# Patient Record
Sex: FEMALE | Race: WHITE | NOT HISPANIC OR LATINO | Employment: PART TIME | URBAN - NONMETROPOLITAN AREA
[De-identification: names, ages, dates, MRNs, and addresses within clinical notes are randomized per-mention and may not be internally consistent; named-entity substitution may affect disease eponyms.]

---

## 2017-07-18 ENCOUNTER — COMMUNICATION - GICH (OUTPATIENT)
Dept: FAMILY MEDICINE | Facility: OTHER | Age: 32
End: 2017-07-18

## 2017-12-10 ENCOUNTER — AMBULATORY - GICH (OUTPATIENT)
Dept: FAMILY MEDICINE | Facility: OTHER | Age: 32
End: 2017-12-10

## 2017-12-10 DIAGNOSIS — J02.0 STREPTOCOCCAL PHARYNGITIS: ICD-10-CM

## 2018-02-19 ENCOUNTER — DOCUMENTATION ONLY (OUTPATIENT)
Dept: FAMILY MEDICINE | Facility: OTHER | Age: 33
End: 2018-02-19

## 2018-08-20 ENCOUNTER — NURSE TRIAGE (OUTPATIENT)
Dept: FAMILY MEDICINE | Facility: OTHER | Age: 33
End: 2018-08-20

## 2018-08-20 NOTE — TELEPHONE ENCOUNTER
" Patient calls and states the following:  I am traveling up the [facility] and think I might have a UTI. Reports frequency and burning with urination. Informed patient that PCP is not in clinic and recommended she go to a clinic near where she is traveling for an evaluation of urinary symptoms, \" I will. Thank you so much\". Maday Mackenzie RN on 8/20/2018 at 12:22 PM      Answer Assessment - Initial Assessment Questions  1. SYMPTOM: \"What's the main symptom you're concerned about?\" (e.g., frequency, incontinence)      *No Answer*  2. ONSET: \"When did the  ________  start?\"      *No Answer*  3. PAIN: \"Is there any pain?\" If so, ask: \"How bad is it?\" (Scale: 1-10; mild, moderate, severe)      *No Answer*  4. CAUSE: \"What do you think is causing the symptoms?\"      *No Answer*  5. OTHER SYMPTOMS: \"Do you have any other symptoms?\" (e.g., fever, flank pain, blood in urine, pain with urination)      *No Answer*  6. PREGNANCY: \"Is there any chance you are pregnant?\" \"When was your last menstrual period?\"      *No Answer*    Protocols used: URINARY SYMPTOMS-ADULT-    "

## 2018-12-07 ENCOUNTER — TELEPHONE (OUTPATIENT)
Dept: FAMILY MEDICINE | Facility: OTHER | Age: 33
End: 2018-12-07

## 2018-12-07 ENCOUNTER — HOSPITAL ENCOUNTER (OUTPATIENT)
Dept: GENERAL RADIOLOGY | Facility: OTHER | Age: 33
Discharge: HOME | End: 2018-12-07
Attending: FAMILY MEDICINE | Admitting: FAMILY MEDICINE
Payer: COMMERCIAL

## 2018-12-07 ENCOUNTER — OFFICE VISIT (OUTPATIENT)
Dept: FAMILY MEDICINE | Facility: OTHER | Age: 33
End: 2018-12-07
Attending: FAMILY MEDICINE
Payer: COMMERCIAL

## 2018-12-07 VITALS
TEMPERATURE: 98.1 F | RESPIRATION RATE: 16 BRPM | HEIGHT: 66 IN | BODY MASS INDEX: 25.23 KG/M2 | DIASTOLIC BLOOD PRESSURE: 70 MMHG | SYSTOLIC BLOOD PRESSURE: 100 MMHG | WEIGHT: 157 LBS

## 2018-12-07 DIAGNOSIS — T83.32XA INTRAUTERINE CONTRACEPTIVE DEVICE THREADS LOST, INITIAL ENCOUNTER: ICD-10-CM

## 2018-12-07 DIAGNOSIS — Z00.00 ROUTINE HISTORY AND PHYSICAL EXAMINATION OF ADULT: Primary | ICD-10-CM

## 2018-12-07 PROCEDURE — 99395 PREV VISIT EST AGE 18-39: CPT | Performed by: FAMILY MEDICINE

## 2018-12-07 PROCEDURE — 74019 RADEX ABDOMEN 2 VIEWS: CPT

## 2018-12-07 ASSESSMENT — PAIN SCALES - GENERAL: PAINLEVEL: NO PAIN (0)

## 2018-12-07 NOTE — MR AVS SNAPSHOT
"              After Visit Summary   12/7/2018    Pat Mccabe    MRN: [id number]           Patient Information     Date Of Birth          1985        Visit Information        Provider Department      12/7/2018 10:30 AM Renetta Fletcher DO [facility] [place]        Today's Diagnoses     Routine history and physical examination of adult    -  1    Intrauterine contraceptive device threads lost, initial encounter           Follow-ups after your visit        Future tests that were ordered for you today     Open Future Orders        Priority Expected Expires Ordered    XR Abdomen 2 Views Routine 12/7/2018 12/7/2019 12/7/2018            Who to contact     If you have questions or need follow up information about today's clinic visit or your schedule please contact [facility] directly at [phone].  Normal or non-critical lab and imaging results will be communicated to you by [facility], letter or phone within 4 business days after the clinic has received the results. If you do not hear from us within 7 days, please contact the clinic through [facility] or phone. If you have a critical or abnormal lab result, we will notify you by phone as soon as possible.  Submit refill requests through [facility] or call your pharmacy and they will forward the refill request to us. Please allow 3 business days for your refill to be completed.          Additional Information About Your Visit        [facility] Information     [facility] lets you send messages to your doctor, view your test results, renew your prescriptions, schedule appointments and more. To sign up, go to [email or web address] . Click on \"Log in\" on the left side of the screen, which will take you to the Welcome page. Then click on \"Sign up Now\" on the right side of the page.     You will be asked to enter the access code listed below, as well as some personal information. Please follow the directions to create your username and " "password.     Your access code is: [email or web address]  Expires: 3/7/2019 10:27 AM     Your access code will  in 90 days. If you need help or a new code, please call your [facility] or [phone].        Care EveryWhere ID     This is your Care EveryWhere ID. This could be used by other organizations to access your [facility] medical records  [id number]        Your Vitals Were     Temperature Respirations Height Breastfeeding? BMI (Body Mass Index)       98.1  F (36.7  C) (Tympanic) 16 5' 6.25\" (1.683 m) No 25.15 kg/m2        Blood Pressure from Last 3 Encounters:   18 100/70   16 102/78   12/15/15 108/62    Weight from Last 3 Encounters:   18 157 lb (71.2 kg)   16 169 lb 9.6 oz (76.9 kg)   12/15/15 167 lb 9.6 oz (76 kg)               Primary Care Provider Office Phone # Fax #    [facility] [phone] [address]  [facility] [id number]        Equal Access to Services     ANNMARIE STOREY AH: Hadii nasreen virgeno Soroge, waaxda luqadaha, qaybta kaalmada adeegyada, glen dooley. So [facility] [phone].    ATENCIÓN: Si habla español, tiene a simmons disposición servicios gratuitos de asistencia lingüística. [facility] [phone].    We comply with applicable federal civil rights laws and [place] laws. We do not discriminate on the basis of race, color, national origin, age, disability, sex, sexual orientation, or gender identity.            Thank you!     Thank you for choosing [facility]  for your care. Our goal is always to provide you with excellent care. Hearing back from our patients is one way we can continue to improve our services. Please take a few minutes to complete the written survey that you may receive in the mail after your visit with us. Thank you!             Your Updated Medication List - Protect others around you: Learn how to safely use, store and throw away your medicines at " www.disposemymeds.org.      Notice  As of 12/7/2018 11:59 PM    You have not been prescribed any medications.

## 2018-12-07 NOTE — PROGRESS NOTES
"Nursing Notes:   Patience Medina LPN  12/7/2018 10:46 AM  Signed  Chief Complaint   Patient presents with     Physical     yearly physical, pap, and IUD       Initial /70 (BP Location: Right arm, Patient Position: Sitting, Cuff Size: Adult Regular)  Temp 98.1  F (36.7  C) (Tympanic)  Resp 16  Ht 5' 6.25\" (1.683 m)  Wt 157 lb (71.2 kg)  Breastfeeding? No  BMI 25.15 kg/m2 Estimated body mass index is 25.15 kg/(m^2) as calculated from the following:    Height as of this encounter: 5' 6.25\" (1.683 m).    Weight as of this encounter: 157 lb (71.2 kg).  Medication Reconciliation: complete    Patience Medina LPN     ANNUAL PHYSICAL - FEMALE    HPI: Patient who presents for a yearly exam.  Concerns include:  1. IUD check.  No pain.  No periods.    No LMP recorded. Patient is not currently having periods (Reason: IUD).   Contraception: Mirena IUD  Risk for STI?: No  Last pap: 1/22/2016 - negative with Neg HPV.  Due 1/22/2021  Any hx of abnormal paps:  No  FH ofearly CA?: No  Cholesterol/DM concerns/screening: No  Tobacco?: No  Calcium intake: No  DEXA: NA  Last mammo: NA  Colonoscopy: NA  Immunizations: Tdap 8/5/2015    Patient Active Problem List   Diagnosis     Spontaneous vaginal delivery       Past Medical History:   Diagnosis Date     Syncope and collapse     2011,with abd pain, (-) workup       Past Surgical History:   Procedure Laterality Date     COLONOSCOPY      2011     LAPAROSCOPIC CHOLECYSTECTOMY      1/2/14,Cholecystectomy,Laparoscopic       Social History     Social History     Marital status:      Spouse name: N/A     Number of children: N/A     Years of education: N/A     Social History Main Topics     Smoking status: Former Smoker     Packs/day: 0.20     Types: Cigarettes     Quit date: 11/12/2013     Smokeless tobacco: Never Used     Alcohol use No     Drug use: None      Comment: Drug use: No     Sexual activity: Yes     Partners: Male     Other Topics Concern     None     Social History Narrative " "   Patient lives in [place].     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    Boyfriend, Stephan, works at [employer].    .       History reviewed. No pertinent family history.    No current outpatient prescriptions on file.        REVIEW OF SYSTEMS:  Refer to HPI; all other systems reviewed and negative.    PHYSICAL EXAM:  /70 (BP Location: Right arm, Patient Position: Sitting, Cuff Size: Adult Regular)  Temp 98.1  F (36.7  C) (Tympanic)  Resp 16  Ht 5' 6.25\" (1.683 m)  Wt 157 lb (71.2 kg)  Breastfeeding? No  BMI 25.15 kg/m2  CONSTITUTIONAL:  Alert, cooperative, NAD.  EYES: No scleral icterus.  PERRLA.  Conjunctiva clear.  ENT/MOUTH: External ears and nose normal.  TMs normal.  Moist mucous membranes. Oropharynx clear.    ENDO: No thyromegaly or thyroid nodules.  LYMPH:  No cervical or supraclavicular LA.    BREASTS: No skin abnormalities, no erythema.  No discrete masses.  No nipple discharge, no axillary, supra- or infraclavicular LA.   CARDIOVASCULAR: Regular, S1, S2.  No S3 or S4.  No murmur/gallop/rub.  No peripheral edema.  RESPIRATORY: CTA bilaterally, no wheezes, rhonchi or rales.  GI: Bowel sounds wnl.  Soft, nontender, non-distended.  No masses or HSM.  No rebound or guarding.  : Vulva: normal, no lesions or discharge  Urethral meatus: normal size and location, no lesions or discharge  Urethra: no tenderness or masses  Bladder: no fullness or tenderness  Vagina: normal appearance, no abnormal discharge, no lesions.  No evidence of cystocele or rectocele.  Cervix: normal appearance, no lesions, no abnormal discharge.  No IUD strings visualized.  Uterus: normal size and position, mobile, non-tender  Adnexa: nopalpable masses bilaterally. No cervical motion tenderness.  Pap smear obtained: No  MSKEL: Grossly normal ROM.  No clubbing.  INTEGUMENTARY:Warm, dry.  No rash noted on exposed skin.  NEUROLOGIC: Facies symmetric.  Grossly normal movement and tone.  No " tremor.  PSYCHIATRIC: Affect normal.  Speech fluent.      PHQ-2 Score:   PHQ-2 ( 1999 Pf[employer]) 12/7/2018   Q1: Little interest or pleasure in doing things 0   Q2: Feeling down, depressed or hopeless 0   PHQ-2 Score 0      Abd XR:  IUD in place, just L of center and sligthly tilted.  Consider US if concerns.    ASSESSMENT/PLAN:  1. Routine history and physical examination of adult    2. Intrauterine contraceptive device threads lost, initial encounter  IUD strings lost; but IUD remains in place and asymptomatic.  Leave in place, and only evaluate further at time of removal vs if becoming symptomatic.  - XR Abdomen 2 Views; Future    Follow up yearly; sooner prn.    Relevant cancer screening discussed.    Counseled on healthy diet, Calcium and vitamin D intake, and exercise.    Renetta Fletcher

## 2018-12-07 NOTE — NURSING NOTE
"Chief Complaint   Patient presents with     Physical     yearly physical, pap, and IUD       Initial /70 (BP Location: Right arm, Patient Position: Sitting, Cuff Size: Adult Regular)  Temp 98.1  F (36.7  C) (Tympanic)  Resp 16  Ht 5' 6.25\" (1.683 m)  Wt 157 lb (71.2 kg)  Breastfeeding? No  BMI 25.15 kg/m2 Estimated body mass index is 25.15 kg/(m^2) as calculated from the following:    Height as of this encounter: 5' 6.25\" (1.683 m).    Weight as of this encounter: 157 lb (71.2 kg).  Medication Reconciliation: complete    Patience Medina LPN   "

## 2019-01-09 ENCOUNTER — MYC MEDICAL ADVICE (OUTPATIENT)
Dept: FAMILY MEDICINE | Facility: OTHER | Age: 34
End: 2019-01-09

## 2019-03-26 ENCOUNTER — MYC MEDICAL ADVICE (OUTPATIENT)
Dept: FAMILY MEDICINE | Facility: OTHER | Age: 34
End: 2019-03-26

## 2019-03-26 DIAGNOSIS — J35.8 TONSIL STONE: ICD-10-CM

## 2019-03-26 DIAGNOSIS — D22.9 MULTIPLE ATYPICAL SKIN MOLES: Primary | ICD-10-CM

## 2019-04-19 ENCOUNTER — HEALTH MAINTENANCE LETTER (OUTPATIENT)
Age: 34
End: 2019-04-19

## 2019-07-02 ENCOUNTER — MYC MEDICAL ADVICE (OUTPATIENT)
Dept: FAMILY MEDICINE | Facility: OTHER | Age: 34
End: 2019-07-02

## 2019-09-16 ENCOUNTER — OFFICE VISIT (OUTPATIENT)
Dept: FAMILY MEDICINE | Facility: OTHER | Age: 34
End: 2019-09-16
Attending: PHYSICIAN ASSISTANT
Payer: COMMERCIAL

## 2019-09-16 VITALS
BODY MASS INDEX: 25.55 KG/M2 | HEART RATE: 72 BPM | SYSTOLIC BLOOD PRESSURE: 100 MMHG | WEIGHT: 159 LBS | TEMPERATURE: 97.7 F | HEIGHT: 66 IN | OXYGEN SATURATION: 97 % | DIASTOLIC BLOOD PRESSURE: 78 MMHG | RESPIRATION RATE: 18 BRPM

## 2019-09-16 DIAGNOSIS — J35.8 TONSIL STONE: ICD-10-CM

## 2019-09-16 DIAGNOSIS — Z01.818 PREOP GENERAL PHYSICAL EXAM: Primary | ICD-10-CM

## 2019-09-16 LAB
HCG UR QL: NEGATIVE
HGB BLD-MCNC: 14.5 G/DL (ref 11.7–15.7)

## 2019-09-16 PROCEDURE — 85018 HEMOGLOBIN: CPT | Mod: ZL | Performed by: PHYSICIAN ASSISTANT

## 2019-09-16 PROCEDURE — 81025 URINE PREGNANCY TEST: CPT | Mod: ZL | Performed by: PHYSICIAN ASSISTANT

## 2019-09-16 PROCEDURE — 99214 OFFICE O/P EST MOD 30 MIN: CPT | Performed by: PHYSICIAN ASSISTANT

## 2019-09-16 PROCEDURE — 36415 COLL VENOUS BLD VENIPUNCTURE: CPT | Mod: ZL | Performed by: PHYSICIAN ASSISTANT

## 2019-09-16 ASSESSMENT — MIFFLIN-ST. JEOR: SCORE: 1442.97

## 2019-09-16 NOTE — NURSING NOTE
"Date of Surgery: 9/26/19  Type of Surgery: Tonsillectomy  Surgeon: Dr. Cortez  Hospital:  [facility]  Fax:     Fever/Chills or other infectious symptoms in past month: NO  >10lb weight loss in past two months: No  O2 SAT: 97    Health Care Directive/Code status:  FULL  Hx of blood transfusions:   NO   Td up to date:  2015  History of VRE/MRSA:  NO Date:    Preoperative Evaluation: Obstructive Sleep Apnea screening    S: Snore -  Do you snore loudly? (louder than talking or loud enough to be heard through closed doors)NO  T: Tired - Do you often feel tired, fatigued, or sleepy during the daytime?NO  O: Observed - Has anyone ever observed you stop breathing during your sleep?No  P: Pressure - Do you have or are you being treated for high blood pressure?NO  B: BMI - BMI greater than 35kg/m2?NO  A: Age - Age over 50 years old?NO  N: Neck - Neck circumference greater than 40 cm?NO  G: Gender - Gender: Male?NO    Total number of \"YES\" responses:  0    Scoring: Low risk of DANNY 0-2  At Risk of DANNY: >3 High Risk of DANNY: 5-8    Milady Horta LPN ...... 9/16/2019 10:42 AM    Chief Complaint   Patient presents with     Pre-Op Exam         Medication Reconciliation: complete    Verónica Horta LPN        "

## 2019-09-16 NOTE — PROGRESS NOTES
----------------- PREOPERATIVE EXAM ------------------  9/16/2019    SUBJECTIVE:  Pat Mccabe is a 33 year old female here for preoperative optimization.    I was asked to see Pat Mccabe by Dr. Cortez for a preoperative optimization due to general health and anesthesia risk.     Patient has a history of recurrent tonsil stones. Has had them her whole life. Also notes she gets a bad taste in her mouth and sore throats often. Today she is concerned about using pain medication after surgery. Has been on pain medication three times and got very nauseous each time. One time she started throwing up after she got home. Wants to try and avoid this with her upcoming surgery. No cough/cold symptoms.     Patient has otherwise tolerated surgery fine in the past.  Former smoker but not currently.  No fevers, chills, sore throat, ear pain, chest pain, palpitations, problems breathing, stomachaches, nausea, vomiting, diarrhea, constipation, blood in stool or urine, dysuria, frequency, urgency.  No swelling of her hands or feet.    Nursing Notes:   Verónica Horta LPN  9/16/2019 10:52 AM  Signed  Date of Surgery: 9/26/19  Type of Surgery: Tonsillectomy  Surgeon: Dr. Cortez  Hospital:  [facility]  Fax:     Fever/Chills or other infectious symptoms in past month: NO  >10lb weight loss in past two months: No  O2 SAT: 97    Health Care Directive/Code status:  FULL  Hx of blood transfusions:   NO   Td up to date:  2015  History of VRE/MRSA:  NO Date:    Preoperative Evaluation: Obstructive Sleep Apnea screening    S: Snore -  Do you snore loudly? (louder than talking or loud enough to be heard through closed doors)NO  T: Tired - Do you often feel tired, fatigued, or sleepy during the daytime?NO  O: Observed - Has anyone ever observed you stop breathing during your sleep?No  P: Pressure - Do you have or are you being treated for high blood pressure?NO  B: BMI - BMI greater than 35kg/m2?NO  A: Age - Age  "over 50 years old?NO  N: Neck - Neck circumference greater than 40 cm?NO  G: Gender - Gender: Male?NO    Total number of \"YES\" responses:  0    Scoring: Low risk of DANNY 0-2  At Risk of DANNY: >3 High Risk of DANNY: 5-8    Milady Horta LPN ...... 2019 10:42 AM    Chief Complaint   Patient presents with     Pre-Op Exam         Medication Reconciliation: complete    Verónica Horta LPN          There are no active problems to display for this patient.      Past Medical History:   Diagnosis Date     Syncope and collapse     ,with abd pain, (-) workup       Past Surgical History:   Procedure Laterality Date     COLONOSCOPY           LAPAROSCOPIC CHOLECYSTECTOMY      14,Cholecystectomy,Laparoscopic       No current outpatient medications on file.       Recent use of ibuprofen, aspirin or aleve: No     Allergies:  Allergies   Allergen Reactions     Amoxicillin-Pot Clavulanate GI Disturbance     Stomach cramps and diarrhea       Latex allergy  No    Family History   Problem Relation Age of Onset     No Known Problems Mother      No Known Problems Father      No Known Problems Sister      No Known Problems Brother        Denies family hx of bleeding tendencies, anesthesia complications, or other problems with surgery.  None    Personal history of nausea with pain medications in the past.     Social History     Tobacco Use     Smoking status: Former Smoker     Packs/day: 0.20     Types: Cigarettes     Last attempt to quit: 2013     Years since quittin.8     Smokeless tobacco: Never Used     Tobacco comment: Quit 3 years    Substance Use Topics     Alcohol use: Yes     Alcohol/week: 0.0 oz     Comment: occasional, 3 per week          ROS:    surgical:  patient denies previous complications from prior surgeries including but not limited to prolonged bleeding, anesthesia complications, dysrhythmias, surgical wound infections, or prolonged hospital stay.      REVIEW OF SYSTEMS:  A comprehensive " "review of systems was negative except for items noted in HPI/Subjective.    Constitutional: Negative for fever, chills and fatigue .   Eyes: Negative for irritation  and redness .  Ears, nose, mouth, throat, and face: Negative for ear drainage, nasal congestion, sore throat and hoarseness .  See HPI.  Respiratory: Negative for cough, sputum production , hemoptysis, dyspnea  and wheezing .  Cardiovascular: Negative for chest discomfort, palpitations and lightheadedness .  Gastrointestinal: Negative for dysphagia, nausea, vomiting, melena, diarrhea, constipation and abdominal pain.  Genitourinary: Negative for frequency, dysuria, urinary incontinence, hematuria.  Integument/breast: Negative for rash.   Hematologic/lymphatic: Negative for easy bruising, bleeding, lymphadenopathy and petechiae.   Musculoskeletal: Negative for myalgias and arthralgias .  Neurological: Negative for headaches, dizziness, vertigo, seizures and weakness.   Psychiatric: Negative for anxiety, depression, panic attacks and suicidal ideations.       -------------------------------------------------------------    PHYSICAL EXAM:  /78 (BP Location: Right arm, Patient Position: Sitting, Cuff Size: Adult Regular)   Pulse 72   Temp 97.7  F (36.5  C)   Resp 18   Ht 1.676 m (5' 6\")   Wt 72.1 kg (159 lb)   SpO2 97%   BMI 25.66 kg/m      EXAM:  General Appearance: Pleasant, alert, appropriate appearance for age. No acute distress  Head Exam: Normal. Normocephalic, atraumatic.  Eye Exam: Normal external eye, conjunctiva, lids, cornea. JUANY.  Ear Exam: Normal TM's bilaterally. Normal auditory canals and external ears. Non-tender.  Nose Exam: Normal external nose.  OroPharynx Exam: Dental hygiene adequate. Normal buccal mucosa.  Minimally erythematous posterior pharynx.  Neck Exam: Supple, no masses or nodes., no lymphadenopathy  Chest/Respiratory Exam: Normal chest wall and respirations. Clear to auscultation.  Cardiovascular Exam: Regular " rate and rhythm. S1, S2, no murmur, click, gallop, or rubs.  Gastrointestinal Exam: Soft, nontender, no abnormal masses or organomegaly. BS x 4.  Lymphatic Exam: Normal.  Musculoskeletal Exam: Back is straight, full ROM of upper and lower extremities.  Skin: no rash or abnormalities  Neurologic Exam: normal gross motor movement, tone, and coordination. No tremor.  Psychiatric Exam: Alert and oriented, appropriate affect.    PHQ Depression Screen  No flowsheet data found.    Patient can walk up a flight of stairs without becoming short of breath or having chest pain: YES   Patient is able to tolerate greater than 4 METs of activity without any cardiopulmonary symptoms.    LABS:    Results for orders placed or performed in visit on 09/16/19   Pregnancy, Urine (HCG)   Result Value Ref Range    HCG Qual Urine Negative NEG^Negative   Hemoglobin   Result Value Ref Range    Hemoglobin 14.5 11.7 - 15.7 g/dL          CXR:  Not needed     EKG: Not needed     ---------------------------------------------------------------    No family history of problems with bleeding or anesthetia. Patient's perioperative risk is minimized and no further cardiopulmonary workup is neccesary.  Please contact the office with any questions or concerns.      ICD-10-CM    1. Preop general physical exam Z01.818 Hemoglobin     Pregnancy, Urine (HCG)     Hemoglobin   2. Tonsil stone J35.8 Hemoglobin     Pregnancy, Urine (HCG)     Hemoglobin         ASSESSEMNT AND PLAN:  1.  Preoperative history and physical   consults:  None  Patient is approved for the surgery.  No concerns.     For above listed surgery and anesthesia:    - Patient is Low risk for perioperative complications.    Patient was administered the following vaccines today: None.  Completed labs today: hemoglobin and UPT, no concerns.    PRE OP RECOMMENDATIONS:  Patient is on chronic pain medications no   Patient is on antiplatlet/anticoagulation no   Other medications that need adjustment  perioperatively no      Patient Instructions   Patient should take the following medications the morning of surgery with a small sip of water: hold all meds.  Patient was instructed to hold the following medications the morning of surgery: hold all meds.     Patient was advised to call our office and the surgical services with any change in condition or new symptoms if they were to develop between today and their surgical date.  Especially any cardiopulmonary symptoms or symptoms concerning for an infection.     Discontinue aspirin, aleve, naproxen and ibuprofen 7 days prior to surgery to reduce bleeding risk.  It is fine to take tylenol the week before surgery.  Hold vitamins and herbal remedies for 7 days before surgery.       Other:  Patient was advised to call our office and the surgical services with any change in condition or new symptoms if they were to develop between today and their surgical date.  Especially any cardiopulmonary symptoms or symptoms concerning for an infection.     PRE OP RECOMMENDATIONS:  Discontinue ASA 7 days prior to reduce bleeding risk and Discontinue NSAIDS 7 days prior to procedure to reduce bleeding risk    Greater than 25 minutes were spent in counseling and coordination of care.    Angeles Morales PA-C PA-C..................9/16/2019 8:17 AM

## 2019-09-28 ENCOUNTER — HEALTH MAINTENANCE LETTER (OUTPATIENT)
Age: 34
End: 2019-09-28

## 2020-01-14 ENCOUNTER — MYC MEDICAL ADVICE (OUTPATIENT)
Dept: FAMILY MEDICINE | Facility: OTHER | Age: 35
End: 2020-01-14

## 2020-01-14 DIAGNOSIS — Z30.432 ENCOUNTER FOR IUD REMOVAL: Primary | ICD-10-CM

## 2020-01-20 ENCOUNTER — MYC MEDICAL ADVICE (OUTPATIENT)
Dept: FAMILY MEDICINE | Facility: OTHER | Age: 35
End: 2020-01-20

## 2020-01-20 ENCOUNTER — MYC MEDICAL ADVICE (OUTPATIENT)
Dept: OBGYN | Facility: OTHER | Age: 35
End: 2020-01-20

## 2020-02-05 ENCOUNTER — MYC MEDICAL ADVICE (OUTPATIENT)
Dept: FAMILY MEDICINE | Facility: OTHER | Age: 35
End: 2020-02-05

## 2020-03-15 ENCOUNTER — HEALTH MAINTENANCE LETTER (OUTPATIENT)
Age: 35
End: 2020-03-15

## 2020-08-20 ENCOUNTER — MYC MEDICAL ADVICE (OUTPATIENT)
Dept: FAMILY MEDICINE | Facility: OTHER | Age: 35
End: 2020-08-20

## 2020-08-20 DIAGNOSIS — T83.32XS INTRAUTERINE CONTRACEPTIVE DEVICE THREADS LOST, SEQUELA: Primary | ICD-10-CM

## 2020-08-20 NOTE — TELEPHONE ENCOUNTER
Referral placed to OB/GYN for IUD removal due to lost IUD strings.  Renetta Fletcher,  on 8/20/2020 at 4:42 PM

## 2020-09-25 ENCOUNTER — OFFICE VISIT (OUTPATIENT)
Dept: OBGYN | Facility: OTHER | Age: 35
End: 2020-09-25
Attending: FAMILY MEDICINE
Payer: COMMERCIAL

## 2020-09-25 VITALS — DIASTOLIC BLOOD PRESSURE: 86 MMHG | SYSTOLIC BLOOD PRESSURE: 116 MMHG | HEART RATE: 20 BPM

## 2020-09-25 DIAGNOSIS — T83.32XS INTRAUTERINE CONTRACEPTIVE DEVICE THREADS LOST, SEQUELA: Primary | ICD-10-CM

## 2020-09-25 DIAGNOSIS — Z30.432 ENCOUNTER FOR IUD REMOVAL: ICD-10-CM

## 2020-09-25 DIAGNOSIS — Z30.011 ENCOUNTER FOR INITIAL PRESCRIPTION OF CONTRACEPTIVE PILLS: ICD-10-CM

## 2020-09-25 PROCEDURE — 58301 REMOVE INTRAUTERINE DEVICE: CPT | Performed by: OBSTETRICS & GYNECOLOGY

## 2020-09-25 RX ORDER — DROSPIRENONE AND ETHINYL ESTRADIOL 0.03MG-3MG
1 KIT ORAL DAILY
Qty: 84 TABLET | Refills: 4 | Status: SHIPPED | OUTPATIENT
Start: 2020-09-25 | End: 2021-02-24

## 2020-09-25 ASSESSMENT — PAIN SCALES - GENERAL: PAINLEVEL: NO PAIN (0)

## 2020-09-25 NOTE — PROGRESS NOTES
IUD Removal Procedure Visit    SUBJECTIVE: Pat Mccabe is a 34 year old female, requests IUD removal. Will be 5 years in January. Knows her IUD strings have not been visualized for many years, previously had an xray that demonstrated an IUD in the pelvis. Still hasn't decided if she is done childbearing, wants to do OCPs for now    Verification of Procedure:  Just before the procedure begans through verbal and active participation of team members, I verified:     Initials   Patient Name Pat Mccabe    Patient  1985    Procedure to be performed IUD removal     Consent:  Risks, benefits of treatment, and alternative options for contraception were discussed.  Patient's questions were elicited and answered.  Written consent was obtained and scanned into medical record.       OBJECTIVE: /86 (BP Location: Right arm, Patient Position: Sitting, Cuff Size: Adult Regular)   Pulse (!) 20   Breastfeeding No       PROCEDURE NOTE  --  Mirena Removal     A pre-procedure bedside ultrasound was performed, which demonstrated an intrauterine location for the IUD  Under sterile technique, cervix was visualized with a medium Graves speculum and prepped with Betadine solution. Attempts to sweep the strings with a cytobrush were unsuccessful. A packing forceps was then used and the strings were grasped in the upper endocervical canal. The IUD was then easily removed with gentle traction.      EBL:  Minimal     Complications:  None      Pat Mccabe tolerated procedure well.    PLAN:      OCPs sent. Encouraged her to start prenatal vitamins if/when she decides to try for pregnancy. Briefly reviewed risks of an AMA pregnancy.   RTC prjaspreet Shen MD  OB/GYN  2020 9:54 AM

## 2020-09-25 NOTE — NURSING NOTE
"Chief Complaint   Patient presents with     IUD     removal       Initial /86 (BP Location: Right arm, Patient Position: Sitting, Cuff Size: Adult Regular)   Pulse (!) 20   Breastfeeding No  Estimated body mass index is 25.66 kg/m  as calculated from the following:    Height as of 9/16/19: 1.676 m (5' 6\").    Weight as of 9/16/19: 72.1 kg (159 lb).  Medication Reconciliation: Completed     Boom Cruz LPN  "

## 2020-10-26 ENCOUNTER — VIRTUAL VISIT (OUTPATIENT)
Dept: FAMILY MEDICINE | Facility: OTHER | Age: 35
End: 2020-10-26
Attending: FAMILY MEDICINE
Payer: COMMERCIAL

## 2020-10-26 VITALS — HEIGHT: 66 IN | TEMPERATURE: 97.5 F | WEIGHT: 180 LBS | BODY MASS INDEX: 28.93 KG/M2

## 2020-10-26 DIAGNOSIS — Z13.9 SCREENING FOR CONDITION: Primary | ICD-10-CM

## 2020-10-26 PROCEDURE — 99207 PR NO CHARGE LOS: CPT | Mod: 95 | Performed by: FAMILY MEDICINE

## 2020-10-26 ASSESSMENT — MIFFLIN-ST. JEOR: SCORE: 1533.22

## 2020-10-26 ASSESSMENT — PAIN SCALES - GENERAL: PAINLEVEL: NO PAIN (0)

## 2020-10-26 NOTE — NURSING NOTE
"Chief Complaint   Patient presents with     COVID exposure     Patients  did a rideshare with someone who tested positive for COVID last Wednesday.    Initial Temp 97.5  F (36.4  C) (Temporal)   Ht 1.676 m (5' 6\")   Wt 81.6 kg (180 lb)   BMI 29.05 kg/m   Estimated body mass index is 29.05 kg/m  as calculated from the following:    Height as of this encounter: 1.676 m (5' 6\").    Weight as of this encounter: 81.6 kg (180 lb).  Medication Reconciliation: complete    Jossie York LPN  "

## 2020-10-26 NOTE — PROGRESS NOTES
"Pat Mccabe is a 34 year old female who is being evaluated via a billable telephone visit.      The patient has been notified of following:     \"This telephone visit will be conducted via a call between you and your physician/provider. We have found that certain health care needs can be provided without the need for a physical exam.  This service lets us provide the care you need with a short phone conversation.  If a prescription is necessary we can send it directly to your pharmacy.  If lab work is needed we can place an order for that and you can then stop by our lab to have the test done at a later time.    Telephone visits are billed at different rates depending on your insurance coverage. During this emergency period, for some insurers they may be billed the same as an in-person visit.  Please reach out to your insurance provider with any questions.    If during the course of the call the physician/provider feels a telephone visit is not appropriate, you will not be charged for this service.\"    Patient has given verbal consent for Telephone visit?  Yes    What phone number would you like to be contacted at? [phone]    How would you like to obtain your AVS? [facility]  Nursing Notes:   Jossie York LPN  10/26/2020  5:38 PM  Signed  Chief Complaint   Patient presents with     COVID exposure     Patients  did a rideshare with someone who tested positive for COVID last Wednesday.    Initial Temp 97.5  F (36.4  C) (Temporal)   Ht 1.676 m (5' 6\")   Wt 81.6 kg (180 lb)   BMI 29.05 kg/m   Estimated body mass index is 29.05 kg/m  as calculated from the following:    Height as of this encounter: 1.676 m (5' 6\").    Weight as of this encounter: 81.6 kg (180 lb).  Medication Reconciliation: complete    Jossie York LPN    Subjective     Pat Mccabe is a 34 year old female who presents via phone visit today for the following health issues:      Had no direct exposure to a COVID patient. " Her spouse rides with someone to work and that person tested +. Her spouse elects not to be tested. She has no symptoms and they are trying to be careful in the home. Has 2 children. She has continued to work.          HPI         Review of Systems   Constitutional, HEENT, cardiovascular, pulmonary, gi and gu systems are negative, except as otherwise noted.       Objective   Vitals - Patient Reported  Pain Score: No Pain (0)      Vitals:  No vitals were obtained today due to virtual visit.    No test indicated        Assessment/Plan:  1. Screening for condition        Patient does not meet criteria for testing.  Monitor for symptoms.   Quarantine in home as much as possible with family.    Phone call duration:  4 minutes    Francheska Naik MD  5:49 PM 10/26/2020

## 2021-01-10 ENCOUNTER — HEALTH MAINTENANCE LETTER (OUTPATIENT)
Age: 36
End: 2021-01-10

## 2021-02-19 ENCOUNTER — MYC MEDICAL ADVICE (OUTPATIENT)
Dept: FAMILY MEDICINE | Facility: OTHER | Age: 36
End: 2021-02-19

## 2021-02-19 ENCOUNTER — MYC MEDICAL ADVICE (OUTPATIENT)
Dept: OBGYN | Facility: OTHER | Age: 36
End: 2021-02-19

## 2021-02-24 ENCOUNTER — VIRTUAL VISIT (OUTPATIENT)
Dept: OBGYN | Facility: OTHER | Age: 36
End: 2021-02-24
Attending: FAMILY MEDICINE
Payer: COMMERCIAL

## 2021-02-24 VITALS — WEIGHT: 178 LBS | HEIGHT: 66 IN | BODY MASS INDEX: 28.61 KG/M2

## 2021-02-24 DIAGNOSIS — Z32.01 PREGNANCY EXAMINATION OR TEST, POSITIVE RESULT: Primary | ICD-10-CM

## 2021-02-24 PROCEDURE — 99207 PR OB VISIT-NO CHARGE - GICH ONLY: CPT | Mod: TEL

## 2021-02-24 ASSESSMENT — PATIENT HEALTH QUESTIONNAIRE - PHQ9: SUM OF ALL RESPONSES TO PHQ QUESTIONS 1-9: 1

## 2021-02-24 ASSESSMENT — MIFFLIN-ST. JEOR: SCORE: 1519.15

## 2021-02-24 NOTE — PROGRESS NOTES
Patient gave verbal consent for nurse only telephone visit which lasted 20 minutes.  Radha Shen RN on 2021 at 9:06 AM      HPI:    This is a 35 year old female patient,  who was called today for OB Intake visit. Patient reports positive pregnancy test at home.     Obstetrical history and OB Demographics updated to the best of this nurse's ability based on patient report. PHQ-9 depression screening and routine Domestic Abuse screening completed. All immediate questions and concerns answered.    Last menstrual period is reported as Patient's last menstrual period was 2021 (exact date). KRISTINA based on LMP is 10/27/2021.  Her cycles are regular.  Her last menstrual period was normal.   Since her LMP, she has experienced  Feeling good.       Personal OB history includes: natural births 2, G  4 and P  2  Previous OB Provider: Juan  Previous Delivering Clinic: [facility]  Release of Records: NA    Current delivery plan: [facility]  Preferred OB Provider: Jesi or Chance  Current Primary Care Provider: Chance  Pediatrician: Chance    Additional History: none    Have you travelled during the pregnancy?No  Have your sexual partner(s) travelled during the pregnancy?No      HISTORY:   Planned Pregnancy: No  Marital Status:   Occupation: Dental Hygentist  Living in Household: Spouse and Children    Father of the baby is involved.   Family and father of baby are supportive of current pregnancy.  Past Medical History of Father of Baby:No significant medical history and Seizures and has been medication for this since his 20's.    Past History:  Her past medical history is non-contributory.      She has a history of  pre-term delivery at 36 weeks    Since her last LMP she denies use of alcohol, tobacco and street drugs.    Pap smear history: YES - updated in Problem List and Health Maintenance accordingly    STD/STI history: No STD history    STD/STI symptoms: no noticeable symptoms     Past medical,  surgical, social and family history were reviewed and updated in EPIC.    Medications reviewed by this nurse. Current medication list:  Current Outpatient Medications   Medication Sig Dispense Refill     Prenatal Vit-Fe Fumarate-FA (PRENATAL MULTIVITAMIN  PLUS IRON) 27-1 MG TABS Take 1 tablet by mouth daily       The following medications were recommended to be discontinued due to Pregnancy Category D status: NA  Patient informed to contact her primary care provider as soon as possible to discuss a safer alternative.    Risk factors:  Moderate and moderately severe risks (consult with OB/Gyn)  Previous fetal or  demise: Yes  History of  delivery: Yes  History of heart disease Class I: No  Severe anemia, unresponsive to iron therapy: No  Pelvic mass or neoplasm: No  Previous : No  Hyper/hypothyroidism: No  History of postpartum hemorrhage requiring transfusion:No  History of Placenta Accreta: No    High Risk (Pregnancy managed by OB/Gyn)  Multiple pregnancy: No  Pre-gestational diabetes: No  Chronic Hypertension: No  Renal Failure: No  Heart disease, class II or greater: No  Rh Isoimmunization: No  Chronic active hepatitis: No  Convulsive disorder, poorly controlled: No  Isoimmune thrombocytopenia: No  Pre-term premature rupture of membranes: No  Lupus or other autoimmune disorder: No  Human Immunodeficiency Virus: No      ASSESSMENT/PLAN:     No diagnosis found.    35 year old , 5w0d of pregnancy with KRISTINA of 10/27/2021, Alternate KRISTINA Entry    Per standing orders and scope of practice of this nurse, patient will have the following orders placed and completed prior to initial OB visit with the appropriate provider:    --early ultrasound for dating and viability ordered for 6+ weeks gestation based on LMP    --Quantitative Beta HCG and progesterone monitoring if indicated    Counseling given:     - Recommended weight gain for pregnancy: 15-25 lbs.   BMI < 18.5  28-40 lbs   18.5 -  24.9 25-35   25 - 29.9 15-25   > 30  < 15       PLAN/PATIENT INSTRUCTIONS:    Normal exercise.  Normal sexual activity.  Prenatal vitamins.  Anticipated weight gain.    follow-up appointment with Dr. Dennison for pre-manjeet care and take multivitamin or pre- vitamins    Radha Shen RN.................................................. 2021 8:42 AM

## 2021-03-12 ENCOUNTER — HOSPITAL ENCOUNTER (OUTPATIENT)
Dept: ULTRASOUND IMAGING | Facility: OTHER | Age: 36
End: 2021-03-12
Attending: STUDENT IN AN ORGANIZED HEALTH CARE EDUCATION/TRAINING PROGRAM
Payer: COMMERCIAL

## 2021-03-12 ENCOUNTER — PRENATAL OFFICE VISIT (OUTPATIENT)
Dept: OBGYN | Facility: OTHER | Age: 36
End: 2021-03-12
Attending: STUDENT IN AN ORGANIZED HEALTH CARE EDUCATION/TRAINING PROGRAM
Payer: COMMERCIAL

## 2021-03-12 VITALS
SYSTOLIC BLOOD PRESSURE: 106 MMHG | HEART RATE: 56 BPM | WEIGHT: 179 LBS | HEIGHT: 66 IN | DIASTOLIC BLOOD PRESSURE: 60 MMHG | BODY MASS INDEX: 28.77 KG/M2

## 2021-03-12 DIAGNOSIS — Z32.01 PREGNANCY EXAMINATION OR TEST, POSITIVE RESULT: ICD-10-CM

## 2021-03-12 DIAGNOSIS — O09.521 ENCOUNTER FOR SUPERVISION OF MULTIGRAVIDA OF ADVANCED MATERNAL AGE IN FIRST TRIMESTER: Primary | ICD-10-CM

## 2021-03-12 LAB
ABO + RH BLD: NORMAL
ABO + RH BLD: NORMAL
BLD GP AB SCN SERPL QL: NORMAL
BLOOD BANK CMNT PATIENT-IMP: NORMAL
C TRACH DNA SPEC QL NAA+PROBE: NORMAL
ERYTHROCYTE [DISTWIDTH] IN BLOOD BY AUTOMATED COUNT: 12.2 % (ref 10–15)
HCT VFR BLD AUTO: 38.9 % (ref 35–47)
HGB BLD-MCNC: 13.3 G/DL (ref 11.7–15.7)
MCH RBC QN AUTO: 29.5 PG (ref 26.5–33)
MCHC RBC AUTO-ENTMCNC: 34.2 G/DL (ref 31.5–36.5)
MCV RBC AUTO: 86 FL (ref 78–100)
N GONORRHOEA DNA SPEC QL NAA+PROBE: NORMAL
PLATELET # BLD AUTO: 218 10E9/L (ref 150–450)
RBC # BLD AUTO: 4.51 10E12/L (ref 3.8–5.2)
SPECIMEN EXP DATE BLD: NORMAL
SPECIMEN SOURCE: NORMAL
WBC # BLD AUTO: 9.3 10E9/L (ref 4–11)

## 2021-03-12 PROCEDURE — 36415 COLL VENOUS BLD VENIPUNCTURE: CPT | Mod: ZL | Performed by: OBSTETRICS & GYNECOLOGY

## 2021-03-12 PROCEDURE — G0123 SCREEN CERV/VAG THIN LAYER: HCPCS | Performed by: OBSTETRICS & GYNECOLOGY

## 2021-03-12 PROCEDURE — 87624 HPV HI-RISK TYP POOLED RSLT: CPT | Mod: ZL | Performed by: OBSTETRICS & GYNECOLOGY

## 2021-03-12 PROCEDURE — 85027 COMPLETE CBC AUTOMATED: CPT | Mod: ZL | Performed by: OBSTETRICS & GYNECOLOGY

## 2021-03-12 PROCEDURE — 87491 CHLMYD TRACH DNA AMP PROBE: CPT | Mod: ZL | Performed by: OBSTETRICS & GYNECOLOGY

## 2021-03-12 PROCEDURE — 87340 HEPATITIS B SURFACE AG IA: CPT | Mod: ZL | Performed by: OBSTETRICS & GYNECOLOGY

## 2021-03-12 PROCEDURE — 88142 CYTOPATH C/V THIN LAYER: CPT | Performed by: OBSTETRICS & GYNECOLOGY

## 2021-03-12 PROCEDURE — 99207 PR OB VISIT-NO CHARGE - GICH ONLY: CPT | Performed by: OBSTETRICS & GYNECOLOGY

## 2021-03-12 PROCEDURE — 76801 OB US < 14 WKS SINGLE FETUS: CPT

## 2021-03-12 PROCEDURE — 87086 URINE CULTURE/COLONY COUNT: CPT | Mod: ZL | Performed by: OBSTETRICS & GYNECOLOGY

## 2021-03-12 PROCEDURE — 86762 RUBELLA ANTIBODY: CPT | Mod: ZL | Performed by: OBSTETRICS & GYNECOLOGY

## 2021-03-12 PROCEDURE — 86780 TREPONEMA PALLIDUM: CPT | Mod: ZL | Performed by: OBSTETRICS & GYNECOLOGY

## 2021-03-12 PROCEDURE — 87389 HIV-1 AG W/HIV-1&-2 AB AG IA: CPT | Mod: ZL | Performed by: OBSTETRICS & GYNECOLOGY

## 2021-03-12 PROCEDURE — 86900 BLOOD TYPING SEROLOGIC ABO: CPT | Mod: ZL | Performed by: OBSTETRICS & GYNECOLOGY

## 2021-03-12 PROCEDURE — 86850 RBC ANTIBODY SCREEN: CPT | Mod: ZL | Performed by: OBSTETRICS & GYNECOLOGY

## 2021-03-12 PROCEDURE — 86901 BLOOD TYPING SEROLOGIC RH(D): CPT | Mod: ZL | Performed by: OBSTETRICS & GYNECOLOGY

## 2021-03-12 ASSESSMENT — PAIN SCALES - GENERAL: PAINLEVEL: NO PAIN (0)

## 2021-03-12 ASSESSMENT — MIFFLIN-ST. JEOR: SCORE: 1523.69

## 2021-03-12 NOTE — NURSING NOTE
Chief Complaint   Patient presents with     Prenatal Care     OBPX LMP: 1/20/21     Offers no complaints Does have some N/V   Peri Santacruz LPN........................3/12/2021  9:05 AM   Medication Reconciliation: completed   Peri Santacruz LPN  3/12/2021 9:05 AM

## 2021-03-12 NOTE — PROGRESS NOTES
New Obstetrics Visit    HPI: 35 year old  at 7w2d by LMP c/w 7w2d US here today for initial OB visit. Her LMP was 21, was using a menstrual tracking lorena. This was a somewhat planned pregnancy, wasn't actively trying but also not preventing. Some cramping, no VB. Has been nauseated, no vomiting. Manageable with eating frequently.     OBHx  OB History    Para Term  AB Living   5 2 0 2 2 2   SAB TAB Ectopic Multiple Live Births   2 0 0 0 2      # Outcome Date GA Lbr Tushar/2nd Weight Sex Delivery Anes PTL Lv   5 Current            4  11/02/15 36w5d  3.595 kg (7 lb 14.8 oz) M   N RODRIGO      Apgar1: 9  Apgar5: 10   3 SAB 02/01/15        FD   2 SAB 09/15/14        FD   1  13 35w0d 10:00 / 00:03 3.374 kg (7 lb 7 oz) F    RODRIGO      Birth Comments: System Generated. Please review and update pregnancy details.      Name: Jacquie Mccabe       PMHx:   Past Medical History:   Diagnosis Date     Syncope and collapse     ,with abd pain, (-) workup      PSHx:   Past Surgical History:   Procedure Laterality Date     COLONOSCOPY           LAPAROSCOPIC CHOLECYSTECTOMY      14,Cholecystectomy,Laparoscopic      Meds:   Current Outpatient Medications   Medication     Prenatal Vit-Fe Fumarate-FA (PRENATAL MULTIVITAMIN  PLUS IRON) 27-1 MG TABS     No current facility-administered medications for this visit.      Allergies:     Allergies   Allergen Reactions     Amoxicillin-Pot Clavulanate GI Disturbance     Stomach cramps and diarrhea       SocHx:   Social History     Tobacco Use     Smoking status: Former Smoker     Packs/day: 0.20     Types: Cigarettes     Quit date: 2013     Years since quittin.3     Smokeless tobacco: Never Used     Tobacco comment: Quit 3 years    Substance Use Topics     Alcohol use: Not Currently     Alcohol/week: 0.0 standard drinks     Comment: occasional, 3 per week      Drug use: Never     Lives with her , Stephan, and her children. Works as a  "dental hygienist     FamHx:   Family History   Problem Relation Age of Onset     No Known Problems Mother      No Known Problems Father      No Known Problems Sister      No Known Problems Brother         ROS: 10-Point ROS negative except as noted in HPI      Physical Exam  /60 (BP Location: Right arm, Patient Position: Sitting, Cuff Size: Adult Large)   Pulse 56   Ht 1.676 m (5' 6\")   Wt 81.2 kg (179 lb)   LMP 2021 (Exact Date)   Breastfeeding No   BMI 28.89 kg/m    Body mass index is 28.89 kg/m .  Gen: Well-appearing, NAD  HEENT: Normocephalic, atraumatic  Neck: Thyroid is not enlarged, no appreciable masses palpated. Non-tender  CV:  RRR, no m/r/g auscultated  Pulm: CTAB, no w/r/r auscultated  Abd: Soft, non-tender, non-distended  Ext: No LE edema, extremities warm and well perfused    Breast: Symmetric, no nipple discharge or retraction, no axillary lymphadenopathy, no palpable nodules or masses bilaterally. Dense breast tissue bilaterally    Pelvic:  Normal appearing external female genitalia. No vaginal lesions. Moderate white discharge. Cervix normal, no lesions. Uterus is small, mobile, non-tender, anteverted. No adnexal tenderness or masses    Assessment/Plan:  Ms. Pat Mccabe is a 35 year old  at 7w2d by LMP c/w 7w2d US, here for new OB visit. Pregnancy is complicated by prior  birth x2, advanced maternal age.  1. Prior  birth x2: both spontaneous. Discussed increased risk of recurrent  birth, [place] to decrease this risk. She will consider and decide at next visit. Recommend patient be seen by OB/Gyn for this pregnancy due to this risk.  2. Advanced maternal age: discussed increased risks of aneuploidy, hypertension, gestational diabetes. Discussed recommendation to start ASA 81mg at 12 weeks. She will consider and decide at next visit.  3. Nausea: discussed OTC remedies, can call if it gets worse and needs an Rx  4. New OB labs ord'd  5. Genetics: " undecided, reviewed options. Will decide at next visit  6. Imaging: dating US at 7w2d  7. Immunizations: received first COVID vaccine but did not receive second (had positive UPT the day she was due to receive the vaccine). Discussed this in detail regarding second dose, she will decide if she wants to receive this or not.     Follow up in 4 weeks.    Mikayla Shen MD  OB/GYN  3/12/2021 9:45 AM

## 2021-03-12 NOTE — LETTER
March 19, 2021      Pat Mccabe   [address] [place] [id number]        Dear Pat,     I am happy to inform you that your recent cervical cancer screening test ({Pap Exam Choices:656734}) was normal.      Preventative screenings such as this help to ensure your health for years to come. You should repeat a pap smear in {YEAR:841134}, unless otherwise directed.      You will still need to return to the clinic every year for your annual exam and other preventive tests.     If you have additional questions regarding this result, please call our registered nurse at [phone].      Sincerely,        Mikayla Shen MD

## 2021-03-13 LAB
BACTERIA SPEC CULT: NORMAL
SPECIMEN SOURCE: NORMAL
T PALLIDUM AB SER QL: NONREACTIVE

## 2021-03-14 LAB
HBV SURFACE AG SERPL QL IA: NONREACTIVE
HIV 1+2 AB+HIV1 P24 AG SERPL QL IA: NONREACTIVE

## 2021-03-15 LAB — RUBV IGG SERPL IA-ACNC: 33 IU/ML

## 2021-03-16 LAB
COPATH REPORT: NORMAL
PAP: NORMAL

## 2021-03-19 ENCOUNTER — TELEPHONE (OUTPATIENT)
Dept: OBGYN | Facility: OTHER | Age: 36
End: 2021-03-19

## 2021-03-19 LAB
FINAL DIAGNOSIS: NORMAL
HPV HR 12 DNA CVX QL NAA+PROBE: NEGATIVE
HPV16 DNA SPEC QL NAA+PROBE: NEGATIVE
HPV18 DNA SPEC QL NAA+PROBE: NEGATIVE
SPECIMEN DESCRIPTION: NORMAL
SPECIMEN SOURCE CVX/VAG CYTO: NORMAL

## 2021-03-19 NOTE — TELEPHONE ENCOUNTER
----- Message from Mikayla Shen MD sent at 3/19/2021  8:56 AM CDT -----  Please let patient know her pap and HPV were normal. Next due in 5 years  Mikayla Shen MD  OB/GYN  3/19/2021 8:56 AM    Left message for patient to return call. Brooke Michelle RN ....................  3/19/2021   9:07 AM

## 2021-03-19 NOTE — TELEPHONE ENCOUNTER
Call returned to patient. After verification of name and date of birth, patient notified of results per provider. Patient verbalizes understanding and has no further questions or concerns. Brooke Michelle RN ....................  3/19/2021   1:05 PM

## 2021-03-31 ENCOUNTER — PRENATAL OFFICE VISIT (OUTPATIENT)
Dept: OBGYN | Facility: OTHER | Age: 36
End: 2021-03-31
Attending: OBSTETRICS & GYNECOLOGY
Payer: COMMERCIAL

## 2021-03-31 VITALS
BODY MASS INDEX: 28.73 KG/M2 | SYSTOLIC BLOOD PRESSURE: 110 MMHG | WEIGHT: 178 LBS | DIASTOLIC BLOOD PRESSURE: 70 MMHG | HEART RATE: 60 BPM

## 2021-03-31 DIAGNOSIS — O09.521 ENCOUNTER FOR SUPERVISION OF MULTIGRAVIDA OF ADVANCED MATERNAL AGE IN FIRST TRIMESTER: Primary | ICD-10-CM

## 2021-03-31 LAB
C TRACH DNA SPEC QL NAA+PROBE: NOT DETECTED
N GONORRHOEA DNA SPEC QL NAA+PROBE: NOT DETECTED
SPECIMEN SOURCE: NORMAL

## 2021-03-31 PROCEDURE — 87491 CHLMYD TRACH DNA AMP PROBE: CPT | Mod: ZL | Performed by: OBSTETRICS & GYNECOLOGY

## 2021-03-31 PROCEDURE — 87591 N.GONORRHOEAE DNA AMP PROB: CPT | Mod: ZL | Performed by: OBSTETRICS & GYNECOLOGY

## 2021-03-31 PROCEDURE — 99207 PR OB VISIT-NO CHARGE - GICH ONLY: CPT | Performed by: OBSTETRICS & GYNECOLOGY

## 2021-03-31 ASSESSMENT — PAIN SCALES - GENERAL: PAINLEVEL: NO PAIN (0)

## 2021-03-31 NOTE — NURSING NOTE
Chief Complaint   Patient presents with     Prenatal Care     10w0d     Just feeling very nervous that something is wrong.   Peri Santacruz LPN........................3/31/2021  2:00 PM     Medication Reconciliation: completed   Peri Santacruz LPN  3/31/2021 1:59 PM

## 2021-03-31 NOTE — PROGRESS NOTES
Return OB Visit    S: Patient is in today with concerns about not feeling pregnant. Her nausea and breast tenderness have resolved, feeling less bloated.     O: /70 (BP Location: Right arm, Patient Position: Sitting, Cuff Size: Adult Large)   Pulse 60   Wt 80.7 kg (178 lb)   LMP 2021 (Exact Date)   Breastfeeding No   BMI 28.73 kg/m    Gen: Well-appearing, NAD  See OB Flowsheet    A/P:  Pat Mccabe is a 35 year old  at 10w0d by LMP c/w 7w2d US, here for OB follow up.    Prior  birth x2: both spontaneous. Reviewed [place] again, she is still considering, will decide at next visit    Advanced maternal age: again reviewed risks. Still considering ASA 81mg. Is leaning against genetic screening but will discuss with her  again.    PNC:  Rh positive, Rubella immune  Genetics: undecided but leaning against for now  Imaging: dating US at 7w2d  Immunizations: received first COVID vaccine but did not receive second (had positive UPT the day she was due to receive the vaccine).  RTC 4 weeks        Mikayla Shen MD  OB/GYN  3/31/2021 2:13 PM

## 2021-04-30 ENCOUNTER — PRENATAL OFFICE VISIT (OUTPATIENT)
Dept: OBGYN | Facility: OTHER | Age: 36
End: 2021-04-30
Attending: OBSTETRICS & GYNECOLOGY
Payer: COMMERCIAL

## 2021-04-30 VITALS
BODY MASS INDEX: 29.38 KG/M2 | HEART RATE: 64 BPM | WEIGHT: 182 LBS | DIASTOLIC BLOOD PRESSURE: 60 MMHG | SYSTOLIC BLOOD PRESSURE: 104 MMHG

## 2021-04-30 DIAGNOSIS — O22.00 VARICOSE VEINS DURING PREGNANCY, ANTEPARTUM: Primary | ICD-10-CM

## 2021-04-30 PROCEDURE — 99207 PR OB VISIT-NO CHARGE - GICH ONLY: CPT | Performed by: OBSTETRICS & GYNECOLOGY

## 2021-04-30 ASSESSMENT — PAIN SCALES - GENERAL: PAINLEVEL: MILD PAIN (3)

## 2021-04-30 NOTE — NURSING NOTE
Chief Complaint   Patient presents with     Prenatal Care     14w2d     Vein in Right leg  Causing pain  Peri Santacruz LPN........................4/30/2021  9:01 AM     Medication Reconciliation: completed   Peri Santacruz LPN  4/30/2021 8:50 AM

## 2021-04-30 NOTE — PROGRESS NOTES
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  Declines Sonia  4 week f/u

## 2021-05-08 ENCOUNTER — HEALTH MAINTENANCE LETTER (OUTPATIENT)
Age: 36
End: 2021-05-08

## 2021-06-04 ENCOUNTER — PRENATAL OFFICE VISIT (OUTPATIENT)
Dept: OBGYN | Facility: OTHER | Age: 36
End: 2021-06-04
Attending: OBSTETRICS & GYNECOLOGY
Payer: COMMERCIAL

## 2021-06-04 VITALS
SYSTOLIC BLOOD PRESSURE: 120 MMHG | HEART RATE: 80 BPM | DIASTOLIC BLOOD PRESSURE: 74 MMHG | BODY MASS INDEX: 29.18 KG/M2 | RESPIRATION RATE: 16 BRPM | WEIGHT: 180.8 LBS

## 2021-06-04 DIAGNOSIS — Z3A.20 20 WEEKS GESTATION OF PREGNANCY: Primary | ICD-10-CM

## 2021-06-04 PROCEDURE — 99207 PR OB VISIT-NO CHARGE - GICH ONLY: CPT | Performed by: OBSTETRICS & GYNECOLOGY

## 2021-06-04 ASSESSMENT — PAIN SCALES - GENERAL: PAINLEVEL: NO PAIN (0)

## 2021-06-04 NOTE — NURSING NOTE
"Chief Complaint   Patient presents with     Prenatal Care     19w 2d     Patient stated she has less pain in her right leg. No concerns today.    Initial /74 (BP Location: Right arm, Patient Position: Sitting, Cuff Size: Adult Regular)   Pulse 80   Resp 16   Wt 82 kg (180 lb 12.8 oz)   LMP 01/20/2021 (Exact Date)   Breastfeeding No   BMI 29.18 kg/m   Estimated body mass index is 29.18 kg/m  as calculated from the following:    Height as of 3/12/21: 1.676 m (5' 6\").    Weight as of this encounter: 82 kg (180 lb 12.8 oz).  Medication Reconciliation: Completed     Boom Cruz LPN  "

## 2021-06-11 ENCOUNTER — HOSPITAL ENCOUNTER (OUTPATIENT)
Dept: ULTRASOUND IMAGING | Facility: OTHER | Age: 36
Discharge: HOME | End: 2021-06-11
Attending: OBSTETRICS & GYNECOLOGY | Admitting: OBSTETRICS & GYNECOLOGY
Payer: COMMERCIAL

## 2021-06-11 DIAGNOSIS — Z3A.20 20 WEEKS GESTATION OF PREGNANCY: ICD-10-CM

## 2021-06-11 PROCEDURE — 76805 OB US >/= 14 WKS SNGL FETUS: CPT

## 2021-06-11 NOTE — PROGRESS NOTES
6/11  US shows growth about 80%  No abn seen though head difficult to visualize due to position  No previa

## 2021-07-06 ENCOUNTER — MYC MEDICAL ADVICE (OUTPATIENT)
Dept: FAMILY MEDICINE | Facility: OTHER | Age: 36
End: 2021-07-06

## 2021-07-09 ENCOUNTER — PRENATAL OFFICE VISIT (OUTPATIENT)
Dept: OBGYN | Facility: OTHER | Age: 36
End: 2021-07-09
Attending: OBSTETRICS & GYNECOLOGY
Payer: COMMERCIAL

## 2021-07-09 VITALS
RESPIRATION RATE: 16 BRPM | SYSTOLIC BLOOD PRESSURE: 116 MMHG | WEIGHT: 183.5 LBS | BODY MASS INDEX: 29.62 KG/M2 | HEART RATE: 80 BPM | DIASTOLIC BLOOD PRESSURE: 72 MMHG

## 2021-07-09 DIAGNOSIS — Z3A.24 24 WEEKS GESTATION OF PREGNANCY: Primary | ICD-10-CM

## 2021-07-09 LAB
ERYTHROCYTE [DISTWIDTH] IN BLOOD BY AUTOMATED COUNT: 13.3 % (ref 10–15)
GLUCOSE 1H P 50 G GLC PO SERPL-MCNC: 127 MG/DL (ref 60–129)
HCT VFR BLD AUTO: 36.5 % (ref 35–47)
HGB BLD-MCNC: 12.1 G/DL (ref 11.7–15.7)
MCH RBC QN AUTO: 29.9 PG (ref 26.5–33)
MCHC RBC AUTO-ENTMCNC: 33.2 G/DL (ref 31.5–36.5)
MCV RBC AUTO: 90 FL (ref 78–100)
PLATELET # BLD AUTO: 186 10E9/L (ref 150–450)
RBC # BLD AUTO: 4.05 10E12/L (ref 3.8–5.2)
WBC # BLD AUTO: 8.9 10E9/L (ref 4–11)

## 2021-07-09 PROCEDURE — 85027 COMPLETE CBC AUTOMATED: CPT | Mod: ZL | Performed by: OBSTETRICS & GYNECOLOGY

## 2021-07-09 PROCEDURE — 86780 TREPONEMA PALLIDUM: CPT | Mod: ZL | Performed by: OBSTETRICS & GYNECOLOGY

## 2021-07-09 PROCEDURE — 36415 COLL VENOUS BLD VENIPUNCTURE: CPT | Mod: ZL | Performed by: OBSTETRICS & GYNECOLOGY

## 2021-07-09 PROCEDURE — 82950 GLUCOSE TEST: CPT | Mod: ZL | Performed by: OBSTETRICS & GYNECOLOGY

## 2021-07-09 PROCEDURE — 99207 PR OB VISIT-NO CHARGE - GICH ONLY: CPT | Performed by: OBSTETRICS & GYNECOLOGY

## 2021-07-09 ASSESSMENT — PAIN SCALES - GENERAL: PAINLEVEL: NO PAIN (0)

## 2021-07-09 NOTE — NURSING NOTE
"Chief Complaint   Patient presents with     Prenatal Care     24w 2d     Patient stated no concerns today. Baby is very active. Lower back hurts most days, but feeling pretty well today.    Initial /72 (BP Location: Right arm, Patient Position: Sitting, Cuff Size: Adult Regular)   Pulse 80   Resp 16   Wt 83.2 kg (183 lb 8 oz)   LMP 01/20/2021 (Exact Date)   Breastfeeding No   BMI 29.62 kg/m   Estimated body mass index is 29.62 kg/m  as calculated from the following:    Height as of 3/12/21: 1.676 m (5' 6\").    Weight as of this encounter: 83.2 kg (183 lb 8 oz).  Medication Reconciliation: Completed     Boom Cruz LPN  "

## 2021-07-09 NOTE — PROGRESS NOTES
Return OB Visit    S: Patient is physically doing okay. About once a week cramping, no VB or LOF. +FM. Still dealing with the emotions related to her sister's loss of her baby at 31 weeks.     O: /72 (BP Location: Right arm, Patient Position: Sitting, Cuff Size: Adult Regular)   Pulse 80   Resp 16   Wt 83.2 kg (183 lb 8 oz)   LMP 2021 (Exact Date)   Breastfeeding No   BMI 29.62 kg/m    Gen: Well-appearing, NAD  See OB Flowsheet    A/P:  Pat Mccabe is a 35 year old  at 24w2d by LMP c/w 7w2d US, here for OB follow up.  Prior  birth x2: both spontaneous. Declined [place]  Advanced maternal age: on ASA 81mg, declined aneuploidy screening. Needs NSTs starting at 36 weeks     PNC:  Rh positive, Rubella immune  Genetics: declined  Imaging: dating US at 7w2d, normal anatomy except limited head views.  Immunizations: None  RTC 4 weeks- Tdap next visit, repeat US ord'd    Mikayla Shen MD  OB/GYN  2021 9:14 AM

## 2021-07-10 LAB — T PALLIDUM AB SER QL: NONREACTIVE

## 2021-07-14 DIAGNOSIS — Z20.7 SCABIES EXPOSURE: Primary | ICD-10-CM

## 2021-07-14 RX ORDER — PERMETHRIN 50 MG/G
CREAM TOPICAL
Qty: 60 G | Refills: 1 | Status: SHIPPED | OUTPATIENT
Start: 2021-07-14 | End: 2021-10-14

## 2021-07-14 NOTE — PROGRESS NOTES
Permethrin 5% send in for scabies in household. Treat again in 1 week if live mites appear. Discussed in office. Verified is safe for pregnant and lactating women. Sarahi Mcnamara NP on 7/14/2021 at 7:00 PM

## 2021-08-13 ENCOUNTER — HOSPITAL ENCOUNTER (OUTPATIENT)
Dept: ULTRASOUND IMAGING | Facility: OTHER | Age: 36
End: 2021-08-13
Attending: OBSTETRICS & GYNECOLOGY
Payer: COMMERCIAL

## 2021-08-13 ENCOUNTER — PRENATAL OFFICE VISIT (OUTPATIENT)
Dept: OBGYN | Facility: OTHER | Age: 36
End: 2021-08-13
Attending: OBSTETRICS & GYNECOLOGY
Payer: COMMERCIAL

## 2021-08-13 VITALS
HEART RATE: 76 BPM | SYSTOLIC BLOOD PRESSURE: 112 MMHG | BODY MASS INDEX: 30.34 KG/M2 | DIASTOLIC BLOOD PRESSURE: 60 MMHG | WEIGHT: 188 LBS

## 2021-08-13 DIAGNOSIS — Z3A.24 24 WEEKS GESTATION OF PREGNANCY: ICD-10-CM

## 2021-08-13 DIAGNOSIS — O09.529 SUPERVISION OF HIGH-RISK PREGNANCY OF ELDERLY MULTIGRAVIDA: Primary | ICD-10-CM

## 2021-08-13 PROCEDURE — 76816 OB US FOLLOW-UP PER FETUS: CPT

## 2021-08-13 PROCEDURE — 99207 PR OB VISIT-NO CHARGE - GICH ONLY: CPT | Performed by: OBSTETRICS & GYNECOLOGY

## 2021-08-13 ASSESSMENT — PAIN SCALES - GENERAL: PAINLEVEL: NO PAIN (0)

## 2021-08-13 NOTE — PROGRESS NOTES
Return OB Visit    S: Patient is feeling well overall. Having low back pain. Tried a maternity belt for work but doesn't help much. No ctx, VB or LOF. +FM    O: /60 (BP Location: Right arm, Patient Position: Sitting, Cuff Size: Adult Large)   Pulse 76   Wt 85.3 kg (188 lb)   LMP 2021 (Exact Date)   Breastfeeding No   BMI 30.34 kg/m    Gen: Well-appearing, NAD  See OB Flowsheet    A/P:  Pat Mccabe is a 35 year old  at 29w2d by LMP c/w 7w2d US, here for OB follow up.  Prior  birth x2: both spontaneous. Declined Sonia  Advanced maternal age: on ASA 81mg, declined aneuploidy screening. Needs NSTs starting at 36 weeks  Back pain: discussed other options, will call if she wants referral to try PT     PNC:  Rh positive, Rubella immune,   Genetics: declined  Imaging: dating US at 7w2d, normal anatomy after follow up  Immunizations: none  RTC 2 weeks- Tdap next visit per patient request    Mikayla Shen MD  OB/GYN  2021 9:12 AM

## 2021-08-13 NOTE — NURSING NOTE
FOOD SECURITY SCREENING QUESTIONS  Hunger Vital Signs:  Within the past 12 months we worried whether our food would run out before we got money to buy more. Never  Within the past 12 months the food we bought just didn't last and we didn't have money to get more. Never  Peri Santacruz LPN 8/13/2021 9:03 AM    Chief Complaint   Patient presents with     Prenatal Care     29W2D     Offers no complaints  Peri Santacruz LPN........................8/13/2021  9:04 AM     Medication Reconciliation: completed   Peri Santacruz LPN  8/13/2021 9:03 AM

## 2021-09-03 ENCOUNTER — PRENATAL OFFICE VISIT (OUTPATIENT)
Dept: OBGYN | Facility: OTHER | Age: 36
End: 2021-09-03
Attending: OBSTETRICS & GYNECOLOGY

## 2021-09-03 VITALS
WEIGHT: 191 LBS | HEART RATE: 64 BPM | DIASTOLIC BLOOD PRESSURE: 62 MMHG | BODY MASS INDEX: 30.83 KG/M2 | SYSTOLIC BLOOD PRESSURE: 114 MMHG

## 2021-09-03 DIAGNOSIS — O09.529 SUPERVISION OF HIGH-RISK PREGNANCY OF ELDERLY MULTIGRAVIDA: Primary | ICD-10-CM

## 2021-09-03 PROCEDURE — 90715 TDAP VACCINE 7 YRS/> IM: CPT | Performed by: OBSTETRICS & GYNECOLOGY

## 2021-09-03 PROCEDURE — 90471 IMMUNIZATION ADMIN: CPT | Performed by: OBSTETRICS & GYNECOLOGY

## 2021-09-03 PROCEDURE — 99207 PR OB VISIT-NO CHARGE - GICH ONLY: CPT | Performed by: OBSTETRICS & GYNECOLOGY

## 2021-09-03 ASSESSMENT — PAIN SCALES - GENERAL: PAINLEVEL: SEVERE PAIN (6)

## 2021-09-03 NOTE — NURSING NOTE
Chief Complaint   Patient presents with     Prenatal Care     32w2d     States she is haivng right side rib pain     Medication Reconciliation: completed   Peri Santacruz LPN  9/3/2021 10:32 AM

## 2021-09-03 NOTE — PROGRESS NOTES
Return OB Visit    S: Patient is uncomfortable but doing well. Having pelvic pain discomfort but no ctx. No VB or LOF. +FM    O: /62 (BP Location: Right arm, Patient Position: Sitting, Cuff Size: Adult Large)   Pulse 64   Wt 86.6 kg (191 lb)   LMP 2021 (Exact Date)   Breastfeeding No   BMI 30.83 kg/m    Gen: Well-appearing, NAD  See OB Flowsheet    A/P:  Pat Mccabe is a 35 year old  at 32w2d by LMP c/w 7w2d US, here for OB follow up.  Prior  birth x2: both spontaneous. Declined [place]  Advanced maternal age: on ASA 81mg, declined aneuploidy screening. Needs NSTs starting at 36 weeks  Back pain: discussed other options, will call if she wants referral to try PT     PNC:  Rh positive, Rubella immune,   Genetics: declined  Imaging: dating US at 7w2d, normal anatomy after follow up  Immunizations: s/p Tdap  RTC 2 weeks- GBS next visit    Mikayla Shen MD  OB/GYN  9/3/2021 10:31 AM

## 2021-09-09 ENCOUNTER — THERAPY VISIT (OUTPATIENT)
Dept: CHIROPRACTIC MEDICINE | Facility: OTHER | Age: 36
End: 2021-09-09
Attending: CHIROPRACTOR
Payer: COMMERCIAL

## 2021-09-09 DIAGNOSIS — M99.02 SEGMENTAL AND SOMATIC DYSFUNCTION OF THORACIC REGION: Primary | ICD-10-CM

## 2021-09-09 DIAGNOSIS — M54.6 RIGHT-SIDED THORACIC BACK PAIN, UNSPECIFIED CHRONICITY: ICD-10-CM

## 2021-09-09 PROCEDURE — 99213 OFFICE O/P EST LOW 20 MIN: CPT | Mod: 25 | Performed by: CHIROPRACTOR

## 2021-09-09 PROCEDURE — 98940 CHIROPRACT MANJ 1-2 REGIONS: CPT | Mod: AT | Performed by: CHIROPRACTOR

## 2021-09-09 NOTE — PROGRESS NOTES
PATIENT:  Pat Mccabe is a 35 year old female presenting for right-sided back pain    PROBLEM:   Date of Initial Visit for this Episode:  9/9/2021    Visit #1    SUBJECTIVE / HPI: Patient referred to our office by OB/GYN Dr. Ac ROTH for evaluation and treatment of right-sided back pain.  Patient has been dealing with symptoms for approximately 3 weeks.  Because unknown.  No overuse type injuries, no traumatic events associated with onset of symptoms.  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  Associate symptoms with current pregnancy.  Has been dealing with back pain during course of pregnancy.  Working with Dr. Leyda POWELL with [facility]. Unable to obtain appointment with current chiropractor which is why the patient is presenting to our office at this time. No reported upper or lower extremity radicular complaints, loss of bowel or bladder, saddle paresthesia or dropfoot symptoms.  Description and onset:  Duration and Frequency of Pain: 3 weeks and frequent nagging sensations  Radiation of pain: No  Pain rated at it's worst: 9/10  Pain rated currently:  8/10  Pain course: Not improving  Worse with:  sitting  Improved by: Sitting in a reclined chair with legs elevated, applying direct pressure over the area of pain  Additional Features: Currently 33 wk, 1 day pregnant at time of visit  Other Health Care Providers seen for this: Dr. Leyda MURILLO, Dr. Ac ROTH  Previous treatment: Chiropractic  Previous injury:Unremarkable      See flowsheets in chart for details.  9/9/2021   Oswestry (ANEESH) Questionnaire    OSWESTRY DISABILITY INDEX 9/9/2021   Count 9   Sum 10   Oswestry Score (%) 22.22   Some recent data might be hidden        Functional limitations:  Sleeping >6 hrs, Sitting increases symptoms, traveling>1hr (patient is traveling to [place] tomorrow). Symptoms non-responsive to home care exercises/stretching or tylenol    Past D.C. Care: yes, helpful    PAST  MEDICAL HISTORY:  Past Medical History:   Diagnosis Date     Syncope and collapse     2011,with abd pain, (-) workup       PAST SURGICAL HISTORY:  Past Surgical History:   Procedure Laterality Date     COLONOSCOPY      2011     LAPAROSCOPIC CHOLECYSTECTOMY      1/2/14,Cholecystectomy,Laparoscopic       ALLERGIES:  Allergies   Allergen Reactions     Amoxicillin-Pot Clavulanate GI Disturbance     Stomach cramps and diarrhea       CURRENT MEDICATIONS:  Current Outpatient Medications   Medication Sig Dispense Refill     ASPIRIN 81 PO Take 81 mg by mouth daily       COMPRESSION STOCKINGS 1 each       permethrin (ELIMITE) 5 % external cream Apply cream from head to toe (except the face); leave on for 8-14 hours then wash off with water; reapply in 1 week if live mites appear. 60 g 1     Prenatal Vit-Fe Fumarate-FA (PRENATAL MULTIVITAMIN  PLUS IRON) 27-1 MG TABS Take 1 tablet by mouth daily         SOCIAL HISTORY:  Marital Status: .  Occupation: Dental Hygienist with [employer] .  Alcohol use:not currently.  Tobacco use: Smoker: former.      FAMILY HISTORY:  Family History   Problem Relation Age of Onset     No Known Problems Mother      No Known Problems Father      No Known Problems Sister      No Known Problems Brother        Patient Active Problem List   Diagnosis   (none) - all problems resolved or deleted         ROS:  The patient denies any fevers, chills, nausea, vomiting, diarrhea, constipation,dysuria, hematuria, or urinary hesitancy or incontinence.  No shortness of breath, chest pain, or rashes.    OBJECTIVE:    DIAGNOSTICS:  No current spinal imaging taken.     PHYSICAL EXAM:     GENERAL APPEARANCE: healthy, alert, mild distress and cooperative   GAIT: NORMAL    MUSCULOSKELETAL:   Posture: Anterior head carriage, rounded shoulders.  Low right iliac crest, Low left shoulder.    Gait:  unremarkable.     Thoracic and Lumbar performed actively, measured approximately  ROM:  50/60 flexion 60/60  extension decreased symptoms   40/45 RLF    45/45 LLF   30/45 RR with pain      45/45 LR    +Kemps: Right TL junction  Other:  Ely's: -right, - left    +Tenderness: Right TL junction primarily T10/T12  +Muscle spasm: Right quadratus lumborum, paraspinals TL junction right side  +Joint asymmetry and restriction: T10 extension right rotation, T12 extension right rotation    ASSESSMENT: Pat Mccabe is a 35 year old female presenting primary complaints of right-sided back pain.  Evidence present of segmental/somatic dysfunction lower thoracic spine consistent with patient's symptoms.  Patient appears to be an excellent candidate for chiropractic care and I do anticipate a favorable response with course of treatment.  As patient is traveling this weekend I did encourage her to contact our office for follow-up treatment if symptoms require additional treatment before she travels as I believe upcoming car trip will likely aggravate symptoms.  Patient's work as a dental hygienist also likely to contribute to the symptoms as well.  Encourage patient to return to care with her primary chiropractor Dr. Leyda POWELL once she is available for patient care again.     1. Segmental and somatic dysfunction of thoracic region    2. Right-sided thoracic back pain, unspecified chronicity        PLAN    Evaluation and Management:  05101 Moderate exam established patient 20 min    Procedures:  Modalities:  None performed this visit    CMT:  63150 Chiropractic manipulative treatment 1-2 regions performed   Thoracic: Diversified, T10, T12, Prone    Therapeutic procedures:  None    Response to Treatment  Reduction in symptoms as reported by patient    Prognosis: Good    9/9/2021 Plan of Care:  6-8 visits of Chiropractic Care including Spinal Adjustments and/or physiotherapy and active rehabilitation, to include exercises in the office and/or at home to meet care plan goals.     Frequency: As needed for up to 4 weeks. A reevaluation  would be clinically appropriate in 6-8 visits, to determine progress and further course of care.    POC discussed and patient agreeable to plan of care.      9/9/2021 Goals:      Patient will report improved pain.   Patient will report able to travel to the [place].   Patient will report able to sleep without painful limits.   Patient will demonstrate an improved ability to complete Activities of Daily Living  as shown by a reported 10-30% reduced score on back index.    Patient will demonstrate improved ROM.        INSTRUCTIONS   ice 20 minutes every other hour as needed    Follow-up:  Return to care if symptoms persist.

## 2021-09-15 ENCOUNTER — THERAPY VISIT (OUTPATIENT)
Dept: CHIROPRACTIC MEDICINE | Facility: OTHER | Age: 36
End: 2021-09-15
Attending: CHIROPRACTOR
Payer: COMMERCIAL

## 2021-09-15 DIAGNOSIS — M54.6 RIGHT-SIDED THORACIC BACK PAIN, UNSPECIFIED CHRONICITY: ICD-10-CM

## 2021-09-15 DIAGNOSIS — M99.02 SEGMENTAL AND SOMATIC DYSFUNCTION OF THORACIC REGION: Primary | ICD-10-CM

## 2021-09-15 DIAGNOSIS — M99.04 SEGMENTAL AND SOMATIC DYSFUNCTION OF SACRAL REGION: ICD-10-CM

## 2021-09-15 PROCEDURE — 98940 CHIROPRACT MANJ 1-2 REGIONS: CPT | Mod: AT | Performed by: CHIROPRACTOR

## 2021-09-15 NOTE — PROGRESS NOTES
Visit #:  2/6-8    Subjective:  Pat Mccabe is a 35 year old female who is seen in f/u up for:        Segmental and somatic dysfunction of thoracic region  Right-sided thoracic back pain, unspecified chronicity  Segmental and somatic dysfunction of sacral region.     Since last visit on 9/9/2021,  Pat Mccabe reports: Following last treatment symptoms did quite well.  Patient was able to travel as originally planned without limitations due to her pain.  States that on Sunday during the return trip home pain levels began to return.  Yesterday patient was at work when pain increased to current levels.  Attempted to contact [facility] for follow-up care but was unable to obtain an appointment.  Patient then contacted our office but was unable to obtain an appointment yesterday as well but was able to schedule appointment for this morning.  Pain at the end of yesterday ranked 9/10. Has been utilizing ice which seems to be providing help.  Driving continues to be aggravating for symptoms stating that this morning when she awoke pain was ranked 2-3/10, currently pain rate 6/10 after driving to our office.  Patient currently 34 weeks pregnant    Area of chief complaint:  Thoracic :  Symptoms are graded at 6-9/10. The quality is described as like a nagging toothache.       Objective:  The following was observed:    P: palpatory tendernessRight PSIS, right side T10, right side T4:    A: static palpation demonstrates intersegmental asymmetry , thoracic, pelvis  R: motion palpation notes restricted motion, T4 , T10 and Sacrum   T: muscle spasm at level(s): Right latissimus dorsi, right quadratus lumborum, taut tender fibers noted right rhomboids and paraspinal musculature TL junction:      Segmental spinal dysfunction/restrictions found at:  :  T4 Right lateral flexion restricted and Extension restriction  T10 Right rotation restricted and Extension restriction  Sacrum Right lateral flexion  restricted and Extension restriction.      Assessment: Patient showing signs of progress.  Continue to follow-up with patient as needed as patient has primary care chiropractor at [facility] chiropract.  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    Diagnoses:      1. Segmental and somatic dysfunction of thoracic region    2. Right-sided thoracic back pain, unspecified chronicity    3. Segmental and somatic dysfunction of sacral region        Patient's condition:  Patient had restrictions pre-manipulation, Patient symptoms are gradually improving and Symptoms come and go    Treatment effectiveness:  Post manipulation there is better intersegmental movement, Patient claims to feel looser post manipulation, Patients symptoms are getting better, Tenderness is decreasing and Muscle spasm is reducing      Procedures:  CMT:  14649 Chiropractic manipulative treatment 1-2 regions performed   Thoracic: Diversified, T4, T10, Prone  Pelvis: Drop Table, Sacrum , Prone    Modalities:  None performed this visit    Therapeutic procedures:  None    Response to Treatment  Reduction in symptoms as reported by patient    Prognosis: Excellent    Progress towards Goals: Patient is making progress towards the goal.     Patient will report improved pain.              Patient will report able to travel to the [place].              Patient will report able to sleep without painful limits.              Patient will demonstrate an improved ability to complete Activities of Daily Living   as shown by a reported 10-30% reduced score on back index.               Patient will demonstrate improved ROM    Recommendations:    Instructions:ice 20 minutes every other hour as needed    Follow-up:  Return to care if symptoms persist.

## 2021-09-24 ENCOUNTER — PRENATAL OFFICE VISIT (OUTPATIENT)
Dept: OBGYN | Facility: OTHER | Age: 36
End: 2021-09-24
Attending: OBSTETRICS & GYNECOLOGY
Payer: COMMERCIAL

## 2021-09-24 VITALS
WEIGHT: 197 LBS | HEART RATE: 72 BPM | DIASTOLIC BLOOD PRESSURE: 72 MMHG | BODY MASS INDEX: 31.8 KG/M2 | SYSTOLIC BLOOD PRESSURE: 118 MMHG

## 2021-09-24 DIAGNOSIS — O09.529 SUPERVISION OF HIGH-RISK PREGNANCY OF ELDERLY MULTIGRAVIDA: Primary | ICD-10-CM

## 2021-09-24 PROCEDURE — 87081 CULTURE SCREEN ONLY: CPT | Mod: ZL | Performed by: OBSTETRICS & GYNECOLOGY

## 2021-09-24 PROCEDURE — 99207 PR OB VISIT-NO CHARGE - GICH ONLY: CPT | Performed by: OBSTETRICS & GYNECOLOGY

## 2021-09-24 ASSESSMENT — PAIN SCALES - GENERAL: PAINLEVEL: NO PAIN (0)

## 2021-09-24 NOTE — PROGRESS NOTES
Return OB Visit    S: Patient is feeling well. Not too uncomfortable yet. No ctx, VB or LOF. +FM    O: /72 (BP Location: Right arm, Patient Position: Sitting, Cuff Size: Adult Regular)   Pulse 72   Wt 89.4 kg (197 lb)   LMP 2021 (Exact Date)   Breastfeeding No   BMI 31.80 kg/m    Gen: Well-appearing, NAD  See OB Flowsheet    A/P:  Pat Mccabe is a 35 year old  at 35w2d by LMP c/w 7w2d US, here for OB follow up.  Prior  birth x2: both spontaneous. Declined [place]  Advanced maternal age: on ASA 81mg, declined aneuploidy screening. Needs NSTs starting at 36 weeks  Back pain: discussed other options, will call if she wants referral to try PT  Considering vasectomy, discussed post-procedure contraception      PNC:  Rh positive, Rubella immune,   Genetics: declined  Imaging: dating US at 7w2d, normal anatomy after follow up  Immunizations: s/p Tdap  RTC weekly with NSTs    Mikayla Shen MD  OB/GYN  2021 8:49 AM

## 2021-09-24 NOTE — NURSING NOTE
Chief Complaint   Patient presents with     Prenatal Care     35w2d     Offers no complaints, but would like you to peak at Right ear.   Peri Santacruz LPN........................9/24/2021  8:45 AM     Medication Reconciliation: completed   Peri Santacruz LPN  9/24/2021 8:44 AM

## 2021-09-26 LAB — BACTERIA SPEC CULT: NORMAL

## 2021-09-30 ENCOUNTER — PRENATAL OFFICE VISIT (OUTPATIENT)
Dept: OBGYN | Facility: OTHER | Age: 36
End: 2021-09-30
Attending: STUDENT IN AN ORGANIZED HEALTH CARE EDUCATION/TRAINING PROGRAM
Payer: COMMERCIAL

## 2021-09-30 VITALS
DIASTOLIC BLOOD PRESSURE: 72 MMHG | BODY MASS INDEX: 31.92 KG/M2 | OXYGEN SATURATION: 99 % | RESPIRATION RATE: 16 BRPM | HEIGHT: 66 IN | WEIGHT: 198.6 LBS | SYSTOLIC BLOOD PRESSURE: 118 MMHG | HEART RATE: 78 BPM

## 2021-09-30 DIAGNOSIS — O09.529 SUPERVISION OF HIGH-RISK PREGNANCY OF ELDERLY MULTIGRAVIDA: Primary | ICD-10-CM

## 2021-09-30 DIAGNOSIS — Z87.51 HISTORY OF PRETERM DELIVERY: ICD-10-CM

## 2021-09-30 PROCEDURE — 99207 PR OB VISIT-NO CHARGE - GICH ONLY: CPT | Performed by: STUDENT IN AN ORGANIZED HEALTH CARE EDUCATION/TRAINING PROGRAM

## 2021-09-30 RX ORDER — BREAST PUMP
EACH MISCELLANEOUS
Qty: 1 EACH | Refills: 0 | Status: SHIPPED | OUTPATIENT
Start: 2021-09-30 | End: 2022-06-06

## 2021-09-30 ASSESSMENT — MIFFLIN-ST. JEOR: SCORE: 1612.59

## 2021-09-30 NOTE — PROGRESS NOTES
"Return OB Visit    S: Ms. Mccabe is feeling well today. She has no acute concerns. Denies leaking of fluid, vaginal bleeding, painful contractions. Notes fetal movements.    O:   /72 (BP Location: Right arm, Patient Position: Semi-Rosado's, Cuff Size: Adult Regular)   Pulse 78   Resp 16   Ht 1.676 m (5' 6\")   Wt 90.1 kg (198 lb 9.6 oz)   LMP 2021 (Exact Date)   SpO2 99%   Breastfeeding No   BMI 32.05 kg/m      Gen: Well-appearing, NAD    NST: 120 bpm baseline, REACTIVE  FH: 37 cm    Assessment:  Ms. Pat Mccabe is a 35 year old yo  here for an OB follow up visit. She is currently 36w1d. This pregnancy is complicated by AMA and history of prior  deliveries.    Plan:  # Routine Prenatal Care  -- Dating: LMP=7 week US KRISTINA: 10/27/2021  -- PNLs:    O+, Ab screen neg   RPR nr   Hep B S Ag neg   Rubella immune   HIV neg   Pap: NIL, HPV negative   GC/Chlam neg  -- Genetic Screening: Declined  -- Immunizations: s/p Tdap  -- 1 hr GTT: 127  -- 3rd TM labs including CBC, RPR: Hgb 12.1, RPR nr  -- GBS: neg  -- Postpartum Planning: Vasectomy, breastfeeding  -- Delivery Planning: Plan for delivery around due date for AMA  -- Return to clinic in 1 week for OB follow up visit and NST    # Advanced maternal age  -- ASA 81 mg  -- NST starting at 36 weeks   Today REACTIVE: baseline 120 bpm    # History of  deliveries  -- 36 week   -- Declined lauren Rushing MD  OB/GYN  2021 2:58 PM        "

## 2021-09-30 NOTE — PROGRESS NOTES
"Pt. Here for prenatal check 36w 1day. No contractions or cramping, no leaking of fluid, did have some blood when wiping after urination early in the week x1, baby has been active.     FOOD SECURITY SCREENING QUESTIONS  Hunger Vital Signs:  Within the past 12 months we worried whether our food would run out before we got money to buy more. Never  Within the past 12 months the food we bought just didn't last and we didn't have money to get more. Never    No chief complaint on file.      Initial /72 (BP Location: Right arm, Patient Position: Semi-Rosado's, Cuff Size: Adult Regular)   Pulse 78   Resp 16   Ht 1.676 m (5' 6\")   Wt 90.1 kg (198 lb 9.6 oz)   LMP 01/20/2021 (Exact Date)   SpO2 99%   Breastfeeding No   BMI 32.05 kg/m   Estimated body mass index is 32.05 kg/m  as calculated from the following:    Height as of this encounter: 1.676 m (5' 6\").    Weight as of this encounter: 90.1 kg (198 lb 9.6 oz).  Medication Reconciliation: complete    Radha Shen RN      "

## 2021-10-08 ENCOUNTER — PRENATAL OFFICE VISIT (OUTPATIENT)
Dept: OBGYN | Facility: OTHER | Age: 36
End: 2021-10-08
Attending: OBSTETRICS & GYNECOLOGY
Payer: COMMERCIAL

## 2021-10-08 VITALS
BODY MASS INDEX: 31.96 KG/M2 | HEART RATE: 80 BPM | SYSTOLIC BLOOD PRESSURE: 122 MMHG | WEIGHT: 198 LBS | DIASTOLIC BLOOD PRESSURE: 80 MMHG

## 2021-10-08 DIAGNOSIS — O09.529 SUPERVISION OF HIGH-RISK PREGNANCY OF ELDERLY MULTIGRAVIDA: Primary | ICD-10-CM

## 2021-10-08 PROCEDURE — 59025 FETAL NON-STRESS TEST: CPT | Performed by: OBSTETRICS & GYNECOLOGY

## 2021-10-08 PROCEDURE — 99207 PR OB VISIT-NO CHARGE - GICH ONLY: CPT | Performed by: OBSTETRICS & GYNECOLOGY

## 2021-10-08 ASSESSMENT — PAIN SCALES - GENERAL: PAINLEVEL: NO PAIN (0)

## 2021-10-08 NOTE — NURSING NOTE
Chief Complaint   Patient presents with     Prenatal Care     37w2d       Medication Reconciliation: completed   Peri Santacruz LPN  10/8/2021 9:44 AM

## 2021-10-08 NOTE — PROGRESS NOTES
Return OB Visit    S: Patient is feeling well. No ctx, VB or LOF. +FM. Ready to have baby.    O: /80 (BP Location: Right arm, Patient Position: Sitting, Cuff Size: Adult Large)   Pulse 80   Wt 89.8 kg (198 lb)   LMP 2021 (Exact Date)   Breastfeeding No   BMI 31.96 kg/m    Gen: Well-appearing, NAD  See OB Flowsheet    NST: 140, mod variability, + accels, no decels  [place]: irritability    A/P:  Pat Mccabe is a 35 year old  at 37w2d by LMP c/w 7w2d US, here for OB follow up.  Prior  birth x2: both spontaneous. Declined [place]  Advanced maternal age: on ASA 81mg, declined aneuploidy screening. Needs NSTs starting at 36 weeks  Back pain: discussed other options, will call if she wants referral to try PT  Considering vasectomy, discussed post-procedure contraception   Plans breastfeeding. Hoping to avoid epidural/ITN     PNC:  Rh positive, Rubella immune, , GBS negative  Genetics: declined  Imaging: dating US at 7w2d, normal anatomy after follow up  Immunizations: s/p Tdap  RTC weekly with NSTs    Mikayla Shen MD  OB/GYN  10/8/2021 9:42 AM      [facility]  [facility]  [address]  Dept: [phone]  Dept Fax: [phone]  Loc: [phone] [facility] MD [place]        10/19/2017      Patient:  Aubrey Narvaez  MRN:  [id number]  YOB: 1981  Date of Visit:  10/19/2017    Dear Dr. Hilary Su,    Thank you for referring Kate Gil to me for consultation. Please see attached visit summary with my findings. If you have any questions, please do not hesitate to call me.       Sincerely,         Zoltan Mcdonald MD

## 2021-10-11 ENCOUNTER — HOSPITAL ENCOUNTER (INPATIENT)
Facility: OTHER | Age: 36
LOS: 1 days | Discharge: HOME | End: 2021-10-12
Attending: OBSTETRICS & GYNECOLOGY | Admitting: OBSTETRICS & GYNECOLOGY
Payer: COMMERCIAL

## 2021-10-11 PROBLEM — Z36.89 ENCOUNTER FOR TRIAGE IN PREGNANT PATIENT: Status: ACTIVE | Noted: 2021-10-11

## 2021-10-11 LAB
ABO/RH(D): NORMAL
ANTIBODY SCREEN: NEGATIVE
BASOPHILS # BLD AUTO: 0 10E3/UL (ref 0–0.2)
BASOPHILS NFR BLD AUTO: 0 %
EOSINOPHIL # BLD AUTO: 0.1 10E3/UL (ref 0–0.7)
EOSINOPHIL NFR BLD AUTO: 1 %
ERYTHROCYTE [DISTWIDTH] IN BLOOD BY AUTOMATED COUNT: 12.9 % (ref 10–15)
HCT VFR BLD AUTO: 42.7 % (ref 35–47)
HGB BLD-MCNC: 14.3 G/DL (ref 11.7–15.7)
IMM GRANULOCYTES # BLD: 0 10E3/UL
IMM GRANULOCYTES NFR BLD: 0 %
LYMPHOCYTES # BLD AUTO: 2.8 10E3/UL (ref 0.8–5.3)
LYMPHOCYTES NFR BLD AUTO: 24 %
MCH RBC QN AUTO: 30.1 PG (ref 26.5–33)
MCHC RBC AUTO-ENTMCNC: 33.5 G/DL (ref 31.5–36.5)
MCV RBC AUTO: 90 FL (ref 78–100)
MONOCYTES # BLD AUTO: 0.6 10E3/UL (ref 0–1.3)
MONOCYTES NFR BLD AUTO: 5 %
NEUTROPHILS # BLD AUTO: 8.2 10E3/UL (ref 1.6–8.3)
NEUTROPHILS NFR BLD AUTO: 70 %
NRBC # BLD AUTO: 0 10E3/UL
NRBC BLD AUTO-RTO: 0 /100
PLATELET # BLD AUTO: 163 10E3/UL (ref 150–450)
RBC # BLD AUTO: 4.75 10E6/UL (ref 3.8–5.2)
SARS-COV-2 RNA RESP QL NAA+PROBE: NEGATIVE
SPECIMEN EXPIRATION DATE: NORMAL
WBC # BLD AUTO: 11.7 10E3/UL (ref 4–11)

## 2021-10-11 PROCEDURE — 59400 OBSTETRICAL CARE: CPT | Performed by: OBSTETRICS & GYNECOLOGY

## 2021-10-11 PROCEDURE — 120N000001 HC R&B MED SURG/OB

## 2021-10-11 PROCEDURE — 88307 TISSUE EXAM BY PATHOLOGIST: CPT

## 2021-10-11 PROCEDURE — 86780 TREPONEMA PALLIDUM: CPT | Performed by: OBSTETRICS & GYNECOLOGY

## 2021-10-11 PROCEDURE — 86900 BLOOD TYPING SEROLOGIC ABO: CPT | Performed by: OBSTETRICS & GYNECOLOGY

## 2021-10-11 PROCEDURE — 36415 COLL VENOUS BLD VENIPUNCTURE: CPT | Performed by: OBSTETRICS & GYNECOLOGY

## 2021-10-11 PROCEDURE — 85025 COMPLETE CBC W/AUTO DIFF WBC: CPT | Performed by: OBSTETRICS & GYNECOLOGY

## 2021-10-11 PROCEDURE — G0463 HOSPITAL OUTPT CLINIC VISIT: HCPCS | Mod: 25

## 2021-10-11 PROCEDURE — 0KQM0ZZ REPAIR PERINEUM MUSCLE, OPEN APPROACH: ICD-10-PCS | Performed by: OBSTETRICS & GYNECOLOGY

## 2021-10-11 PROCEDURE — 722N000001 HC LABOR CARE VAGINAL DELIVERY SINGLE

## 2021-10-11 PROCEDURE — 250N000009 HC RX 250: Performed by: OBSTETRICS & GYNECOLOGY

## 2021-10-11 PROCEDURE — 10907ZC DRAINAGE OF AMNIOTIC FLUID, THERAPEUTIC FROM PRODUCTS OF CONCEPTION, VIA NATURAL OR ARTIFICIAL OPENING: ICD-10-PCS | Performed by: OBSTETRICS & GYNECOLOGY

## 2021-10-11 PROCEDURE — U0005 INFEC AGEN DETEC AMPLI PROBE: HCPCS | Performed by: OBSTETRICS & GYNECOLOGY

## 2021-10-11 PROCEDURE — 250N000013 HC RX MED GY IP 250 OP 250 PS 637: Performed by: OBSTETRICS & GYNECOLOGY

## 2021-10-11 RX ORDER — ONDANSETRON 4 MG/1
4 TABLET, ORALLY DISINTEGRATING ORAL EVERY 6 HOURS PRN
Status: DISCONTINUED | OUTPATIENT
Start: 2021-10-11 | End: 2021-10-11 | Stop reason: HOSPADM

## 2021-10-11 RX ORDER — OXYTOCIN/0.9 % SODIUM CHLORIDE 30/500 ML
340 PLASTIC BAG, INJECTION (ML) INTRAVENOUS CONTINUOUS PRN
Status: DISCONTINUED | OUTPATIENT
Start: 2021-10-11 | End: 2021-10-12 | Stop reason: HOSPADM

## 2021-10-11 RX ORDER — NALOXONE HYDROCHLORIDE 0.4 MG/ML
0.2 INJECTION, SOLUTION INTRAMUSCULAR; INTRAVENOUS; SUBCUTANEOUS
Status: DISCONTINUED | OUTPATIENT
Start: 2021-10-11 | End: 2021-10-11 | Stop reason: HOSPADM

## 2021-10-11 RX ORDER — OXYTOCIN 10 [USP'U]/ML
10 INJECTION, SOLUTION INTRAMUSCULAR; INTRAVENOUS
Status: DISCONTINUED | OUTPATIENT
Start: 2021-10-11 | End: 2021-10-12 | Stop reason: HOSPADM

## 2021-10-11 RX ORDER — IBUPROFEN 400 MG/1
800 TABLET, FILM COATED ORAL EVERY 6 HOURS PRN
Status: DISCONTINUED | OUTPATIENT
Start: 2021-10-11 | End: 2021-10-12 | Stop reason: HOSPADM

## 2021-10-11 RX ORDER — CARBOPROST TROMETHAMINE 250 UG/ML
250 INJECTION, SOLUTION INTRAMUSCULAR
Status: DISCONTINUED | OUTPATIENT
Start: 2021-10-11 | End: 2021-10-12 | Stop reason: HOSPADM

## 2021-10-11 RX ORDER — ONDANSETRON 2 MG/ML
4 INJECTION INTRAMUSCULAR; INTRAVENOUS EVERY 6 HOURS PRN
Status: DISCONTINUED | OUTPATIENT
Start: 2021-10-11 | End: 2021-10-11 | Stop reason: HOSPADM

## 2021-10-11 RX ORDER — METOCLOPRAMIDE HYDROCHLORIDE 5 MG/ML
10 INJECTION INTRAMUSCULAR; INTRAVENOUS EVERY 6 HOURS PRN
Status: DISCONTINUED | OUTPATIENT
Start: 2021-10-11 | End: 2021-10-11 | Stop reason: HOSPADM

## 2021-10-11 RX ORDER — PROCHLORPERAZINE 25 MG
25 SUPPOSITORY, RECTAL RECTAL EVERY 12 HOURS PRN
Status: DISCONTINUED | OUTPATIENT
Start: 2021-10-11 | End: 2021-10-11 | Stop reason: HOSPADM

## 2021-10-11 RX ORDER — DOCUSATE SODIUM 100 MG/1
100 CAPSULE, LIQUID FILLED ORAL DAILY
Status: DISCONTINUED | OUTPATIENT
Start: 2021-10-11 | End: 2021-10-12 | Stop reason: HOSPADM

## 2021-10-11 RX ORDER — OXYTOCIN 10 [USP'U]/ML
10 INJECTION, SOLUTION INTRAMUSCULAR; INTRAVENOUS
Status: DISCONTINUED | OUTPATIENT
Start: 2021-10-11 | End: 2021-10-11 | Stop reason: HOSPADM

## 2021-10-11 RX ORDER — NALOXONE HYDROCHLORIDE 0.4 MG/ML
0.4 INJECTION, SOLUTION INTRAMUSCULAR; INTRAVENOUS; SUBCUTANEOUS
Status: DISCONTINUED | OUTPATIENT
Start: 2021-10-11 | End: 2021-10-11 | Stop reason: HOSPADM

## 2021-10-11 RX ORDER — MISOPROSTOL 100 UG/1
400 TABLET ORAL
Status: DISCONTINUED | OUTPATIENT
Start: 2021-10-11 | End: 2021-10-11 | Stop reason: HOSPADM

## 2021-10-11 RX ORDER — METHYLERGONOVINE MALEATE 0.2 MG/ML
200 INJECTION INTRAVENOUS
Status: DISCONTINUED | OUTPATIENT
Start: 2021-10-11 | End: 2021-10-11 | Stop reason: HOSPADM

## 2021-10-11 RX ORDER — PRENATAL VIT/IRON FUM/FOLIC AC 27MG-0.8MG
1 TABLET ORAL DAILY
Status: DISCONTINUED | OUTPATIENT
Start: 2021-10-11 | End: 2021-10-12 | Stop reason: HOSPADM

## 2021-10-11 RX ORDER — PROCHLORPERAZINE MALEATE 10 MG
10 TABLET ORAL EVERY 6 HOURS PRN
Status: DISCONTINUED | OUTPATIENT
Start: 2021-10-11 | End: 2021-10-11 | Stop reason: HOSPADM

## 2021-10-11 RX ORDER — OXYTOCIN/0.9 % SODIUM CHLORIDE 30/500 ML
340 PLASTIC BAG, INJECTION (ML) INTRAVENOUS CONTINUOUS PRN
Status: DISCONTINUED | OUTPATIENT
Start: 2021-10-11 | End: 2021-10-11 | Stop reason: HOSPADM

## 2021-10-11 RX ORDER — HYDROCORTISONE 2.5 %
CREAM (GRAM) TOPICAL 3 TIMES DAILY PRN
Status: DISCONTINUED | OUTPATIENT
Start: 2021-10-11 | End: 2021-10-12 | Stop reason: HOSPADM

## 2021-10-11 RX ORDER — TRANEXAMIC ACID 10 MG/ML
1 INJECTION, SOLUTION INTRAVENOUS EVERY 30 MIN PRN
Status: DISCONTINUED | OUTPATIENT
Start: 2021-10-11 | End: 2021-10-11 | Stop reason: HOSPADM

## 2021-10-11 RX ORDER — IBUPROFEN 600 MG/1
600 TABLET, FILM COATED ORAL
Status: DISCONTINUED | OUTPATIENT
Start: 2021-10-11 | End: 2021-10-11

## 2021-10-11 RX ORDER — OXYTOCIN 10 [USP'U]/ML
10 INJECTION, SOLUTION INTRAMUSCULAR; INTRAVENOUS
Status: DISCONTINUED | OUTPATIENT
Start: 2021-10-11 | End: 2021-10-11

## 2021-10-11 RX ORDER — KETOROLAC TROMETHAMINE 30 MG/ML
30 INJECTION, SOLUTION INTRAMUSCULAR; INTRAVENOUS
Status: DISCONTINUED | OUTPATIENT
Start: 2021-10-11 | End: 2021-10-11

## 2021-10-11 RX ORDER — METOCLOPRAMIDE 10 MG/1
10 TABLET ORAL EVERY 6 HOURS PRN
Status: DISCONTINUED | OUTPATIENT
Start: 2021-10-11 | End: 2021-10-11 | Stop reason: HOSPADM

## 2021-10-11 RX ORDER — BISACODYL 10 MG
10 SUPPOSITORY, RECTAL RECTAL DAILY PRN
Status: DISCONTINUED | OUTPATIENT
Start: 2021-10-11 | End: 2021-10-12 | Stop reason: HOSPADM

## 2021-10-11 RX ORDER — MISOPROSTOL 100 UG/1
400 TABLET ORAL
Status: DISCONTINUED | OUTPATIENT
Start: 2021-10-11 | End: 2021-10-12 | Stop reason: HOSPADM

## 2021-10-11 RX ORDER — LIDOCAINE 40 MG/G
CREAM TOPICAL
Status: DISCONTINUED | OUTPATIENT
Start: 2021-10-11 | End: 2021-10-11

## 2021-10-11 RX ORDER — TRANEXAMIC ACID 10 MG/ML
1 INJECTION, SOLUTION INTRAVENOUS EVERY 30 MIN PRN
Status: DISCONTINUED | OUTPATIENT
Start: 2021-10-11 | End: 2021-10-12 | Stop reason: HOSPADM

## 2021-10-11 RX ORDER — CARBOPROST TROMETHAMINE 250 UG/ML
250 INJECTION, SOLUTION INTRAMUSCULAR
Status: DISCONTINUED | OUTPATIENT
Start: 2021-10-11 | End: 2021-10-11 | Stop reason: HOSPADM

## 2021-10-11 RX ORDER — MODIFIED LANOLIN
OINTMENT (GRAM) TOPICAL
Status: DISCONTINUED | OUTPATIENT
Start: 2021-10-11 | End: 2021-10-12 | Stop reason: HOSPADM

## 2021-10-11 RX ORDER — METHYLERGONOVINE MALEATE 0.2 MG/ML
200 INJECTION INTRAVENOUS
Status: DISCONTINUED | OUTPATIENT
Start: 2021-10-11 | End: 2021-10-12 | Stop reason: HOSPADM

## 2021-10-11 RX ORDER — OXYTOCIN/0.9 % SODIUM CHLORIDE 30/500 ML
100-340 PLASTIC BAG, INJECTION (ML) INTRAVENOUS CONTINUOUS PRN
Status: DISCONTINUED | OUTPATIENT
Start: 2021-10-11 | End: 2021-10-11

## 2021-10-11 RX ORDER — ACETAMINOPHEN 325 MG/1
650 TABLET ORAL EVERY 4 HOURS PRN
Status: DISCONTINUED | OUTPATIENT
Start: 2021-10-11 | End: 2021-10-12 | Stop reason: HOSPADM

## 2021-10-11 RX ADMIN — Medication 340 ML/HR: at 09:09

## 2021-10-11 RX ADMIN — IBUPROFEN 800 MG: 400 TABLET, FILM COATED ORAL at 12:49

## 2021-10-11 RX ADMIN — PRENATAL VIT W/ FE FUMARATE-FA TAB 27-0.8 MG 1 TABLET: 27-0.8 TAB at 12:45

## 2021-10-11 RX ADMIN — DOCUSATE SODIUM 100 MG: 100 CAPSULE, LIQUID FILLED ORAL at 12:45

## 2021-10-11 RX ADMIN — LIDOCAINE HYDROCHLORIDE 5 ML: 10 INJECTION, SOLUTION EPIDURAL; INFILTRATION; INTRACAUDAL; PERINEURAL at 09:11

## 2021-10-11 NOTE — L&D DELIVERY NOTE
OB Vaginal Delivery Note    Pat Mccabe MRN# [id number]   Age: 35 year old YOB: 1985       GA: 37w5d  GP:   Labor Complications: None   EBL:   mL  Delivery QBL:    Delivery Type: Vaginal, Spontaneous   ROM to Delivery Time: rupture date or rupture time have not been documented  [place] Weight:     1 Minute 5 Minute 10 Minute   Apgar Totals:                 Delivery Details:  Description of Delivery:    Patient is a 35 year old yo G5 now P3 presenting with active labor at 37w5d.  She progressed in labor with AROM at 10 cm.  She ultimately progressed to complete and pushed atotal of 5 min delivering a liveborn male. Nuccal cord x  was noted and reduced with a true knot.  Anterior shoulder delivered easily with no maneuvers followed by the remainder of the baby and it was placed on the maternal abdomen. Placenta was delivered 5 minutes later intact with a 3 vessel cord and notable for a true know and umbilical varix.  There was a brief episode of postpartum uterine atony managed with IV pitocin and massage.  A 2nddegree midline perineal laceration was noted.  She was further anesthetized with 1% lidocaine and the lac repaired with 3-0 Vicryl.  The remainder of the birth canal was intact and rectal was negative with normal sphincter tone following the repair. All counts were correct following the repair. No complications occurred. Mom and baby are currently stable. EBL was less than 500 ml.         Alec Mccabe-Pat [[id number]]    Delivery/Placenta Date and Time    Delivery Date: 10/11/21 Delivery Time:  9:06 AM           Cord    Vessels: 3 Vessels    Cord Complications: Nuchal, Knot   Nuchal Intervention: reduced         Nuchal cord description: loose nuchal cord         Cord Blood Disposition: Lab    Gases Sent?: No    Delayed cord clamping?: Yes    Cord Clamping Delay (seconds): 31-60 seconds       Labor Events and Shoulder Dystocia    Fetal Tracing Prior to Delivery: Category  1  Shoulder dystocia present?: Neg     Delivery (Maternal) (Provider to Complete) ([id number])    Episiotomy: None  Perineal lacerations: 2nd Repaired?: Yes   Repair suture: 3-0 Vicryl  Genital tract inspection done: Pos     Blood Loss  Mother: Pat Mccabe Adwoa #[id number]   Start of Mother's Information    Delivery Blood Loss  10/10/21 2106 - 10/11/21 0923    None           End of Mother's Information  Mother: Pat Mccabe Adwoa #[id number]          Delivery - Provider to Complete ([id number])    Attempted Delivery Types (Choose all that apply): Spontaneous Vaginal Delivery  Delivery Type (Choose the 1 that will go to the Birth History): Vaginal, Spontaneous                                 Placenta    Removal: Spontaneous  Comments: cord varix near the origin at the placenta  Disposition: Pathology           Presentation and Position    Presentation: Vertex    Position: Left Occiput Anterior                 Florencio Santamaria MD

## 2021-10-11 NOTE — PLAN OF CARE
Patient bonding well with baby.  Denied pain.  Has been up independently in room.  Nursing with good latch/suck/swallow with skin to skin contact.  Breasts and nipples nontender.  Fundus firm at umbilicus, light bloody with some small clots.  Has 2nd degree tear, jose alfredo area swelling- ice for comfort.  Lungs clear, heart regular, bowel sounds active, no edema, CMS/peripheral pulses intact.  Appetite good, voiding with no difficulty.  Tennille Aviles RN on 10/11/2021 at 6:27 PM

## 2021-10-11 NOTE — PROGRESS NOTES
AROM per DB MD at 0855 clear fluid, patient went to complete at 0900, pushing at 0900, Delivery at 0906 per DB MD.

## 2021-10-11 NOTE — PROGRESS NOTES
35 year old  admitted to room 403 in active labor, onset labor at 0600. Membranes intact. Patient reports having had healthy pregnancy and planning for vaginal delivery. Hina ROTH OC and will attend [facility] and patient informed. XI Curtis notified and here as patient wants IT for labor pain. Rates labor pain 8/10. IV started and infusion started.

## 2021-10-11 NOTE — PROGRESS NOTES
Live male delivered spontaneously per DB MD. Baby to mothers arms. Mother and father very happy with baby boy.

## 2021-10-12 VITALS
RESPIRATION RATE: 16 BRPM | OXYGEN SATURATION: 97 % | DIASTOLIC BLOOD PRESSURE: 59 MMHG | HEART RATE: 79 BPM | TEMPERATURE: 97.3 F | SYSTOLIC BLOOD PRESSURE: 105 MMHG

## 2021-10-12 LAB
HGB BLD-MCNC: 12.8 G/DL (ref 11.7–15.7)
T PALLIDUM AB SER QL: NONREACTIVE

## 2021-10-12 PROCEDURE — 36415 COLL VENOUS BLD VENIPUNCTURE: CPT | Performed by: OBSTETRICS & GYNECOLOGY

## 2021-10-12 PROCEDURE — 85018 HEMOGLOBIN: CPT | Performed by: OBSTETRICS & GYNECOLOGY

## 2021-10-12 PROCEDURE — 99207 PR NO CHARGE LOS: CPT | Performed by: OBSTETRICS & GYNECOLOGY

## 2021-10-12 PROCEDURE — 250N000013 HC RX MED GY IP 250 OP 250 PS 637: Performed by: OBSTETRICS & GYNECOLOGY

## 2021-10-12 RX ORDER — IBUPROFEN 600 MG/1
600 TABLET, FILM COATED ORAL EVERY 6 HOURS PRN
Qty: 30 TABLET | Refills: 0 | Status: SHIPPED | OUTPATIENT
Start: 2021-10-12 | End: 2021-11-24

## 2021-10-12 RX ORDER — DOCUSATE SODIUM 100 MG/1
100 CAPSULE, LIQUID FILLED ORAL 2 TIMES DAILY PRN
Qty: 20 CAPSULE | Refills: 0 | Status: SHIPPED | OUTPATIENT
Start: 2021-10-12 | End: 2021-11-24

## 2021-10-12 RX ADMIN — PRENATAL VIT W/ FE FUMARATE-FA TAB 27-0.8 MG 1 TABLET: 27-0.8 TAB at 09:20

## 2021-10-12 RX ADMIN — DOCUSATE SODIUM 100 MG: 100 CAPSULE, LIQUID FILLED ORAL at 09:20

## 2021-10-12 NOTE — PLAN OF CARE
/58   Pulse 68   Temp 97.2  F (36.2  C)   Resp 16   LMP 01/20/2021 (Exact Date)   SpO2 97%   Breastfeeding Unknown     Patient up independently. Attentive to infant needs. Breast feeding infant independently. Fundus firm, bleeding light with no clots. Denies pain. Patient denies further needs at this time.

## 2021-10-12 NOTE — PROGRESS NOTES
Discharge Note      Data:  Pat Mccabe discharged to home at 1120 via ambulation. Accompanied by staff.    Action:  Written discharge/follow-up instructions were provided to patient. Prescriptions sent to patients preferred pharmacy. All belongings sent with patient.    Response:  Patient verbalized understanding of discharge instructions, reason for discharge, and necessary follow-up appointments.

## 2021-10-12 NOTE — DISCHARGE SUMMARY
[facility]    Discharge Summary  Obstetrics    Date of Admission:  10/11/2021  Date of Discharge:  10/12/2021  Discharging Provider: Florencio Santamaria    Discharge Diagnoses   Normal vaginal delivery    History of Present Illness   Pat Mccabe is a 35 year old female who presented with active labor and precipitous vaginal delivery.    Hospital Course   The patient's hospital course was unremarkable.  She recovered as anticipated and experienced no post-delivery complications.  On discharge, her pain was well controlled. Vaginal bleeding is similar to peak menstrual flow.  Voiding without difficulty.  Ambulating well and tolerating a normal diet.  No fevers.  Breastfeeding well.  Infant is stable.  She was discharged on post-partum day #2.    Post-partum hemoglobin:   Hemoglobin   Date Value Ref Range Status   10/12/2021 12.8 11.7 - 15.7 g/dL Final   2021 12.1 11.7 - 15.7 g/dL Final       Florencio Santamaria MD    Discharge Disposition   Discharged to home   Condition at discharge: Stable    Primary Care Physician   Renetta Fletcher    Consultations This Hospital Stay   HOME CARE POST PARTUM/ IP CONSULT  LACTATION IP CONSULT    Discharge Orders      Activity    Activity as tolerated     Reason for your hospital stay    Maternity care     Follow Up    Follow up with provider in 2 weeks and 6 weeks for post-delivery checks     Breast pump - Manual/Electric    Breast Pump Documentation:  Manual/Electric Pump: To support adequate breast milk production and nutrition for infant.     I, the undersigned, certify that the above prescribed supplies are medically necessary for this patient and is both reasonable and necessary in reference to accepted standards of medical and necessary in reference to accepted standards of medical practice in the treatment of this patient's condition and is not prescribed as a convenience.     Diet    Resume previous diet     Discharge Medications   Current  Discharge Medication List      START taking these medications    Details   docusate sodium (COLACE) 100 MG capsule Take 1 capsule (100 mg) by mouth 2 times daily as needed for constipation  Qty: 20 capsule, Refills: 0    Associated Diagnoses: Normal labor and delivery      ibuprofen (ADVIL/MOTRIN) 600 MG tablet Take 1 tablet (600 mg) by mouth every 6 hours as needed for other (cramping)  Qty: 30 tablet, Refills: 0    Associated Diagnoses: Normal labor and delivery         CONTINUE these medications which have NOT CHANGED    Details   COMPRESSION STOCKINGS 1 each      Misc. Devices (BREAST PUMP) [facility] Dispense 1 double electric breast pump per patient preference and insurance coverage. Length of need: 1 year  Qty: 1 each, Refills: 0    Associated Diagnoses: Supervision of high-risk pregnancy of elderly multigravida      permethrin (ELIMITE) 5 % external cream Apply cream from head to toe (except the face); leave on for 8-14 hours then wash off with water; reapply in 1 week if live mites appear.  Qty: 60 g, Refills: 1    Associated Diagnoses: Scabies exposure      Prenatal Vit-Fe Fumarate-FA (PRENATAL MULTIVITAMIN  PLUS IRON) 27-1 MG TABS Take 1 tablet by mouth daily         STOP taking these medications       ASPIRIN 81 PO Comments:   Reason for Stopping:             Allergies   Allergies   Allergen Reactions     Amoxicillin-Pot Clavulanate GI Disturbance     Stomach cramps and diarrhea

## 2021-10-12 NOTE — PROGRESS NOTES
[facility]    Post-Partum Progress Note    Assessment & Plan   Assessment:  Post-partum day #1  Normal spontaneous vaginal delivery    Doing well.    Plan:  Anticipate discharge tomorrow    Florencio Santamaria     Interval History   Doing well.  Pain is well-controlled.  No fevers.  No history of foul-smelling vaginal discharge.  Good appetite.  Denies chest pain, shortness of breath, nausea or vomiting.  Vaginal bleeding is similar to a heavy menstrual flow.  Ambulatory.  Breastfeeding well.    Medications     oxytocin in 0.9% NaCl Stopped (10/11/21 1300)       docusate sodium  100 mg Oral Daily     Measles, Mumps & Rubella Vac  0.5 mL Subcutaneous Once     prenatal multivitamin w/iron  1 tablet Oral Daily     Tdap (tetanus-diptheria-acell pertussis)  0.5 mL Intramuscular Once       Physical Exam   Temp: 97.2  F (36.2  C)   BP: 111/58 Pulse: 68   Resp: 16 SpO2: 97 % O2 Device: None (Room air)    There were no vitals filed for this visit.  Vital Signs with Ranges  Temp:  [97.2  F (36.2  C)] 97.2  F (36.2  C)  Pulse:  [60-75] 68  Resp:  [16-18] 16  BP: (111-127)/(58-97) 111/58  SpO2:  [97 %] 97 %  I/O last 3 completed shifts:  In: -   Out: 410 [Blood:410]    Uterine fundus is firm, non-tender and at the level of the umbilicus  Extremities Non-tender    Data   Recent Labs   Lab Test 10/11/21  0855 03/12/21  1003   ABO  --  O   RH  --  Pos   AS Negative Neg     Recent Labs   Lab Test 10/12/21  0704 10/11/21  0855   HGB 12.8 14.3     Recent Labs   Lab Test 03/12/21  1002   RUQIGG 33

## 2021-10-12 NOTE — PLAN OF CARE
Pt is post vaginal delivery day 1. Breastfeeding baby, bonding well. No pain. Fundus firm and U/1. Small rubra lochia. Verbalizes readiness to discharge home today.

## 2021-10-13 ENCOUNTER — MYC MEDICAL ADVICE (OUTPATIENT)
Dept: FAMILY MEDICINE | Facility: OTHER | Age: 36
End: 2021-10-13

## 2021-10-14 LAB
PATH REPORT.COMMENTS IMP SPEC: NORMAL
PATH REPORT.FINAL DX SPEC: NORMAL
PHOTO IMAGE: NORMAL

## 2021-10-15 ENCOUNTER — TELEPHONE (OUTPATIENT)
Dept: FAMILY MEDICINE | Facility: OTHER | Age: 36
End: 2021-10-15

## 2021-10-15 NOTE — TELEPHONE ENCOUNTER
Patient was informed of her 's metabolic test results    Sarahi Kemp CMA on 10/15/2021 at 2:13 PM

## 2021-10-23 ENCOUNTER — HEALTH MAINTENANCE LETTER (OUTPATIENT)
Age: 36
End: 2021-10-23

## 2021-10-26 ENCOUNTER — MEDICAL CORRESPONDENCE (OUTPATIENT)
Dept: HEALTH INFORMATION MANAGEMENT | Facility: OTHER | Age: 36
End: 2021-10-26

## 2021-10-26 DIAGNOSIS — H60.332 ACUTE SWIMMER'S EAR OF LEFT SIDE: Primary | ICD-10-CM

## 2021-10-26 RX ORDER — NEOMYCIN SULFATE, POLYMYXIN B SULFATE, HYDROCORTISONE 3.5; 10000; 1 MG/ML; [USP'U]/ML; MG/ML
3 SOLUTION/ DROPS AURICULAR (OTIC) 4 TIMES DAILY
Qty: 10 ML | Refills: 1 | Status: SHIPPED | OUTPATIENT
Start: 2021-10-26 | End: 2022-02-17

## 2021-11-01 ENCOUNTER — MYC MEDICAL ADVICE (OUTPATIENT)
Dept: FAMILY MEDICINE | Facility: OTHER | Age: 36
End: 2021-11-01

## 2021-11-04 ENCOUNTER — MYC MEDICAL ADVICE (OUTPATIENT)
Dept: FAMILY MEDICINE | Facility: OTHER | Age: 36
End: 2021-11-04

## 2021-11-04 NOTE — TELEPHONE ENCOUNTER
Message sent in parent chart instead of child.  Pinky Manzo LPN .............11/4/2021     9:14 AM

## 2021-11-23 ASSESSMENT — EDINBURGH POSTNATAL DEPRESSION SCALE (EPDS)
I HAVE FELT SAD OR MISERABLE: NOT VERY OFTEN
I HAVE BLAMED MYSELF UNNECESSARILY WHEN THINGS WENT WRONG: NO, NEVER
THE THOUGHT OF HARMING MYSELF HAS OCCURRED TO ME: NEVER
I HAVE FELT SCARED OR PANICKY FOR NO GOOD REASON: NO, NOT AT ALL
I HAVE BEEN ANXIOUS OR WORRIED FOR NO GOOD REASON: NO, NOT AT ALL
I HAVE BEEN ABLE TO LAUGH AND SEE THE FUNNY SIDE OF THINGS: AS MUCH AS I ALWAYS COULD
I HAVE BEEN SO UNHAPPY THAT I HAVE BEEN CRYING: NO, NEVER
THE THOUGHT OF HARMING MYSELF HAS OCCURRED TO ME: NEVER
I HAVE BEEN ANXIOUS OR WORRIED FOR NO GOOD REASON: NO, NOT AT ALL
I HAVE LOOKED FORWARD WITH ENJOYMENT TO THINGS: AS MUCH AS I EVER DID
I HAVE BLAMED MYSELF UNNECESSARILY WHEN THINGS WENT WRONG: NO, NEVER
I HAVE FELT SAD OR MISERABLE: NOT VERY OFTEN
I HAVE BEEN SO UNHAPPY THAT I HAVE BEEN CRYING: NO, NEVER
I HAVE FELT SCARED OR PANICKY FOR NO GOOD REASON: NO, NOT AT ALL
TOTAL SCORE: 1
THINGS HAVE BEEN GETTING ON TOP OF ME: NO, I HAVE BEEN COPING AS WELL AS EVER
I HAVE LOOKED FORWARD WITH ENJOYMENT TO THINGS: AS MUCH AS I EVER DID
I HAVE BEEN SO UNHAPPY THAT I HAVE HAD DIFFICULTY SLEEPING: NOT AT ALL
THINGS HAVE BEEN GETTING ON TOP OF ME: NO, I HAVE BEEN COPING AS WELL AS EVER
I HAVE BEEN ABLE TO LAUGH AND SEE THE FUNNY SIDE OF THINGS: AS MUCH AS I ALWAYS COULD
I HAVE BEEN SO UNHAPPY THAT I HAVE HAD DIFFICULTY SLEEPING: NOT AT ALL

## 2021-11-23 ASSESSMENT — PATIENT HEALTH QUESTIONNAIRE - PHQ9
10. IF YOU CHECKED OFF ANY PROBLEMS, HOW DIFFICULT HAVE THESE PROBLEMS MADE IT FOR YOU TO DO YOUR WORK, TAKE CARE OF THINGS AT HOME, OR GET ALONG WITH OTHER PEOPLE: NOT DIFFICULT AT ALL
SUM OF ALL RESPONSES TO PHQ QUESTIONS 1-9: 0
SUM OF ALL RESPONSES TO PHQ QUESTIONS 1-9: 0

## 2021-11-23 ASSESSMENT — ANXIETY QUESTIONNAIRES
GAD7 TOTAL SCORE: 3
3. WORRYING TOO MUCH ABOUT DIFFERENT THINGS: SEVERAL DAYS
7. FEELING AFRAID AS IF SOMETHING AWFUL MIGHT HAPPEN: NOT AT ALL
1. FEELING NERVOUS, ANXIOUS, OR ON EDGE: NOT AT ALL
8. IF YOU CHECKED OFF ANY PROBLEMS, HOW DIFFICULT HAVE THESE MADE IT FOR YOU TO DO YOUR WORK, TAKE CARE OF THINGS AT HOME, OR GET ALONG WITH OTHER PEOPLE?: NOT DIFFICULT AT ALL
GAD7 TOTAL SCORE: 3
7. FEELING AFRAID AS IF SOMETHING AWFUL MIGHT HAPPEN: NOT AT ALL
6. BECOMING EASILY ANNOYED OR IRRITABLE: SEVERAL DAYS
GAD7 TOTAL SCORE: 3
4. TROUBLE RELAXING: SEVERAL DAYS
2. NOT BEING ABLE TO STOP OR CONTROL WORRYING: NOT AT ALL
5. BEING SO RESTLESS THAT IT IS HARD TO SIT STILL: NOT AT ALL

## 2021-11-24 ENCOUNTER — PRENATAL OFFICE VISIT (OUTPATIENT)
Dept: FAMILY MEDICINE | Facility: OTHER | Age: 36
End: 2021-11-24
Attending: FAMILY MEDICINE
Payer: COMMERCIAL

## 2021-11-24 VITALS
TEMPERATURE: 96.4 F | BODY MASS INDEX: 29.73 KG/M2 | HEART RATE: 62 BPM | SYSTOLIC BLOOD PRESSURE: 110 MMHG | HEIGHT: 66 IN | RESPIRATION RATE: 20 BRPM | OXYGEN SATURATION: 98 % | DIASTOLIC BLOOD PRESSURE: 78 MMHG | WEIGHT: 185 LBS

## 2021-11-24 DIAGNOSIS — Z30.013 ENCOUNTER FOR INITIAL PRESCRIPTION OF INJECTABLE CONTRACEPTIVE: Primary | ICD-10-CM

## 2021-11-24 DIAGNOSIS — H60.393 INFECTIVE OTITIS EXTERNA, BILATERAL: ICD-10-CM

## 2021-11-24 PROCEDURE — 99207 PR POST-PARTUM 6 WK VISIT - GICH ONLY: CPT | Performed by: FAMILY MEDICINE

## 2021-11-24 PROCEDURE — 96372 THER/PROPH/DIAG INJ SC/IM: CPT | Performed by: FAMILY MEDICINE

## 2021-11-24 PROCEDURE — 250N000011 HC RX IP 250 OP 636: Performed by: FAMILY MEDICINE

## 2021-11-24 RX ORDER — CLINDAMYCIN HCL 300 MG
300 CAPSULE ORAL 4 TIMES DAILY
Qty: 40 CAPSULE | Refills: 0 | Status: SHIPPED | OUTPATIENT
Start: 2021-11-24 | End: 2021-12-04

## 2021-11-24 RX ORDER — MEDROXYPROGESTERONE ACETATE 150 MG/ML
150 INJECTION, SUSPENSION INTRAMUSCULAR
Status: DISCONTINUED | OUTPATIENT
Start: 2021-11-24 | End: 2022-05-16

## 2021-11-24 RX ORDER — FLUCONAZOLE 150 MG/1
150 TABLET ORAL DAILY
Qty: 3 TABLET | Refills: 0 | Status: SHIPPED | OUTPATIENT
Start: 2021-11-24 | End: 2021-11-27

## 2021-11-24 RX ADMIN — MEDROXYPROGESTERONE ACETATE 150 MG: 150 INJECTION, SUSPENSION, EXTENDED RELEASE INTRAMUSCULAR at 16:18

## 2021-11-24 ASSESSMENT — PATIENT HEALTH QUESTIONNAIRE - PHQ9: SUM OF ALL RESPONSES TO PHQ QUESTIONS 1-9: 0

## 2021-11-24 ASSESSMENT — MIFFLIN-ST. JEOR: SCORE: 1545.9

## 2021-11-24 ASSESSMENT — ANXIETY QUESTIONNAIRES: GAD7 TOTAL SCORE: 3

## 2021-11-24 ASSESSMENT — PAIN SCALES - GENERAL: PAINLEVEL: NO PAIN (0)

## 2021-11-24 NOTE — PROGRESS NOTES
"6 week Postpartum Visit Note    S:  Ms. Pat Mccabe is a 36 year old  here for her 6-week postpartum checkup.   - Had a  on 10/11/2021.  - Infant gender:  boy, weight 8 pounds 3.6 oz.  - Feeding Method:  .  Complications reported with feeding:  none, infant thriving.    - Bleeding:  None.  Duration:  4 weeks; spotting at end.  Menses resumed:  No.  No pelvic pains, discharge.  - Bowel/Urinary problems:  No    PHQ 2021   PHQ-9 Total Score 1 0   Q9: Thoughts of better off dead/self-harm past 2 weeks Not at all Not at all     - Contraception Planned:  Depo-Provera, ultimately vasectomy.  - She  has not had intercourse since delivery.  - Current tobacco use:  No  - Hx of Abuse:  No  ================================================================  ROS: 10 point ROS neg other than the symptoms noted above in the HPI.     O:  /78   Pulse 62   Temp (!) 96.4  F (35.8  C) (Tympanic)   Resp 20   Ht 1.676 m (5' 6\")   Wt 83.9 kg (185 lb)   LMP 2021 (Exact Date)   SpO2 98%   Breastfeeding Yes   BMI 29.86 kg/m    Gen: Well-appearing, NAD  Psych:  NEGATIVE  Breast: No concerns.  Is lactating, Nipples intact with no lesions, Non-tender and No S/S of yeast or mastitis  Abd:  Benign, Soft, flat, non-tender, No masses, organomegaly and Diastasis less than 1-2 FB      A/P:  Ms. Pat Mccabe is a 36 year old  here for 6 week postpartum visit after . Doing well.  - Contraception: DepoProvera  - Feeding: breast.  - Follow-up: yearly; sooner prn.    Renetta Fletcher, DO   Family Practice    Answers for HPI/ROS submitted by the patient on 2021  If you checked off any problems, how difficult have these problems made it for you to do your work, take care of things at home, or get along with other people?: Not difficult at all  PHQ9 TOTAL SCORE: 0  ISSA 7 TOTAL SCORE: 3      "

## 2021-11-24 NOTE — NURSING NOTE
"Chief Complaint   Patient presents with     Postpartum Care       Initial /78   Pulse 62   Temp (!) 96.4  F (35.8  C) (Tympanic)   Resp 20   Ht 1.676 m (5' 6\")   Wt 83.9 kg (185 lb)   LMP 01/20/2021 (Exact Date)   SpO2 98%   Breastfeeding Yes   BMI 29.86 kg/m   Estimated body mass index is 29.86 kg/m  as calculated from the following:    Height as of this encounter: 1.676 m (5' 6\").    Weight as of this encounter: 83.9 kg (185 lb).  Medication Reconciliation: complete    Maryann Malik LPN     FOOD SECURITY SCREENING QUESTIONS  Hunger Vital Signs:  Within the past 12 months we worried whether our food would run out before we got money to buy more. Never  Within the past 12 months the food we bought just didn't last and we didn't have money to get more. Never  Maryann Malik LPN 11/24/2021 3:27 PM    "

## 2021-12-02 ENCOUNTER — MYC MEDICAL ADVICE (OUTPATIENT)
Dept: FAMILY MEDICINE | Facility: OTHER | Age: 36
End: 2021-12-02
Payer: COMMERCIAL

## 2021-12-08 ENCOUNTER — MYC MEDICAL ADVICE (OUTPATIENT)
Dept: FAMILY MEDICINE | Facility: OTHER | Age: 36
End: 2021-12-08
Payer: COMMERCIAL

## 2021-12-10 ENCOUNTER — MYC MEDICAL ADVICE (OUTPATIENT)
Dept: FAMILY MEDICINE | Facility: OTHER | Age: 36
End: 2021-12-10
Payer: COMMERCIAL

## 2021-12-10 DIAGNOSIS — H60.60 CHRONIC OTITIS EXTERNA, UNSPECIFIED LATERALITY, UNSPECIFIED TYPE: Primary | ICD-10-CM

## 2021-12-10 RX ORDER — CEFDINIR 300 MG/1
300 CAPSULE ORAL 2 TIMES DAILY
Qty: 20 CAPSULE | Refills: 0 | Status: SHIPPED | OUTPATIENT
Start: 2021-12-10 | End: 2021-12-20

## 2021-12-10 RX ORDER — FLUCONAZOLE 150 MG/1
150 TABLET ORAL
Qty: 3 TABLET | Refills: 0 | Status: SHIPPED | OUTPATIENT
Start: 2021-12-10 | End: 2021-12-17

## 2021-12-13 DIAGNOSIS — H91.93 DECREASED HEARING OF BOTH EARS: Primary | ICD-10-CM

## 2022-01-14 ENCOUNTER — MEDICAL CORRESPONDENCE (OUTPATIENT)
Dept: HEALTH INFORMATION MANAGEMENT | Facility: OTHER | Age: 37
End: 2022-01-14
Payer: COMMERCIAL

## 2022-01-31 ENCOUNTER — OFFICE VISIT (OUTPATIENT)
Dept: AUDIOLOGY | Facility: OTHER | Age: 37
End: 2022-01-31
Attending: NURSE PRACTITIONER
Payer: COMMERCIAL

## 2022-01-31 ENCOUNTER — OFFICE VISIT (OUTPATIENT)
Dept: OTOLARYNGOLOGY | Facility: OTHER | Age: 37
End: 2022-01-31
Attending: NURSE PRACTITIONER
Payer: COMMERCIAL

## 2022-01-31 VITALS
WEIGHT: 185 LBS | DIASTOLIC BLOOD PRESSURE: 70 MMHG | HEIGHT: 66 IN | SYSTOLIC BLOOD PRESSURE: 118 MMHG | HEART RATE: 81 BPM | TEMPERATURE: 98.5 F | OXYGEN SATURATION: 98 % | BODY MASS INDEX: 29.73 KG/M2

## 2022-01-31 DIAGNOSIS — H69.93 DYSFUNCTION OF EUSTACHIAN TUBE, BILATERAL: ICD-10-CM

## 2022-01-31 DIAGNOSIS — H90.0 CONDUCTIVE HEARING LOSS, BILATERAL: Primary | ICD-10-CM

## 2022-01-31 DIAGNOSIS — H91.93 DECREASED HEARING OF BOTH EARS: ICD-10-CM

## 2022-01-31 DIAGNOSIS — H60.60 CHRONIC OTITIS EXTERNA, UNSPECIFIED LATERALITY, UNSPECIFIED TYPE: ICD-10-CM

## 2022-01-31 DIAGNOSIS — H60.393 INFECTIVE OTITIS EXTERNA, BILATERAL: Primary | ICD-10-CM

## 2022-01-31 PROCEDURE — 92557 COMPREHENSIVE HEARING TEST: CPT | Performed by: AUDIOLOGIST

## 2022-01-31 PROCEDURE — 87186 SC STD MICRODIL/AGAR DIL: CPT | Performed by: NURSE PRACTITIONER

## 2022-01-31 PROCEDURE — 87077 CULTURE AEROBIC IDENTIFY: CPT | Performed by: NURSE PRACTITIONER

## 2022-01-31 PROCEDURE — 87106 FUNGI IDENTIFICATION YEAST: CPT | Mod: 59 | Performed by: NURSE PRACTITIONER

## 2022-01-31 PROCEDURE — 87070 CULTURE OTHR SPECIMN AEROBIC: CPT | Performed by: NURSE PRACTITIONER

## 2022-01-31 PROCEDURE — 87106 FUNGI IDENTIFICATION YEAST: CPT | Performed by: NURSE PRACTITIONER

## 2022-01-31 PROCEDURE — 92504 EAR MICROSCOPY EXAMINATION: CPT | Performed by: NURSE PRACTITIONER

## 2022-01-31 PROCEDURE — 99213 OFFICE O/P EST LOW 20 MIN: CPT | Mod: 25 | Performed by: NURSE PRACTITIONER

## 2022-01-31 PROCEDURE — 92567 TYMPANOMETRY: CPT | Performed by: AUDIOLOGIST

## 2022-01-31 PROCEDURE — 87102 FUNGUS ISOLATION CULTURE: CPT | Mod: 91 | Performed by: NURSE PRACTITIONER

## 2022-01-31 PROCEDURE — 87205 SMEAR GRAM STAIN: CPT | Performed by: NURSE PRACTITIONER

## 2022-01-31 RX ORDER — SULFACETAMIDE SODIUM AND PREDNISOLONE SODIUM PHOSPHATE 100; 2.3 MG/ML; MG/ML
4 SOLUTION/ DROPS OPHTHALMIC 2 TIMES DAILY
Qty: 4 ML | Refills: 0 | Status: SHIPPED | OUTPATIENT
Start: 2022-01-31 | End: 2022-02-10

## 2022-01-31 ASSESSMENT — PAIN SCALES - GENERAL: PAINLEVEL: NO PAIN (1)

## 2022-01-31 ASSESSMENT — MIFFLIN-ST. JEOR: SCORE: 1545.9

## 2022-01-31 NOTE — NURSING NOTE
"Chief Complaint   Patient presents with     Follow Up     HEA       Initial /70 (BP Location: Right arm, Patient Position: Sitting, Cuff Size: Adult Regular)   Pulse 81   Temp 98.5  F (36.9  C) (Tympanic)   Ht 1.676 m (5' 6\")   Wt 83.9 kg (185 lb)   SpO2 98%   BMI 29.86 kg/m   Estimated body mass index is 29.86 kg/m  as calculated from the following:    Height as of this encounter: 1.676 m (5' 6\").    Weight as of this encounter: 83.9 kg (185 lb).  Medication Reconciliation: complete  Alejandra Ramirez LPN    "

## 2022-01-31 NOTE — PROGRESS NOTES
Otolaryngology Note         Chief Complaint:     Patient presents with:  Follow Up: HEA           History of Present Illness:     Pat Mccabe is a 36 year old female seen today for concerns with itching in ear, then fluid feeling in the ears.  She has a history of recurrent otitis externa, typically used drops in the past with good improvement.     She reports she was pregnant preceding the onset of symptoms.      She reports the right ear is worse than the left.    She denies crackling sensation in ears.     Symptoms have been stable for several months.    Hearing is decreased. No previous known hearing loss.  No real history of middle ear infections in the past.      She feels plugged in both ears.  She has decreased hearing and tries to manipulate the ears to help the eustachian tubes drain     She has been treated with multiple different drops and oral abx.  She was treated with Clindamycin without improvement.  She was then given a prescription for Cefdinir but didn't fill it    She is a former smoker   She quit smoking when her tonsils were removed, Dr Cortez removed tonsils in 2018.     Patient has no history of otological surgeries or procedures  No current concerns with otalgia  She has moisture in her ears, feels it when she is scratching her ears.      No history of noise exposure  No vertigo, facial numbness or tingling.      She last used abx drops a couple of weeks ago.      She complains of nasal congestion for the past couple of weeks.  Her children have been sick off and off.  No facial pain or pressure.  No headaches.           Medications:     Current Outpatient Rx   Medication Sig Dispense Refill     sulfacetamide-prednisoLONE (VASOCIDIN) 10-0.23 % ophthalmic solution Place 4 drops into both ears 2 times daily for 10 days 4 mL 0     Misc. Devices (BREAST PUMP) MISC Dispense 1 double electric breast pump per patient preference and insurance coverage. Length of need: 1 year 1 each 0      "neomycin-polymyxin-hydrocortisone (CORTISPORIN) 3.5-26127-3 otic solution Place 3 drops Into the left ear 4 times daily (Patient not taking: Reported on 2022) 10 mL 1     Prenatal Vit-Fe Fumarate-FA (PRENATAL MULTIVITAMIN  PLUS IRON) 27-1 MG TABS Take 1 tablet by mouth daily              Allergies:     Allergies: Amoxicillin-pot clavulanate          Past Medical History:     Past Medical History:   Diagnosis Date     Syncope and collapse     ,with abd pain, (-) workup            Past Surgical History:     Past Surgical History:   Procedure Laterality Date     COLONOSCOPY           LAPAROSCOPIC CHOLECYSTECTOMY      14,Cholecystectomy,Laparoscopic       ENT family history reviewed         Social History:     Social History     Tobacco Use     Smoking status: Former Smoker     Packs/day: 0.20     Types: Cigarettes     Quit date: 2013     Years since quittin.2     Smokeless tobacco: Never Used     Tobacco comment: Quit 3 years    Vaping Use     Vaping Use: Never used   Substance Use Topics     Alcohol use: Not Currently     Alcohol/week: 0.0 standard drinks     Comment: occasional, 3 per week      Drug use: Never            Review of Systems:     ROS: See HPI         Physical Exam:     /70 (BP Location: Right arm, Patient Position: Sitting, Cuff Size: Adult Regular)   Pulse 81   Temp 98.5  F (36.9  C) (Tympanic)   Ht 1.676 m (5' 6\")   Wt 83.9 kg (185 lb)   SpO2 98%   BMI 29.86 kg/m    General - The patient is well nourished and well developed, and appears to have good nutritional status.  Alert and oriented to person and place, answers questions and cooperates with examination appropriately.   Head and Face - Normocephalic and atraumatic, with no gross asymmetry noted.  The facial nerve is intact, with strong symmetric movements.  Voice and Breathing - The patient was breathing comfortably without the use of accessory muscles. There was no wheezing, stridor. The patients voice was " clear and strong, and had appropriate pitch and quality.  Ears - External ear normal. Canals have thick white otorrhea deep in the canal bilaterally.   A culture was obtained from both ears.  The right ear was debrided with gentle suctioning.  I instilled sterile saline drops into the ear to help remove the thick otorrhea.  There is mild debris remaining on the TM, but visualized TM appears intact without effusion.  The left canal was debrided in the same manor.  Left tympanic membrane is intact without effusion, retraction or mass.  Eyes - Extraocular movements intact, sclera were not icteric or injected, conjunctiva were pink and moist.  Mouth - Examination of the oral cavity showed pink, healthy oral mucosa. Dentition in good condition. No lesions or ulcerations noted. The tongue was mobile and midline.   Throat - The walls of the oropharynx were smooth, pink, moist, symmetric, and had no lesions or ulcerations.  The tonsillar pillars and soft palate were symmetric. The uvula was midline on elevation.    Neck - Normal midline excursion of the laryngotracheal complex during swallowing.  Full range of motion on passive movement.  Palpation of the occipital, submental, submandibular, internal jugular chain, and supraclavicular nodes did not demonstrate any abnormal lymph nodes or masses.  Palpation of the thyroid was soft and smooth, with no nodules or goiter appreciated.  The trachea was mobile and midline.  Nose - External contour is symmetric, no gross deflection or scars.  Nasal mucosa is pink and moist with no abnormal mucus.  The septum and turbinates were evaluated with nasal speculum, no polyps, masses, or purulence noted on examination of anterior nasal cavity.         Assessment and Plan:       ICD-10-CM    1. Infective otitis externa, bilateral  H60.393 Respiratory Aerobic Bacterial Culture     Gram stain     Fungus Culture, non-blood     Respiratory Aerobic Bacterial Culture     Fungus Culture, non-blood      Gram stain     sulfacetamide-prednisoLONE (VASOCIDIN) 10-0.23 % ophthalmic solution     Gram stain     Fungus Culture, non-blood     Respiratory Aerobic Bacterial Culture     Fungus Culture, non-blood     Gram stain     Respiratory Aerobic Bacterial Culture   2. Chronic otitis externa, unspecified laterality, unspecified type  H60.60 Otolaryngology Referral       Bilateral cultures completed today, we will follow the cultures and change treatment as indicated.     Start vasocidin drops as prescribed    Follow up appointment with Angeles Ferguson in 2 weeks     Keep ears dry, use cotton ball with petroleum jelly to waterproof    Angeles Ferguson NP-C  [facility]

## 2022-01-31 NOTE — PATIENT INSTRUCTIONS
Thank you for allowing Angeles Ferguson and our ENT team to participate in your care.  If your medications are too expensive, please give the nurse a call.  We can possibly change this medication.  If you have a scheduling or an appointment question please contact our Health Unit Coordinator at their direct line [phone] ext [phone].   ALL nursing questions or concerns can be directed to your ENT nurse at: [phone] - Alejandra     Ear Culture done today, nurse will call you in 7-10 days with results     Ear Drops ordered,  at your pharmacy and take as directed    Follow up appointment with Angeles Ferguson in 2 weeks     Keep ears dry, use cotton ball with petroleum jelly to waterproof

## 2022-01-31 NOTE — LETTER
1/31/2022         RE: Pat Mccabe  [phone]  [facility] [facility] [address]        Dear Colleague,    Thank you for referring your patient, Pat Mccabe, to the [facility]. Please see a copy of my visit note below.    Otolaryngology Note         Chief Complaint:     Patient presents with:  Follow Up: HEA           History of Present Illness:     Pat Mccabe is a 36 year old female seen today for concerns with itching in ear, then fluid feeling in the ears.  She has a history of recurrent otitis externa, typically used drops in the past with good improvement.     She reports she was pregnant preceding the onset of symptoms.      She reports the right ear is worse than the left.    She denies crackling sensation in ears.     Symptoms have been stable for several months.    Hearing is decreased. No previous known hearing loss.  No real history of middle ear infections in the past.      She feels plugged in both ears.  She has decreased hearing and tries to manipulate the ears to help the eustachian tubes drain     She has been treated with multiple different drops and oral abx.  She was treated with Clindamycin without improvement.  She was then given a prescription for Cefdinir but didn't fill it    She is a former smoker   She quit smoking when her tonsils were removed, Dr Cortez removed tonsils in 2018.     Patient has no history of otological surgeries or procedures  No current concerns with otalgia  She has moisture in her ears, feels it when she is scratching her ears.      No history of noise exposure  No vertigo, facial numbness or tingling.      She last used abx drops a couple of weeks ago.      She complains of nasal congestion for the past couple of weeks.  Her children have been sick off and off.  No facial pain or pressure.  No headaches.           Medications:     Current Outpatient Rx   Medication Sig Dispense Refill     sulfacetamide-prednisoLONE (VASOCIDIN)  "10-0.23 % ophthalmic solution Place 4 drops into both ears 2 times daily for 10 days 4 mL 0     Misc. Devices (BREAST PUMP) MISC Dispense 1 double electric breast pump per patient preference and insurance coverage. Length of need: 1 year 1 each 0     neomycin-polymyxin-hydrocortisone (CORTISPORIN) 3.5-65387-1 otic solution Place 3 drops Into the left ear 4 times daily (Patient not taking: Reported on 2022) 10 mL 1     Prenatal Vit-Fe Fumarate-FA (PRENATAL MULTIVITAMIN  PLUS IRON) 27-1 MG TABS Take 1 tablet by mouth daily              Allergies:     Allergies: Amoxicillin-pot clavulanate          Past Medical History:     Past Medical History:   Diagnosis Date     Syncope and collapse     ,with abd pain, (-) workup            Past Surgical History:     Past Surgical History:   Procedure Laterality Date     COLONOSCOPY           LAPAROSCOPIC CHOLECYSTECTOMY      14,Cholecystectomy,Laparoscopic       ENT family history reviewed         Social History:     Social History     Tobacco Use     Smoking status: Former Smoker     Packs/day: 0.20     Types: Cigarettes     Quit date: 2013     Years since quittin.2     Smokeless tobacco: Never Used     Tobacco comment: Quit 3 years    Vaping Use     Vaping Use: Never used   Substance Use Topics     Alcohol use: Not Currently     Alcohol/week: 0.0 standard drinks     Comment: occasional, 3 per week      Drug use: Never            Review of Systems:     ROS: See HPI         Physical Exam:     /70 (BP Location: Right arm, Patient Position: Sitting, Cuff Size: Adult Regular)   Pulse 81   Temp 98.5  F (36.9  C) (Tympanic)   Ht 1.676 m (5' 6\")   Wt 83.9 kg (185 lb)   SpO2 98%   BMI 29.86 kg/m    General - The patient is well nourished and well developed, and appears to have good nutritional status.  Alert and oriented to person and place, answers questions and cooperates with examination appropriately.   Head and Face - Normocephalic and " atraumatic, with no gross asymmetry noted.  The facial nerve is intact, with strong symmetric movements.  Voice and Breathing - The patient was breathing comfortably without the use of accessory muscles. There was no wheezing, stridor. The patients voice was clear and strong, and had appropriate pitch and quality.  Ears - External ear normal. Canals have thick white otorrhea deep in the canal bilaterally.   A culture was obtained from both ears.  The right ear was debrided with gentle suctioning.  I instilled sterile saline drops into the ear to help remove the thick otorrhea.  There is mild debris remaining on the TM, but visualized TM appears intact without effusion.  The left canal was debrided in the same manor.  Left tympanic membrane is intact without effusion, retraction or mass.  Eyes - Extraocular movements intact, sclera were not icteric or injected, conjunctiva were pink and moist.  Mouth - Examination of the oral cavity showed pink, healthy oral mucosa. Dentition in good condition. No lesions or ulcerations noted. The tongue was mobile and midline.   Throat - The walls of the oropharynx were smooth, pink, moist, symmetric, and had no lesions or ulcerations.  The tonsillar pillars and soft palate were symmetric. The uvula was midline on elevation.    Neck - Normal midline excursion of the laryngotracheal complex during swallowing.  Full range of motion on passive movement.  Palpation of the occipital, submental, submandibular, internal jugular chain, and supraclavicular nodes did not demonstrate any abnormal lymph nodes or masses.  Palpation of the thyroid was soft and smooth, with no nodules or goiter appreciated.  The trachea was mobile and midline.  Nose - External contour is symmetric, no gross deflection or scars.  Nasal mucosa is pink and moist with no abnormal mucus.  The septum and turbinates were evaluated with nasal speculum, no polyps, masses, or purulence noted on examination of anterior nasal  cavity.         Assessment and Plan:       ICD-10-CM    1. Infective otitis externa, bilateral  H60.393 Respiratory Aerobic Bacterial Culture     Gram stain     Fungus Culture, non-blood     Respiratory Aerobic Bacterial Culture     Fungus Culture, non-blood     Gram stain     sulfacetamide-prednisoLONE (VASOCIDIN) 10-0.23 % ophthalmic solution     Gram stain     Fungus Culture, non-blood     Respiratory Aerobic Bacterial Culture     Fungus Culture, non-blood     Gram stain     Respiratory Aerobic Bacterial Culture   2. Chronic otitis externa, unspecified laterality, unspecified type  H60.60 Otolaryngology Referral       Bilateral cultures completed today, we will follow the cultures and change treatment as indicated.     Start vasocidin drops as prescribed    Follow up appointment with Angeles Ferguson in 2 weeks     Keep ears dry, use cotton ball with petroleum jelly to waterproof    Angeles HERRERA  [facility]        Again, thank you for allowing me to participate in the care of your patient.        Sincerely,        Angeles Ferguson NP

## 2022-01-31 NOTE — PROGRESS NOTES
Audiology Evaluation Completed. Please refer SCANNED AUDIOGRAM and/or TYMPANOGRAM for BACKGROUND, RESULTS, RECOMMENDATIONS.      Tina HILL, [facility]  Audiologist #[id number]

## 2022-02-01 LAB
GRAM STAIN RESULT: ABNORMAL

## 2022-02-01 NOTE — RESULT ENCOUNTER NOTE
She is currently on Vasocidin drops, we will continue to follow cultures for ID and susceptibility and change abx as needed.  NINO

## 2022-02-02 LAB
BACTERIA SPEC CULT: ABNORMAL
BACTERIA SPEC CULT: ABNORMAL

## 2022-02-02 NOTE — RESULT ENCOUNTER NOTE
Culture is susceptible to sulfa, stay on Vasocidin drops.  Keep both ears dry, follow up in 2 weeks.  JS

## 2022-02-03 LAB
BACTERIA SPEC CULT: ABNORMAL

## 2022-02-04 NOTE — RESULT ENCOUNTER NOTE
Culture also grew yeast.  Will have her finish the vasocidin drops and follow up for debridement and miconazole powder.  Thanks JS

## 2022-02-14 ENCOUNTER — OFFICE VISIT (OUTPATIENT)
Dept: OTOLARYNGOLOGY | Facility: OTHER | Age: 37
End: 2022-02-14
Attending: NURSE PRACTITIONER
Payer: COMMERCIAL

## 2022-02-14 VITALS
HEART RATE: 89 BPM | HEIGHT: 66 IN | DIASTOLIC BLOOD PRESSURE: 60 MMHG | OXYGEN SATURATION: 97 % | SYSTOLIC BLOOD PRESSURE: 102 MMHG | BODY MASS INDEX: 29.73 KG/M2 | TEMPERATURE: 98.3 F | WEIGHT: 185 LBS

## 2022-02-14 DIAGNOSIS — H60.393 INFECTIVE OTITIS EXTERNA, BILATERAL: Primary | ICD-10-CM

## 2022-02-14 PROCEDURE — 92504 EAR MICROSCOPY EXAMINATION: CPT | Performed by: NURSE PRACTITIONER

## 2022-02-14 PROCEDURE — 99213 OFFICE O/P EST LOW 20 MIN: CPT | Mod: 25 | Performed by: NURSE PRACTITIONER

## 2022-02-14 ASSESSMENT — MIFFLIN-ST. JEOR: SCORE: 1545.9

## 2022-02-14 ASSESSMENT — PAIN SCALES - GENERAL: PAINLEVEL: NO PAIN (0)

## 2022-02-14 NOTE — NURSING NOTE
"Chief Complaint   Patient presents with     Follow Up     2 wk/ Infective Ot       Initial /60 (BP Location: Right arm)   Pulse 89   Temp 98.3  F (36.8  C) (Tympanic)   Ht 1.676 m (5' 6\")   Wt 83.9 kg (185 lb)   SpO2 97%   BMI 29.86 kg/m   Estimated body mass index is 29.86 kg/m  as calculated from the following:    Height as of this encounter: 1.676 m (5' 6\").    Weight as of this encounter: 83.9 kg (185 lb).  Medication Reconciliation: complete  Carey Howard LPN  "

## 2022-02-14 NOTE — PATIENT INSTRUCTIONS
Stop the vasocidin drops  Keep the ears dry   Follow up in 2 weeks for recheck, sooner as needed with new or worsening symptoms      Thank you for allowing Angeles HERRERA and our ENT team to participate in your care.  If your medications are too expensive, please call my nurse at the number listed below.  We can possibly change this medication.    If you have a scheduling or an appointment question please contact our Health Unit Coordinator at their direct line [phone] ext [phone]  ALL nursing questions or concerns can be directed to my Nurse Alejandra [phone].     
11-Jun-2018

## 2022-02-14 NOTE — LETTER
2/14/2022         RE: Pat Mccabe  [address]  [facility] [address]        Dear Colleague,    Thank you for referring your patient, Pat Mccabe, to the [facility]. Please see a copy of my visit note below.    Otolaryngology Note         Chief Complaint:     Patient presents with:  Follow Up: 2 wk/ Infective Ot           History of Present Illness:     Pat Mccabe is a 36 year old female seen today for follow up ear check.      I last saw Pat on 1/31/22.  She had bilateral thick otorrhea that was cultured and debrided.  The culture grew enterobacter clocae complex that was susceptible to sulfa.  The culture also grew candida.  She was treated with Vasocidin drops.      She reports today that her ears are feeling much better.  She still has an occasional sharp pain in the left ear.      She has no further otorrhea.  She feels that her hearing is much improved    No tinnitus, chronic otalgia.           Medications:     Current Outpatient Rx   Medication Sig Dispense Refill     Misc. Devices (BREAST PUMP) MISC Dispense 1 double electric breast pump per patient preference and insurance coverage. Length of need: 1 year 1 each 0     Sulfacetamide-prednisoLONE (VASOCIDIN OP)        neomycin-polymyxin-hydrocortisone (CORTISPORIN) 3.5-77102-1 otic solution Place 3 drops Into the left ear 4 times daily (Patient not taking: Reported on 1/31/2022) 10 mL 1            Allergies:     Allergies: Amoxicillin-pot clavulanate          Past Medical History:     Past Medical History:   Diagnosis Date     Syncope and collapse     2011,with abd pain, (-) workup            Past Surgical History:     Past Surgical History:   Procedure Laterality Date     COLONOSCOPY      2011     LAPAROSCOPIC CHOLECYSTECTOMY      1/2/14,Cholecystectomy,Laparoscopic       ENT family history reviewed         Social History:     Social History     Tobacco Use     Smoking status: Former Smoker      "Packs/day: 0.20     Types: Cigarettes     Quit date: 2013     Years since quittin.2     Smokeless tobacco: Never Used     Tobacco comment: Quit 3 years    Vaping Use     Vaping Use: Never used   Substance Use Topics     Alcohol use: Not Currently     Alcohol/week: 0.0 standard drinks     Comment: occasional, 3 per week      Drug use: Never            Review of Systems:     ROS: See HPI         Physical Exam:     /60 (BP Location: Right arm)   Pulse 89   Temp 98.3  F (36.8  C) (Tympanic)   Ht 1.676 m (5' 6\")   Wt 83.9 kg (185 lb)   SpO2 97%   BMI 29.86 kg/m    General - The patient is well nourished and well developed, and appears to have good nutritional status.  Alert and oriented to person and place, answers questions and cooperates with examination appropriately.   Head and Face - Normocephalic and atraumatic, with no gross asymmetry noted.  The facial nerve is intact, with strong symmetric movements.  Voice and Breathing - The patient was breathing comfortably without the use of accessory muscles. There was no wheezing, stridor. The patients voice was clear and strong, and had appropriate pitch and quality.  Ears - The ears were examined under binocular microscopy and with otoscope.  External ear normal. Left canal is patent, right canal has minimal otorrhea, this was cleaned with # 3 suction, I instilled a small amount of sterile saline to loosen debris.  Right tympanic membrane is intact without effusion, retraction or mass. Left tympanic membrane is intact without effusion, retraction or mass.  I applied miconazole powder to both canals using Aerolizer.    Eyes - Extraocular movements intact, sclera were not icteric or injected.  Neck - no palpable lymphadenopathy           Assessment and Plan:       ICD-10-CM    1. Infective otitis externa, bilateral  H60.393      Stop the vasocidin drops  Keep the ears dry   Follow up in 2 weeks for recheck, sooner as needed with new or worsening " symptoms    Angeles Ferguson NP-C  [facility]        Again, thank you for allowing me to participate in the care of your patient.        Sincerely,        Angeles Ferguson NP

## 2022-02-14 NOTE — PROGRESS NOTES
Otolaryngology Note         Chief Complaint:     Patient presents with:  Follow Up: 2 wk/ Infective Ot           History of Present Illness:     Pat Mccabe is a 36 year old female seen today for follow up ear check.      I last saw Pat on 22.  She had bilateral thick otorrhea that was cultured and debrided.  The culture grew enterobacter clocae complex that was susceptible to sulfa.  The culture also grew candida.  She was treated with Vasocidin drops.      She reports today that her ears are feeling much better.  She still has an occasional sharp pain in the left ear.      She has no further otorrhea.  She feels that her hearing is much improved    No tinnitus, chronic otalgia.           Medications:     Current Outpatient Rx   Medication Sig Dispense Refill     Misc. Devices (BREAST PUMP) MISC Dispense 1 double electric breast pump per patient preference and insurance coverage. Length of need: 1 year 1 each 0     Sulfacetamide-prednisoLONE (VASOCIDIN OP)        neomycin-polymyxin-hydrocortisone (CORTISPORIN) 3.5-72415-2 otic solution Place 3 drops Into the left ear 4 times daily (Patient not taking: Reported on 2022) 10 mL 1            Allergies:     Allergies: Amoxicillin-pot clavulanate          Past Medical History:     Past Medical History:   Diagnosis Date     Syncope and collapse     ,with abd pain, (-) workup            Past Surgical History:     Past Surgical History:   Procedure Laterality Date     COLONOSCOPY           LAPAROSCOPIC CHOLECYSTECTOMY      14,Cholecystectomy,Laparoscopic       ENT family history reviewed         Social History:     Social History     Tobacco Use     Smoking status: Former Smoker     Packs/day: 0.20     Types: Cigarettes     Quit date: 2013     Years since quittin.2     Smokeless tobacco: Never Used     Tobacco comment: Quit 3 years    Vaping Use     Vaping Use: Never used   Substance Use Topics     Alcohol use: Not Currently      "Alcohol/week: 0.0 standard drinks     Comment: occasional, 3 per week      Drug use: Never            Review of Systems:     ROS: See HPI         Physical Exam:     /60 (BP Location: Right arm)   Pulse 89   Temp 98.3  F (36.8  C) (Tympanic)   Ht 1.676 m (5' 6\")   Wt 83.9 kg (185 lb)   SpO2 97%   BMI 29.86 kg/m    General - The patient is well nourished and well developed, and appears to have good nutritional status.  Alert and oriented to person and place, answers questions and cooperates with examination appropriately.   Head and Face - Normocephalic and atraumatic, with no gross asymmetry noted.  The facial nerve is intact, with strong symmetric movements.  Voice and Breathing - The patient was breathing comfortably without the use of accessory muscles. There was no wheezing, stridor. The patients voice was clear and strong, and had appropriate pitch and quality.  Ears - The ears were examined under binocular microscopy and with otoscope.  External ear normal. Left canal is patent, right canal has minimal otorrhea, this was cleaned with # 3 suction, I instilled a small amount of sterile saline to loosen debris.  Right tympanic membrane is intact without effusion, retraction or mass. Left tympanic membrane is intact without effusion, retraction or mass.  I applied miconazole powder to both canals using Aerolizer.    Eyes - Extraocular movements intact, sclera were not icteric or injected.  Neck - no palpable lymphadenopathy           Assessment and Plan:       ICD-10-CM    1. Infective otitis externa, bilateral  H60.393      Stop the vasocidin drops  Keep the ears dry   Follow up in 2 weeks for recheck, sooner as needed with new or worsening symptoms    Angeles Ferguson NP-C  [facility]    "

## 2022-02-16 ENCOUNTER — MYC MEDICAL ADVICE (OUTPATIENT)
Dept: FAMILY MEDICINE | Facility: OTHER | Age: 37
End: 2022-02-16
Payer: COMMERCIAL

## 2022-02-16 DIAGNOSIS — M62.830 BACK MUSCLE SPASM: Primary | ICD-10-CM

## 2022-02-17 ENCOUNTER — MYC MEDICAL ADVICE (OUTPATIENT)
Dept: FAMILY MEDICINE | Facility: OTHER | Age: 37
End: 2022-02-17
Payer: COMMERCIAL

## 2022-02-17 DIAGNOSIS — N61.0 MASTITIS IN FEMALE: Primary | ICD-10-CM

## 2022-02-17 RX ORDER — CYCLOBENZAPRINE HCL 5 MG
5 TABLET ORAL 3 TIMES DAILY PRN
Qty: 30 TABLET | Refills: 1 | Status: SHIPPED | OUTPATIENT
Start: 2022-02-17 | End: 2022-05-16

## 2022-02-17 RX ORDER — CEPHALEXIN 500 MG/1
500 CAPSULE ORAL 4 TIMES DAILY
Qty: 40 CAPSULE | Refills: 0 | Status: SHIPPED | OUTPATIENT
Start: 2022-02-17 | End: 2022-02-28

## 2022-02-27 NOTE — PROGRESS NOTES
Otolaryngology Note         Chief Complaint:     Patient presents with:  Follow Up: Ear check            History of Present Illness:     Pat Mccabe is a 36 year old female seen today for follow up ear check.  She has a history of bilateral otitis externa, culture + for candida and enterobacter.  She was treated with vasocidin drops followed by miconazole powder on follow up 2 weeks later.  I last saw Pat on 2/14, at that time her ears were feeling much better, exam showed significant improvement.  She was treated with miconazole powder and instructed to keep her ears dry.      She is due for follow up audiogram as she had canals full of otorrhea during last audiogram.  She has reported significantly improved hearing after debridement.     Today she reports she has been doing well, no further otorrhea or ear pain.  She feels that her hearing is improved.           Medications:     Current Outpatient Rx   Medication Sig Dispense Refill     clotrimazole (LOTRIMIN) 1 % external solution Apply 4 drops to the right ear twice daily x 10 days 10 mL 1     cyclobenzaprine (FLEXERIL) 5 MG tablet Take 1 tablet (5 mg) by mouth 3 times daily as needed for muscle spasms 30 tablet 1     Misc. Devices (BREAST PUMP) MISC Dispense 1 double electric breast pump per patient preference and insurance coverage. Length of need: 1 year 1 each 0     Sulfacetamide-prednisoLONE (VASOCIDIN OP)               Allergies:     Allergies: Amoxicillin-pot clavulanate          Past Medical History:     Past Medical History:   Diagnosis Date     Syncope and collapse     2011,with abd pain, (-) workup            Past Surgical History:     Past Surgical History:   Procedure Laterality Date     COLONOSCOPY      2011     LAPAROSCOPIC CHOLECYSTECTOMY      1/2/14,Cholecystectomy,Laparoscopic       ENT family history reviewed         Social History:     Social History     Tobacco Use     Smoking status: Former Smoker     Packs/day: 0.20     Types:  "Cigarettes     Quit date: 2013     Years since quittin.3     Smokeless tobacco: Never Used     Tobacco comment: Quit 3 years    Vaping Use     Vaping Use: Never used   Substance Use Topics     Alcohol use: Not Currently     Alcohol/week: 0.0 standard drinks     Comment: occasional, 3 per week      Drug use: Never            Review of Systems:     ROS: See HPI         Physical Exam:     /70 (BP Location: Right arm, Cuff Size: Adult Large)   Pulse 71   Temp 97.4  F (36.3  C) (Tympanic)   Ht 1.676 m (5' 6\")   Wt 83.9 kg (185 lb)   SpO2 98%   BMI 29.86 kg/m      General - The patient is well nourished and well developed, and appears to have good nutritional status.  Alert and oriented to person and place, answers questions and cooperates with examination appropriately.   Head and Face - Normocephalic and atraumatic, with no gross asymmetry noted.  The facial nerve is intact, with strong symmetric movements.  Voice and Breathing - The patient was breathing comfortably without the use of accessory muscles. There was no wheezing, stridor. The patients voice was clear and strong, and had appropriate pitch and quality.  Ears - External ear normal.  The ears were examined under binocular microscopy and with otoscope.  The left canal is patent and dry.  NO otorrhea or sporangia noted.  Minimal debridement of cerumen completed.  The right canal has scant otorrhea on the TM.  This was debrided to tolerance.  Bilateral TM's are intact, no effusion noted.     Eyes - Extraocular movements intact, sclera were not icteric or injected.  Mouth - Examination of the oral cavity showed pink, healthy oral mucosa. Dentition in good condition. No lesions or ulcerations noted. The tongue was mobile and midline.   Throat - The walls of the oropharynx were smooth, pink, moist, symmetric, and had no lesions or ulcerations.  The tonsillar pillars and soft palate were symmetric. The uvula was midline on elevation.    Neck - " Normal midline excursion of the laryngotracheal complex during swallowing.  Full range of motion on passive movement.  Palpation of the occipital, submental, submandibular, internal jugular chain, and supraclavicular nodes did not demonstrate any abnormal lymph nodes or masses.  Palpation of the thyroid was soft and smooth, with no nodules or goiter appreciated.  The trachea was mobile and midline.  Nose - External contour is symmetric, no gross deflection or scars.  Nasal mucosa is pink and moist with no abnormal mucus.  The septum and turbinates were evaluated with nasal speculum, no polyps, masses, or purulence noted on examination.         Assessment and Plan:       ICD-10-CM    1. 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

## 2022-02-28 ENCOUNTER — OFFICE VISIT (OUTPATIENT)
Dept: OTOLARYNGOLOGY | Facility: OTHER | Age: 37
End: 2022-02-28
Attending: NURSE PRACTITIONER
Payer: COMMERCIAL

## 2022-02-28 VITALS
SYSTOLIC BLOOD PRESSURE: 110 MMHG | BODY MASS INDEX: 29.73 KG/M2 | OXYGEN SATURATION: 98 % | TEMPERATURE: 97.4 F | HEART RATE: 71 BPM | DIASTOLIC BLOOD PRESSURE: 70 MMHG | HEIGHT: 66 IN | WEIGHT: 185 LBS

## 2022-02-28 DIAGNOSIS — H90.6 MIXED HEARING LOSS, BILATERAL: Primary | ICD-10-CM

## 2022-02-28 DIAGNOSIS — B37.84 CANDIDAL OTOMYCOSIS: ICD-10-CM

## 2022-02-28 PROCEDURE — 99213 OFFICE O/P EST LOW 20 MIN: CPT | Mod: 25 | Performed by: NURSE PRACTITIONER

## 2022-02-28 PROCEDURE — 92504 EAR MICROSCOPY EXAMINATION: CPT | Performed by: NURSE PRACTITIONER

## 2022-02-28 RX ORDER — CLOTRIMAZOLE 1 G/ML
SOLUTION TOPICAL
Qty: 10 ML | Refills: 1 | Status: SHIPPED | OUTPATIENT
Start: 2022-02-28 | End: 2022-04-22

## 2022-02-28 ASSESSMENT — PAIN SCALES - GENERAL: PAINLEVEL: NO PAIN (0)

## 2022-02-28 NOTE — NURSING NOTE
"Chief Complaint   Patient presents with     Follow Up     Ear check        Initial /70 (BP Location: Right arm, Cuff Size: Adult Large)   Pulse 71   Temp 97.4  F (36.3  C) (Tympanic)   Ht 1.676 m (5' 6\")   Wt 83.9 kg (185 lb)   SpO2 98%   BMI 29.86 kg/m   Estimated body mass index is 29.86 kg/m  as calculated from the following:    Height as of this encounter: 1.676 m (5' 6\").    Weight as of this encounter: 83.9 kg (185 lb).  Medication Reconciliation: complete  Jacquie Palma LPN    "

## 2022-02-28 NOTE — PATIENT INSTRUCTIONS
Keep ears dry  Start clotrimazole drops to the right ear as prescribed  Follow up in 2-3 weeks for recheck and audiogram      Thank you for allowing Angeles HERRERA and our ENT team to participate in your care.  If your medications are too expensive, please call my nurse at the number listed below.  We can possibly change this medication.    If you have a scheduling or an appointment question please contact our Health Unit Coordinator at their direct line [phone] ext [phone]  ALL nursing questions or concerns can be directed to my Nurse Alejandra [phone].

## 2022-02-28 NOTE — LETTER
2/28/2022         RE: Pat Mccabe  [address]  [facility] [address]        Dear Colleague,    Thank you for referring your patient, Pat Mccabe, to the [facility]. Please see a copy of my visit note below.    Otolaryngology Note         Chief Complaint:     Patient presents with:  Follow Up: Ear check            History of Present Illness:     Pat Mccabe is a 36 year old female seen today for follow up ear check.  She has a history of bilateral otitis externa, culture + for candida and enterobacter.  She was treated with vasocidin drops followed by miconazole powder on follow up 2 weeks later.  I last saw Pat on 2/14, at that time her ears were feeling much better, exam showed significant improvement.  She was treated with miconazole powder and instructed to keep her ears dry.      She is due for follow up audiogram as she had canals full of otorrhea during last audiogram.  She has reported significantly improved hearing after debridement.     Today she reports she has been doing well, no further otorrhea or ear pain.  She feels that her hearing is improved.           Medications:     Current Outpatient Rx   Medication Sig Dispense Refill     clotrimazole (LOTRIMIN) 1 % external solution Apply 4 drops to the right ear twice daily x 10 days 10 mL 1     cyclobenzaprine (FLEXERIL) 5 MG tablet Take 1 tablet (5 mg) by mouth 3 times daily as needed for muscle spasms 30 tablet 1     Misc. Devices (BREAST PUMP) MISC Dispense 1 double electric breast pump per patient preference and insurance coverage. Length of need: 1 year 1 each 0     Sulfacetamide-prednisoLONE (VASOCIDIN OP)               Allergies:     Allergies: Amoxicillin-pot clavulanate          Past Medical History:     Past Medical History:   Diagnosis Date     Syncope and collapse     2011,with abd pain, (-) workup            Past Surgical History:     Past Surgical History:   Procedure Laterality Date      "COLONOSCOPY           LAPAROSCOPIC CHOLECYSTECTOMY      14,Cholecystectomy,Laparoscopic       ENT family history reviewed         Social History:     Social History     Tobacco Use     Smoking status: Former Smoker     Packs/day: 0.20     Types: Cigarettes     Quit date: 2013     Years since quittin.3     Smokeless tobacco: Never Used     Tobacco comment: Quit 3 years    Vaping Use     Vaping Use: Never used   Substance Use Topics     Alcohol use: Not Currently     Alcohol/week: 0.0 standard drinks     Comment: occasional, 3 per week      Drug use: Never            Review of Systems:     ROS: See HPI         Physical Exam:     /70 (BP Location: Right arm, Cuff Size: Adult Large)   Pulse 71   Temp 97.4  F (36.3  C) (Tympanic)   Ht 1.676 m (5' 6\")   Wt 83.9 kg (185 lb)   SpO2 98%   BMI 29.86 kg/m      General - The patient is well nourished and well developed, and appears to have good nutritional status.  Alert and oriented to person and place, answers questions and cooperates with examination appropriately.   Head and Face - Normocephalic and atraumatic, with no gross asymmetry noted.  The facial nerve is intact, with strong symmetric movements.  Voice and Breathing - The patient was breathing comfortably without the use of accessory muscles. There was no wheezing, stridor. The patients voice was clear and strong, and had appropriate pitch and quality.  Ears - External ear normal.  The ears were examined under binocular microscopy and with otoscope.  The left canal is patent and dry.  NO otorrhea or sporangia noted.  Minimal debridement of cerumen completed.  The right canal has scant otorrhea on the TM.  This was debrided to tolerance.  Bilateral TM's are intact, no effusion noted.     Eyes - Extraocular movements intact, sclera were not icteric or injected.  Mouth - Examination of the oral cavity showed pink, healthy oral mucosa. Dentition in good condition. No lesions or ulcerations " noted. The tongue was mobile and midline.   Throat - The walls of the oropharynx were smooth, pink, moist, symmetric, and had no lesions or ulcerations.  The tonsillar pillars and soft palate were symmetric. The uvula was midline on elevation.    Neck - Normal midline excursion of the laryngotracheal complex during swallowing.  Full range of motion on passive movement.  Palpation of the occipital, submental, submandibular, internal jugular chain, and supraclavicular nodes did not demonstrate any abnormal lymph nodes or masses.  Palpation of the thyroid was soft and smooth, with no nodules or goiter appreciated.  The trachea was mobile and midline.  Nose - External contour is symmetric, no gross deflection or scars.  Nasal mucosa is pink and moist with no abnormal mucus.  The septum and turbinates were evaluated with nasal speculum, no polyps, masses, or purulence noted on examination.         Assessment and Plan:       ICD-10-CM    1. 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        Sincerely,        Angeles Ferguson NP

## 2022-03-01 ENCOUNTER — TELEPHONE (OUTPATIENT)
Dept: OTOLARYNGOLOGY | Facility: OTHER | Age: 37
End: 2022-03-01
Payer: COMMERCIAL

## 2022-03-01 LAB
BACTERIA SPEC CULT: ABNORMAL
BACTERIA SPEC CULT: ABNORMAL

## 2022-03-01 NOTE — TELEPHONE ENCOUNTER
Left secure VM to let her know that the new drops that were prescribed are different from what she had before.

## 2022-04-22 DIAGNOSIS — B37.84 CANDIDAL OTOMYCOSIS: ICD-10-CM

## 2022-04-22 RX ORDER — CLOTRIMAZOLE 1 G/ML
SOLUTION TOPICAL
Qty: 10 ML | Refills: 1 | Status: SHIPPED | OUTPATIENT
Start: 2022-04-22 | End: 2022-05-16

## 2022-05-04 ENCOUNTER — OFFICE VISIT (OUTPATIENT)
Dept: OTOLARYNGOLOGY | Facility: OTHER | Age: 37
End: 2022-05-04
Attending: NURSE PRACTITIONER
Payer: COMMERCIAL

## 2022-05-04 VITALS
WEIGHT: 190 LBS | SYSTOLIC BLOOD PRESSURE: 112 MMHG | HEIGHT: 66 IN | DIASTOLIC BLOOD PRESSURE: 72 MMHG | TEMPERATURE: 97.5 F | OXYGEN SATURATION: 97 % | BODY MASS INDEX: 30.53 KG/M2 | HEART RATE: 81 BPM

## 2022-05-04 DIAGNOSIS — H60.391 INFECTIVE OTITIS EXTERNA, RIGHT: Primary | ICD-10-CM

## 2022-05-04 PROCEDURE — 92504 EAR MICROSCOPY EXAMINATION: CPT | Performed by: NURSE PRACTITIONER

## 2022-05-04 PROCEDURE — 99213 OFFICE O/P EST LOW 20 MIN: CPT | Mod: 25 | Performed by: NURSE PRACTITIONER

## 2022-05-04 RX ORDER — SULFACETAMIDE SODIUM AND PREDNISOLONE SODIUM PHOSPHATE 100; 2.3 MG/ML; MG/ML
4 SOLUTION/ DROPS OPHTHALMIC 2 TIMES DAILY
Qty: 10 ML | Refills: 0 | Status: SHIPPED | OUTPATIENT
Start: 2022-05-04 | End: 2022-05-16

## 2022-05-04 ASSESSMENT — PAIN SCALES - GENERAL: PAINLEVEL: NO PAIN (0)

## 2022-05-04 NOTE — PATIENT INSTRUCTIONS
Thank you for allowing Angeles Ferguson and our ENT team to participate in your care.  If your medications are too expensive, please give the nurse a call.  We can possibly change this medication.  If you have a scheduling or an appointment question please contact our Health Unit Coordinator at their direct line [phone].   ALL nursing questions or concerns can be directed to your ENT nurse at: [phone] - Sbh     Keep the right ear dry  Start vasocidin drops  (sulfacetamide with prednisolone) 4 drops to the right ear 2 times daily x 12 days  Keep the scheduled appt for 5/16

## 2022-05-04 NOTE — PROGRESS NOTES
Otolaryngology Note         Chief Complaint:     Patient presents with:  Otalgia           History of Present Illness:     Pat Mccabe is a 36 year old female seen today for right sided ear pain.  I last saw Pat on 2/28/2022 for follow-up of otitis externa.  She has a longstanding history of recurrent otitis externa.  In the past she had typically use drops with good improvement.  When I saw her she had thick fluid in bilateral canals cultures were obtained for both ears and grew out both Candida and Enterobacter.  She was initially treated with debridement, Vasocidin drops and miconazole powder with resolution in symptoms.  She was scheduled for follow-up audiogram, but this has not been completed yet.    Over the last 2-1/2 months she has had a couple episodes of right ear swelling and tenderness.  She has treated it with clotrimazole drops that she had leftover.  She was having good improvement with the drops.  Over the last 5 days she had significant right-sided pain and swelling.  She again treated with clotrimazole drops and finally had improvement in the pain yesterday.    She has been using water precautions.      She denies any rafal otorrhea at this time, however did have some crusting of the external auditory canal on the right recently.    She has chronic itchy ears.  No history of diabetes    No allergy testing has been completed    Audiogram completed 1/31/2022:  Tympanograms are Type B for both ears suggesting restricted eardrum mobility.  Thresholds are normal left sloping to moderate-severe and right mild to profound conductive loss.  Speech reception thresholds are in good agreement with pure tone average.  Word discrimination scores are excellent at supra-thresholds level.         Medications:     Current Outpatient Rx   Medication Sig Dispense Refill     clotrimazole (LOTRIMIN) 1 % external solution Apply 4 drops to the right ear twice daily x 10 days 10 mL 1     cyclobenzaprine  "(FLEXERIL) 5 MG tablet Take 1 tablet (5 mg) by mouth 3 times daily as needed for muscle spasms 30 tablet 1     Misc. Devices (BREAST PUMP) MISC Dispense 1 double electric breast pump per patient preference and insurance coverage. Length of need: 1 year 1 each 0     Sulfacetamide-prednisoLONE (VASOCIDIN OP)        sulfacetamide-prednisoLONE (VASOCIDIN) 10-0.23 % ophthalmic solution Place 4 drops into the right ear 2 times daily for 12 days 10 mL 0            Allergies:     Allergies: Amoxicillin-pot clavulanate          Past Medical History:     Past Medical History:   Diagnosis Date     Syncope and collapse     ,with abd pain, (-) workup            Past Surgical History:     Past Surgical History:   Procedure Laterality Date     COLONOSCOPY           LAPAROSCOPIC CHOLECYSTECTOMY      14,Cholecystectomy,Laparoscopic       ENT family history reviewed         Social History:     Social History     Tobacco Use     Smoking status: Former Smoker     Packs/day: 0.20     Types: Cigarettes     Quit date: 2013     Years since quittin.5     Smokeless tobacco: Never Used     Tobacco comment: Quit 3 years    Vaping Use     Vaping Use: Never used   Substance Use Topics     Alcohol use: Not Currently     Alcohol/week: 0.0 standard drinks     Comment: occasional, 3 per week      Drug use: Never            Review of Systems:     ROS: See HPI         Physical Exam:     /72 (BP Location: Left arm, Patient Position: Sitting, Cuff Size: Adult Large)   Pulse 81   Temp 97.5  F (36.4  C) (Tympanic)   Ht 1.676 m (5' 6\")   Wt 86.2 kg (190 lb)   SpO2 97%   BMI 30.67 kg/m      General - The patient is well nourished and well developed, and appears to have good nutritional status.  Alert and oriented to person and place, answers questions and cooperates with examination appropriately.   Head and Face - Normocephalic and atraumatic, with no gross asymmetry noted.  The facial nerve is intact, with strong " symmetric movements.  Voice and Breathing - The patient was breathing comfortably without the use of accessory muscles. There was no wheezing, stridor. The patients voice was clear and strong, and had appropriate pitch and quality.  Ears - the ears were examined under binocular microscopy and with otoscope.  She has bilateral narrow canals, #1 speculum does fit in canal.  The left canal appears normal, left tympanic membrane is intact without effusion or retraction.  The right canal has minimal edema and tenderness.  No mastoid tenderness or fluctuation.  Small amount of dried otorrhea deep in the canal.  This was debrided with #5 and #3 Sanchez.  Due to small canal and canal swelling I am only able to see about 50% of the posterior tympanic membrane.  No obvious effusions or retractions noted.  She did have improvement in symptoms after debridement.  Eyes - Extraocular movements intact, sclera were not icteric or injected.  Mouth - Examination of the oral cavity showed pink, healthy oral mucosa. Dentition in good condition. No lesions or ulcerations noted. The tongue was mobile and midline.   Throat - The walls of the oropharynx were smooth, pink, moist, symmetric, and had no lesions or ulcerations.  The tonsillar pillars and soft palate were symmetric. The uvula was midline on elevation.    Neck - no palpable lymphadenopathy.  Palpation of the thyroid was soft and smooth, with no nodules or goiter appreciated.  The trachea was mobile and midline.           Assessment and Plan:       ICD-10-CM    1. Infective otitis externa, right  H60.391      No cultures were obtained today as the majority of the otorrhea was dried.  We will treat her with Vasocidin drops to hopefully's help with the swelling and tenderness.  No evidence of sporangia.     Follow-up in 2 weeks for recheck  Keep the right ear dry    Angeles Ferguson NP-C  [facility]

## 2022-05-04 NOTE — LETTER
5/4/2022         RE: Pat Mccabe  [address]  [facility] [address]        Dear Colleague,    Thank you for referring your patient, Pat Mccabe, to the [facility]. Please see a copy of my visit note below.    Otolaryngology Note         Chief Complaint:     Patient presents with:  Otalgia           History of Present Illness:     Pat Mccabe is a 36 year old female seen today for right sided pain.  I last saw Pat on 2/28/2022 for follow-up of otitis externa.  She has a longstanding history of recurrent otitis externa.  In the past she had typically use drops with good improvement.  When I saw her she had thick fluid in bilateral canals cultures were obtained for both ears and grew out both Candida and Enterobacter.  She was initially treated with debridement, Vasocidin drops and miconazole powder with resolution in symptoms.  She was scheduled for follow-up audiogram, but this has not been completed yet.    Over the last 2-1/2 months she has had a couple episodes of right ear swelling and tenderness.  She has treated it with clotrimazole drops that she had leftover.  She was having good improvement with the drops.  Over the last 5 days she had significant right-sided pain and swelling.  She again treated with clotrimazole drops and finally had improvement in the pain yesterday.    She has been using water precautions.      She denies any rafal otorrhea at this time, however did have some crusting of the external auditory canal on the right recently.    She has chronic itchy ears.  No history of diabetes    No allergy testing has been completed    Audiogram completed 1/31/2022:  Tympanograms are Type B for both ears suggesting restricted eardrum mobility.  Thresholds are normal left sloping to moderate-severe and right mild to profound conductive loss.  Speech reception thresholds are in good agreement with pure tone average.  Word discrimination scores are  "excellent at supra-thresholds level.         Medications:     Current Outpatient Rx   Medication Sig Dispense Refill     clotrimazole (LOTRIMIN) 1 % external solution Apply 4 drops to the right ear twice daily x 10 days 10 mL 1     cyclobenzaprine (FLEXERIL) 5 MG tablet Take 1 tablet (5 mg) by mouth 3 times daily as needed for muscle spasms 30 tablet 1     Misc. Devices (BREAST PUMP) MISC Dispense 1 double electric breast pump per patient preference and insurance coverage. Length of need: 1 year 1 each 0     Sulfacetamide-prednisoLONE (VASOCIDIN OP)        sulfacetamide-prednisoLONE (VASOCIDIN) 10-0.23 % ophthalmic solution Place 4 drops into the right ear 2 times daily for 12 days 10 mL 0            Allergies:     Allergies: Amoxicillin-pot clavulanate          Past Medical History:     Past Medical History:   Diagnosis Date     Syncope and collapse     ,with abd pain, (-) workup            Past Surgical History:     Past Surgical History:   Procedure Laterality Date     COLONOSCOPY           LAPAROSCOPIC CHOLECYSTECTOMY      14,Cholecystectomy,Laparoscopic       ENT family history reviewed         Social History:     Social History     Tobacco Use     Smoking status: Former Smoker     Packs/day: 0.20     Types: Cigarettes     Quit date: 2013     Years since quittin.4     Smokeless tobacco: Never Used     Tobacco comment: Quit 3 years    Vaping Use     Vaping Use: Never used   Substance Use Topics     Alcohol use: Not Currently     Alcohol/week: 0.0 standard drinks     Comment: occasional, 3 per week      Drug use: Never            Review of Systems:     ROS: See HPI         Physical Exam:     /72 (BP Location: Left arm, Patient Position: Sitting, Cuff Size: Adult Large)   Pulse 81   Temp 97.5  F (36.4  C) (Tympanic)   Ht 1.676 m (5' 6\")   Wt 86.2 kg (190 lb)   SpO2 97%   BMI 30.67 kg/m      General - The patient is well nourished and well developed, and appears to have good " nutritional status.  Alert and oriented to person and place, answers questions and cooperates with examination appropriately.   Head and Face - Normocephalic and atraumatic, with no gross asymmetry noted.  The facial nerve is intact, with strong symmetric movements.  Voice and Breathing - The patient was breathing comfortably without the use of accessory muscles. There was no wheezing, stridor. The patients voice was clear and strong, and had appropriate pitch and quality.  Ears - the ears were examined under binocular microscopy and with otoscope.  She has bilateral narrow canals, #1 speculum does fit in canal.  The left canal appears normal, left tympanic membrane is intact without effusion or retraction.  The right canal has minimal edema and tenderness.  No mastoid tenderness or fluctuation.  Small amount of dried otorrhea deep in the canal.  This was debrided with #5 and #3 Sanchez.  Due to small canal and canal swelling I am only able to see about 50% of the posterior tympanic membrane.  No obvious effusions or retractions noted.  She did have improvement in symptoms after debridement.  Eyes - Extraocular movements intact, sclera were not icteric or injected.  Mouth - Examination of the oral cavity showed pink, healthy oral mucosa. Dentition in good condition. No lesions or ulcerations noted. The tongue was mobile and midline.   Throat - The walls of the oropharynx were smooth, pink, moist, symmetric, and had no lesions or ulcerations.  The tonsillar pillars and soft palate were symmetric. The uvula was midline on elevation.    Neck - no palpable lymphadenopathy.  Palpation of the thyroid was soft and smooth, with no nodules or goiter appreciated.  The trachea was mobile and midline.           Assessment and Plan:       ICD-10-CM    1. Other infective chronic otitis externa of left ear  H60.392 sulfacetamide-prednisoLONE (VASOCIDIN) 10-0.23 % ophthalmic solution     No cultures were obtained today as the  majority of the otorrhea was dried.  We will treat her with Vasocidin drops to hopefully's help with the swelling and tenderness.  No evidence of sporangia.     Follow-up in 2 weeks for recheck  Keep the right ear dry    Angeles HERRERA  [facility]        Again, thank you for allowing me to participate in the care of your patient.        Sincerely,        Angeles Ferguson NP

## 2022-05-16 ENCOUNTER — OFFICE VISIT (OUTPATIENT)
Dept: OTOLARYNGOLOGY | Facility: OTHER | Age: 37
End: 2022-05-16
Attending: NURSE PRACTITIONER
Payer: COMMERCIAL

## 2022-05-16 ENCOUNTER — MYC MEDICAL ADVICE (OUTPATIENT)
Dept: FAMILY MEDICINE | Facility: OTHER | Age: 37
End: 2022-05-16
Payer: COMMERCIAL

## 2022-05-16 VITALS
HEART RATE: 86 BPM | DIASTOLIC BLOOD PRESSURE: 64 MMHG | BODY MASS INDEX: 30.53 KG/M2 | TEMPERATURE: 96.8 F | OXYGEN SATURATION: 98 % | HEIGHT: 66 IN | WEIGHT: 190 LBS | SYSTOLIC BLOOD PRESSURE: 104 MMHG

## 2022-05-16 DIAGNOSIS — H60.391 INFECTIVE OTITIS EXTERNA, RIGHT: Primary | ICD-10-CM

## 2022-05-16 PROCEDURE — 99213 OFFICE O/P EST LOW 20 MIN: CPT | Mod: 25 | Performed by: NURSE PRACTITIONER

## 2022-05-16 PROCEDURE — 92504 EAR MICROSCOPY EXAMINATION: CPT | Performed by: NURSE PRACTITIONER

## 2022-05-16 ASSESSMENT — PAIN SCALES - GENERAL: PAINLEVEL: NO PAIN (0)

## 2022-05-16 NOTE — LETTER
2022         RE: Pat Mccabe  [address]  [facility] [address]        Dear Colleague,    Thank you for referring your patient, Pat Mccabe, to the [facility]. Please see a copy of my visit note below.    Otolaryngology Note         Chief Complaint:     Patient presents with:  Follow Up: Left Infective Chronic Otitis Externa.  Pt feels better but it feels like something is in there still.           History of Present Illness:     Pat Mccabe is a 36 year old female seen today for follow-up right otitis externa.  I last saw her on 2022 for right-sided ear pain.  Her right canal was debrided under binocular microscopy.  She was treated with Vasocidin drops.    Today she reports she has been doing well.  No ear pain over the past 2 weeks.  Has been keeping the ear dry.  She finished otic's as prescribed.  She feels that her hearing is back to normal.  New audiogram is pending.         Medications:     Current Outpatient Rx   Medication Sig Dispense Refill     Misc. Devices (BREAST PUMP) MISC Dispense 1 double electric breast pump per patient preference and insurance coverage. Length of need: 1 year 1 each 0     sulfacetamide-prednisoLONE (VASOCIDIN) 10-0.23 % ophthalmic solution Place 4 drops into the right ear 2 times daily for 12 days 10 mL 0            Allergies:     Allergies: Amoxicillin-pot clavulanate          Past Medical History:     Past Medical History:   Diagnosis Date     Syncope and collapse     ,with abd pain, (-) workup            Past Surgical History:     Past Surgical History:   Procedure Laterality Date     COLONOSCOPY           LAPAROSCOPIC CHOLECYSTECTOMY      14,Cholecystectomy,Laparoscopic       ENT family history reviewed         Social History:     Social History     Tobacco Use     Smoking status: Former Smoker     Packs/day: 0.20     Types: Cigarettes     Quit date: 2013     Years since quittin.5     Smokeless  "tobacco: Never Used     Tobacco comment: Quit 3 years    Vaping Use     Vaping Use: Never used   Substance Use Topics     Alcohol use: Not Currently     Alcohol/week: 0.0 standard drinks     Comment: occasional, 3 per week      Drug use: Never            Review of Systems:     ROS: See HPI         Physical Exam:     /64 (BP Location: Right arm, Cuff Size: Adult Regular)   Pulse 86   Temp 96.8  F (36  C) (Tympanic)   Ht 1.676 m (5' 6\")   Wt 86.2 kg (190 lb)   SpO2 98%   BMI 30.67 kg/m    General - The patient is well nourished and well developed, and appears to have good nutritional status.  Alert and oriented to person and place, answers questions and cooperates with examination appropriately.   Head and Face - Normocephalic and atraumatic, with no gross asymmetry noted.  The facial nerve is intact, with strong symmetric movements.  Voice and Breathing - The patient was breathing comfortably without the use of accessory muscles. There was no wheezing, stridor. The patients voice was clear and strong, and had appropriate pitch and quality.  Ears - the right ear was examined under binocular microscopy and with otoscope.  The right canal swelling has improved.  No rafal otorrhea.  Some dried skin and cerumen were debrided from the right canal.  The right TM appears intact no obvious effusions or retractions.  Miconazole powder applied to right canal.  The left ear was examined with otoscope only.  Left canal is clear.  Left TM is intact, no effusions or retractions noted.  Eyes - Extraocular movements intact, sclera were not icteric or injected.  No mastoid tenderness or fluctuation.  Neck - no palpable lymphadenopathy palpation of the thyroid was soft and smooth, with no nodules or goiter appreciated.  The trachea was mobile and midline.           Assessment and Plan:       ICD-10-CM    1. Infective otitis externa, right  H60.391      Follow up in 2-3 weeks for recheck  Sooner as needed with changes or " concerns  Keep right ear dry.     Angeles HERRERA  [facility]        Again, thank you for allowing me to participate in the care of your patient.        Sincerely,        Angeles Ferguson NP

## 2022-05-16 NOTE — NURSING NOTE
"Chief Complaint   Patient presents with     Follow Up     Left Infective Chronic Otitis Externa.  Pt feels better but it feels like something is in there still.       Initial /64 (BP Location: Right arm, Cuff Size: Adult Regular)   Pulse 86   Temp 96.8  F (36  C) (Tympanic)   Ht 1.676 m (5' 6\")   Wt 86.2 kg (190 lb)   SpO2 98%   BMI 30.67 kg/m   Estimated body mass index is 30.67 kg/m  as calculated from the following:    Height as of this encounter: 1.676 m (5' 6\").    Weight as of this encounter: 86.2 kg (190 lb).  Medication Reconciliation: complete  Jacquie Palma LPN    "

## 2022-05-16 NOTE — PROGRESS NOTES
Otolaryngology Note         Chief Complaint:     Patient presents with:  Follow Up: Left Infective Chronic Otitis Externa.  Pt feels better but it feels like something is in there still.           History of Present Illness:     Pat Mccabe is a 36 year old female seen today for follow-up right otitis externa.  I last saw her on 2022 for right-sided ear pain.  Her right canal was debrided under binocular microscopy.  She was treated with Vasocidin drops.    Today she reports she has been doing well.  No ear pain over the past 2 weeks.  Has been keeping the ear dry.  She finished otic's as prescribed.  She feels that her hearing is back to normal.  New audiogram is pending.         Medications:     Current Outpatient Rx   Medication Sig Dispense Refill     Misc. Devices (BREAST PUMP) MISC Dispense 1 double electric breast pump per patient preference and insurance coverage. Length of need: 1 year 1 each 0     sulfacetamide-prednisoLONE (VASOCIDIN) 10-0.23 % ophthalmic solution Place 4 drops into the right ear 2 times daily for 12 days 10 mL 0            Allergies:     Allergies: Amoxicillin-pot clavulanate          Past Medical History:     Past Medical History:   Diagnosis Date     Syncope and collapse     ,with abd pain, (-) workup            Past Surgical History:     Past Surgical History:   Procedure Laterality Date     COLONOSCOPY           LAPAROSCOPIC CHOLECYSTECTOMY      14,Cholecystectomy,Laparoscopic       ENT family history reviewed         Social History:     Social History     Tobacco Use     Smoking status: Former Smoker     Packs/day: 0.20     Types: Cigarettes     Quit date: 2013     Years since quittin.5     Smokeless tobacco: Never Used     Tobacco comment: Quit 3 years    Vaping Use     Vaping Use: Never used   Substance Use Topics     Alcohol use: Not Currently     Alcohol/week: 0.0 standard drinks     Comment: occasional, 3 per week      Drug use: Never           "  Review of Systems:     ROS: See HPI         Physical Exam:     /64 (BP Location: Right arm, Cuff Size: Adult Regular)   Pulse 86   Temp 96.8  F (36  C) (Tympanic)   Ht 1.676 m (5' 6\")   Wt 86.2 kg (190 lb)   SpO2 98%   BMI 30.67 kg/m    General - The patient is well nourished and well developed, and appears to have good nutritional status.  Alert and oriented to person and place, answers questions and cooperates with examination appropriately.   Head and Face - Normocephalic and atraumatic, with no gross asymmetry noted.  The facial nerve is intact, with strong symmetric movements.  Voice and Breathing - The patient was breathing comfortably without the use of accessory muscles. There was no wheezing, stridor. The patients voice was clear and strong, and had appropriate pitch and quality.  Ears - the right ear was examined under binocular microscopy and with otoscope.  The right canal swelling has improved.  No rafal otorrhea.  Some dried skin and cerumen were debrided from the right canal.  The right TM appears intact no obvious effusions or retractions.  Miconazole powder applied to right canal.  The left ear was examined with otoscope only.  Left canal is clear.  Left TM is intact, no effusions or retractions noted.  Eyes - Extraocular movements intact, sclera were not icteric or injected.  No mastoid tenderness or fluctuation.  Neck - no palpable lymphadenopathy palpation of the thyroid was soft and smooth, with no nodules or goiter appreciated.  The trachea was mobile and midline.           Assessment and Plan:       ICD-10-CM    1. Infective otitis externa, right  H60.391      Follow up in 2-3 weeks for recheck  Sooner as needed with changes or concerns  Keep right ear dry.     Angeles Ferguson NP-C  [facility]    "

## 2022-06-04 ENCOUNTER — HEALTH MAINTENANCE LETTER (OUTPATIENT)
Age: 37
End: 2022-06-04

## 2022-06-06 ENCOUNTER — OFFICE VISIT (OUTPATIENT)
Dept: OTOLARYNGOLOGY | Facility: OTHER | Age: 37
End: 2022-06-06
Attending: NURSE PRACTITIONER
Payer: COMMERCIAL

## 2022-06-06 VITALS
OXYGEN SATURATION: 99 % | SYSTOLIC BLOOD PRESSURE: 106 MMHG | HEART RATE: 71 BPM | WEIGHT: 190 LBS | DIASTOLIC BLOOD PRESSURE: 64 MMHG | BODY MASS INDEX: 30.53 KG/M2 | TEMPERATURE: 97.4 F | HEIGHT: 66 IN

## 2022-06-06 DIAGNOSIS — Z86.69 HISTORY OF OTITIS EXTERNA: Primary | ICD-10-CM

## 2022-06-06 PROCEDURE — 99212 OFFICE O/P EST SF 10 MIN: CPT | Mod: 25 | Performed by: NURSE PRACTITIONER

## 2022-06-06 PROCEDURE — 92504 EAR MICROSCOPY EXAMINATION: CPT | Performed by: NURSE PRACTITIONER

## 2022-06-06 ASSESSMENT — PAIN SCALES - GENERAL: PAINLEVEL: NO PAIN (0)

## 2022-06-06 NOTE — PROGRESS NOTES
Otolaryngology Note         Chief Complaint:     Patient presents with:  Ear Problem: Ear check.  Follow up Infective OE right           History of Present Illness:     Pat Mccabe is a 36 year old female who presents today for follow-up right infective otitis externa.  She reports she has been doing well.  She states that she actually has not been thinking about her ears at all which is a change for her.  She has not had any ear pain, drainage for 5 weeks now.  I last saw her on 2022.  The right ear was debrided of dried skin and cerumen debris.  No otorrhea or sporangia was noted on exam.    She has been keeping her ear dry.  Feels like her hearing is back to baseline.  We have discussed follow-up audiogram, at this time she declines, she reports she is paying out-of-pocket for all of her ENT visits.  Would like to avoid unnecessary cost of possible    No tinnitus, vertigo, facial numbness or weakness.      No otalgia, otorrhea.    no hx of COM or otologic surgeries.          Medications:     No current outpatient medications on file.            Allergies:     Allergies: Amoxicillin-pot clavulanate          Past Medical History:     Past Medical History:   Diagnosis Date     Syncope and collapse     ,with abd pain, (-) workup            Past Surgical History:     Past Surgical History:   Procedure Laterality Date     COLONOSCOPY           LAPAROSCOPIC CHOLECYSTECTOMY      14,Cholecystectomy,Laparoscopic       ENT family history reviewed         Social History:     Social History     Tobacco Use     Smoking status: Former Smoker     Packs/day: 0.20     Types: Cigarettes     Quit date: 2013     Years since quittin.5     Smokeless tobacco: Never Used     Tobacco comment: Quit 3 years    Vaping Use     Vaping Use: Never used   Substance Use Topics     Alcohol use: Not Currently     Alcohol/week: 0.0 standard drinks     Comment: occasional, 3 per week      Drug use: Never             "Review of Systems:     ROS: See HPI         Physical Exam:     /64 (BP Location: Right arm, Cuff Size: Adult Regular)   Pulse 71   Temp 97.4  F (36.3  C) (Tympanic)   Ht 1.676 m (5' 6\")   Wt 86.2 kg (190 lb)   SpO2 99%   BMI 30.67 kg/m      General - The patient is well nourished and well developed, and appears to have good nutritional status.  Alert and oriented to person and place, answers questions and cooperates with examination appropriately.   Head and Face - Normocephalic and atraumatic, with no gross asymmetry noted.  The facial nerve is intact, with strong symmetric movements.  Voice and Breathing - The patient was breathing comfortably without the use of accessory muscles. There was no wheezing, stridor. The patients voice was clear and strong, and had appropriate pitch and quality.  Ears - The ears were examined with binocular microscopy and with otoscope.  External ears normal. Right canal is patent, there is a small amount of cerumen next to the TM..  Left canal patent.  The right ear was cleaned with #5, #3 Sanchez.   Right tympanic membrane is intact without effusion, retraction or mass.  Left tympanic membrane is intact without effusion, retraction or mass.  Miconazole powder was applied to the right ear.  Eyes - Extraocular movements intact, sclera were not icteric or injected, conjunctiva were pink and moist.  Mouth - Examination of the oral cavity showed pink, healthy oral mucosa. Dentition in good condition. No lesions or ulcerations noted. The tongue was mobile and midline.   Throat - The walls of the oropharynx were smooth, pink, moist, symmetric, and had no lesions or ulcerations.  The tonsillar pillars and soft palate were symmetric. The uvula was midline on elevation.    Neck - Full range of motion on passive movement.  Palpation of the occipital, submental, submandibular, internal jugular chain, and supraclavicular nodes did not demonstrate any abnormal lymph nodes or masses.  " Palpation of the thyroid was soft and smooth, with no nodules or goiter appreciated.  The trachea was mobile and midline.  Nose - External contour is symmetric, no gross deflection or scars.  Nasal mucosa is pink and moist with no abnormal mucus.  The septum and turbinates were evaluated with nasal speculum, no polyps, masses, or purulence noted on examination.         Assessment and Plan:       ICD-10-CM    1. History of otitis externa  Z86.69      Follow up as needed   Call Bernice's number below if you have any increase in symptoms  Keep ears dry as much as possible    Angeles Ferguson NP-C  [facility]NT

## 2022-06-06 NOTE — PATIENT INSTRUCTIONS
Follow up as needed   Call Bernice's number below if you have any increase in symptoms  Keep ears dry as much as possible    Thank you for allowing Angeles HERRERA and our ENT team to participate in your care.  If your medications are too expensive, please call my nurse at the number listed below.  We can possibly change this medication.    If you have a scheduling or an appointment question please contact our Health Unit Coordinator at their direct line [phone]  ALL nursing questions or concerns can be directed to my Nurse Alejandra [phone].

## 2022-06-06 NOTE — LETTER
2022         RE: Pat Mccabe  [address]  [facility] [address]        Dear Colleague,    Thank you for referring your patient, Pat Mccabe, to the [facility]. Please see a copy of my visit note below.    Otolaryngology Note         Chief Complaint:     Patient presents with:  Ear Problem: Ear check.  Follow up Infective OE right           History of Present Illness:     Pat Mccabe is a 36 year old female who presents today for follow-up right infective otitis externa.  She reports she has been doing well.  She states that she actually has not been thinking about her ears at all which is a change for her.  She has not had any ear pain, drainage for 5 weeks now.  I last saw her on 2022.  The right ear was debrided of dried skin and cerumen debris.  No otorrhea or sporangia was noted on exam.    She has been keeping her ear dry.  Feels like her hearing is back to baseline.  We have discussed follow-up audiogram, at this time she declines, she reports she is paying out-of-pocket for all of her ENT visits.  Would like to avoid unnecessary cost of possible    No tinnitus, vertigo, facial numbness or weakness.      No otalgia, otorrhea.    no hx of COM or otologic surgeries.          Medications:     No current outpatient medications on file.            Allergies:     Allergies: Amoxicillin-pot clavulanate          Past Medical History:     Past Medical History:   Diagnosis Date     Syncope and collapse     ,with abd pain, (-) workup            Past Surgical History:     Past Surgical History:   Procedure Laterality Date     COLONOSCOPY           LAPAROSCOPIC CHOLECYSTECTOMY      14,Cholecystectomy,Laparoscopic       ENT family history reviewed         Social History:     Social History     Tobacco Use     Smoking status: Former Smoker     Packs/day: 0.20     Types: Cigarettes     Quit date: 2013     Years since quittin.5     Smokeless  "tobacco: Never Used     Tobacco comment: Quit 3 years    Vaping Use     Vaping Use: Never used   Substance Use Topics     Alcohol use: Not Currently     Alcohol/week: 0.0 standard drinks     Comment: occasional, 3 per week      Drug use: Never            Review of Systems:     ROS: See HPI         Physical Exam:     /64 (BP Location: Right arm, Cuff Size: Adult Regular)   Pulse 71   Temp 97.4  F (36.3  C) (Tympanic)   Ht 1.676 m (5' 6\")   Wt 86.2 kg (190 lb)   SpO2 99%   BMI 30.67 kg/m      General - The patient is well nourished and well developed, and appears to have good nutritional status.  Alert and oriented to person and place, answers questions and cooperates with examination appropriately.   Head and Face - Normocephalic and atraumatic, with no gross asymmetry noted.  The facial nerve is intact, with strong symmetric movements.  Voice and Breathing - The patient was breathing comfortably without the use of accessory muscles. There was no wheezing, stridor. The patients voice was clear and strong, and had appropriate pitch and quality.  Ears - The ears were examined with binocular microscopy and with otoscope.  External ears normal. Right canal is patent, there is a small amount of cerumen next to the TM..  Left canal patent.  The right ear was cleaned with #5, #3 Sanchez.   Right tympanic membrane is intact without effusion, retraction or mass.  Left tympanic membrane is intact without effusion, retraction or mass.  Miconazole powder was applied to the right ear.  Eyes - Extraocular movements intact, sclera were not icteric or injected, conjunctiva were pink and moist.  Mouth - Examination of the oral cavity showed pink, healthy oral mucosa. Dentition in good condition. No lesions or ulcerations noted. The tongue was mobile and midline.   Throat - The walls of the oropharynx were smooth, pink, moist, symmetric, and had no lesions or ulcerations.  The tonsillar pillars and soft palate were " symmetric. The uvula was midline on elevation.    Neck - Full range of motion on passive movement.  Palpation of the occipital, submental, submandibular, internal jugular chain, and supraclavicular nodes did not demonstrate any abnormal lymph nodes or masses.  Palpation of the thyroid was soft and smooth, with no nodules or goiter appreciated.  The trachea was mobile and midline.  Nose - External contour is symmetric, no gross deflection or scars.  Nasal mucosa is pink and moist with no abnormal mucus.  The septum and turbinates were evaluated with nasal speculum, no polyps, masses, or purulence noted on examination.         Assessment and Plan:       ICD-10-CM    1. History of otitis externa  Z86.69      Follow up as needed   Call Bernice's number below if you have any increase in symptoms  Keep ears dry as much as possible    Angeles HERRERA  [facility] ENT        Again, thank you for allowing me to participate in the care of your patient.        Sincerely,        Angeles Ferguson NP

## 2022-08-17 ENCOUNTER — MYC MEDICAL ADVICE (OUTPATIENT)
Dept: FAMILY MEDICINE | Facility: OTHER | Age: 37
End: 2022-08-17

## 2022-08-17 DIAGNOSIS — M62.830 BACK MUSCLE SPASM: Primary | ICD-10-CM

## 2022-08-17 DIAGNOSIS — M54.50 CHRONIC LOW BACK PAIN, UNSPECIFIED BACK PAIN LATERALITY, UNSPECIFIED WHETHER SCIATICA PRESENT: ICD-10-CM

## 2022-08-17 DIAGNOSIS — G89.29 CHRONIC LOW BACK PAIN, UNSPECIFIED BACK PAIN LATERALITY, UNSPECIFIED WHETHER SCIATICA PRESENT: ICD-10-CM

## 2022-08-19 ENCOUNTER — HOSPITAL ENCOUNTER (OUTPATIENT)
Dept: GENERAL RADIOLOGY | Facility: OTHER | Age: 37
Discharge: HOME | End: 2022-08-19
Attending: CHIROPRACTOR | Admitting: FAMILY MEDICINE
Payer: COMMERCIAL

## 2022-08-19 DIAGNOSIS — M54.50 LOW BACK PAIN RADIATING TO BOTH LEGS: ICD-10-CM

## 2022-08-19 DIAGNOSIS — M79.604 LOW BACK PAIN RADIATING TO BOTH LEGS: ICD-10-CM

## 2022-08-19 DIAGNOSIS — M79.605 LOW BACK PAIN RADIATING TO BOTH LEGS: ICD-10-CM

## 2022-08-19 PROCEDURE — 72100 X-RAY EXAM L-S SPINE 2/3 VWS: CPT

## 2022-10-10 ENCOUNTER — HEALTH MAINTENANCE LETTER (OUTPATIENT)
Age: 37
End: 2022-10-10

## 2023-01-17 ENCOUNTER — MYC MEDICAL ADVICE (OUTPATIENT)
Dept: FAMILY MEDICINE | Facility: OTHER | Age: 38
End: 2023-01-17
Payer: COMMERCIAL

## 2023-01-18 NOTE — TELEPHONE ENCOUNTER
I would continue to monitor; could be ovary, but in light of having your period - would be more concerning if recurrent with 2-3 periods.  If recurs next month, would consider imaging of the area at that time.  Renetta Fletcher, DO

## 2023-03-23 ENCOUNTER — MYC MEDICAL ADVICE (OUTPATIENT)
Dept: FAMILY MEDICINE | Facility: OTHER | Age: 38
End: 2023-03-23
Payer: COMMERCIAL

## 2023-03-23 DIAGNOSIS — L70.0 CYSTIC ACNE VULGARIS: Primary | ICD-10-CM

## 2023-03-23 RX ORDER — CLINDAMYCIN PHOSPHATE 10 MG/G
GEL TOPICAL DAILY
Qty: 60 G | Refills: 5 | Status: SHIPPED | OUTPATIENT
Start: 2023-03-23

## 2023-04-09 ENCOUNTER — MYC MEDICAL ADVICE (OUTPATIENT)
Dept: FAMILY MEDICINE | Facility: OTHER | Age: 38
End: 2023-04-09
Payer: COMMERCIAL

## 2023-06-10 ENCOUNTER — HEALTH MAINTENANCE LETTER (OUTPATIENT)
Age: 38
End: 2023-06-10

## 2023-09-27 NOTE — NURSING NOTE
"Chief Complaint   Patient presents with     Otalgia       Initial /72 (BP Location: Left arm, Patient Position: Sitting, Cuff Size: Adult Large)   Pulse 81   Temp 97.5  F (36.4  C) (Tympanic)   Ht 1.676 m (5' 6\")   Wt 86.2 kg (190 lb)   SpO2 97%   BMI 30.67 kg/m   Estimated body mass index is 30.67 kg/m  as calculated from the following:    Height as of this encounter: 1.676 m (5' 6\").    Weight as of this encounter: 86.2 kg (190 lb).  Medication Reconciliation: complete  Alejandra Ramirez LPN    " [address] [facility], [facility]                                OPERATIVE REPORT    PATIENT NAME: Adri Nobles                    :        1953  MED REC NO:   [id number]                             ROOM:  ACCOUNT NO:   [de-identified]                           ADMIT DATE: 2023  PROVIDER:     Nancy Ruiz    DATE OF PROCEDURE:  2023    PREOPERATIVE DIAGNOSIS:  Suspicious lesions of left shoulder and right  arm. POSTOPERATIVE DIAGNOSIS:  Suspicious lesions of left shoulder and right  arm. OPERATION PERFORMED:  1. Excision of lesion of left shoulder (0.6 cm), taken with a 3-mm  margin and with intermediate repair of 2.0 cm length. 2.  Excision of lesion of right arm (0.8 cm), taken with a 3-mm margin  and with intermediate repair of 2.5 cm length. ATTENDING SURGEON:  Nancy Ruiz MD    ANESTHESIA:  Local 1% Xylocaine with epinephrine, buffered. INDICATIONS:  The patient is a 57-year-old female who presents with two  new suspicious lesions as listed above. The procedure, the risks, and  the benefits were discussed with her. All questions answered to her  satisfaction. Consent was signed. OPERATIVE SUMMARY:  The patient was brought to operative suite, placed  in the supine position. She was prepped and draped in the usual sterile  fashion. Each area was draped out sterilely. Both lesions were treated  similarly. Initially using a marker, outline was made around each  lesion inclusive of margin. Local anesthetic was infiltrated in each  area. Then with a scalpel, each lesion was excised through the  full-thickness of the skin and taken with underlying subcutaneous  tissues. They were sent off individually as specimens. Bovie cautery  was used for hemostasis.   Wounds were closed in layers with interrupted  4-0 Vicryl in the subcutaneous and running intracuticular 4-0 Prolene in  the

## 2023-12-15 NOTE — PATIENT INSTRUCTIONS
Follow up in 2-3 weeks for recheck  Sooner as needed with changes or concerns  Keep right ear dry.       Thank you for allowing Angeles HERRERA and our ENT team to participate in your care.  If your medications are too expensive, please call my nurse at the number listed below.  We can possibly change this medication.    If you have a scheduling or an appointment question please contact our Health Unit Coordinator at their direct line [phone] ext [phone]  ALL nursing questions or concerns can be directed to my Nurse Alejandra [phone].      07:30

## 2024-02-09 NOTE — TELEPHONE ENCOUNTER
"S-(situation): called and spoke with patient and patient states that she has been having lower right abdomen pain.    B-(background): patient states got her period on Saturday    A-(assessment): patient states yesterday the pain was really bad rates it a 7/10. Today rates pain a 3/10.  States its like a constant \"tooth ache\" in her side.  Patient wondering if it is her ovary .  Patient denies any other symptoms with it.  Informed patient Dr. Fletcher has a same day appointment today.  States today she is working all day and feels she better that she should be fine.    Patient would like Dr. Fletcher to review and advise.  Patient states that her children have appointments on Friday with Dr. Fletcher and she was going to maybe talk to her about it then.    R-(recommendations): informed patient will route message to Dr. Fletcher for review and consideration.  " Caller: Patient    Doctor/Office: Jessica    Call regarding :  Returning Nurse Elyse call.     Call Back # [phone]   Subjective:       Patient ID: Sandro Alberto is a 3 y.o. male.    Chief Complaint: Follow-up    HPI   Recent T+A.  No intra or post operative airway/lung complications.  No snoring.  Rare cough.  Occasionally SOB with exertion.  Mom concerned of chest deformity.    Review of Systems   Constitutional: Negative for activity change, appetite change and fever.   HENT: Negative for rhinorrhea.    Eyes: Negative for discharge.   Respiratory: Negative for apnea, cough, choking, wheezing and stridor.    Cardiovascular: Negative for leg swelling.   Gastrointestinal: Negative for diarrhea and vomiting.   Genitourinary: Negative for decreased urine volume.   Musculoskeletal: Negative for joint swelling.   Skin: Negative for rash.   Neurological: Negative for tremors and seizures.   Hematological: Does not bruise/bleed easily.   Psychiatric/Behavioral: Negative for sleep disturbance.       Objective:      Physical Exam   Constitutional: He appears well-developed and well-nourished. No distress.   HENT:   Nose: No nasal discharge.   Mouth/Throat: Mucous membranes are moist. Oropharynx is clear.   Eyes: Conjunctivae and EOM are normal. Pupils are equal, round, and reactive to light.   Neck: Normal range of motion.   Cardiovascular: Regular rhythm, S1 normal and S2 normal.    Pulmonary/Chest: Effort normal and breath sounds normal. He has no wheezes. He exhibits deformity (pectus carinatum).   Abdominal: Soft.   Musculoskeletal: Normal range of motion.   Neurological: He is alert.   Skin: Skin is warm. No rash noted.   Nursing note and vitals reviewed.      Interim notes reviewed  Assessment:       1. Chronic lung disease of prematurity    2. Pectus carinatum        Overall doing well  Discussed pectus- will monitor for now  Plan:    Overall doing well   Monitor

## 2024-03-15 ENCOUNTER — VIRTUAL VISIT (OUTPATIENT)
Dept: OBGYN | Facility: OTHER | Age: 39
End: 2024-03-15
Attending: OBSTETRICS & GYNECOLOGY
Payer: COMMERCIAL

## 2024-03-15 VITALS — WEIGHT: 195 LBS | HEIGHT: 66 IN | BODY MASS INDEX: 31.34 KG/M2

## 2024-03-15 DIAGNOSIS — Z32.01 PREGNANCY TEST POSITIVE: Primary | ICD-10-CM

## 2024-03-15 PROCEDURE — 99207 PR OB VISIT-NO CHARGE - GICH ONLY: CPT | Mod: 93

## 2024-03-15 ASSESSMENT — ANXIETY QUESTIONNAIRES
2. NOT BEING ABLE TO STOP OR CONTROL WORRYING: NOT AT ALL
5. BEING SO RESTLESS THAT IT IS HARD TO SIT STILL: NOT AT ALL
IF YOU CHECKED OFF ANY PROBLEMS ON THIS QUESTIONNAIRE, HOW DIFFICULT HAVE THESE PROBLEMS MADE IT FOR YOU TO DO YOUR WORK, TAKE CARE OF THINGS AT HOME, OR GET ALONG WITH OTHER PEOPLE: NOT DIFFICULT AT ALL
1. FEELING NERVOUS, ANXIOUS, OR ON EDGE: NOT AT ALL
GAD7 TOTAL SCORE: 1
7. FEELING AFRAID AS IF SOMETHING AWFUL MIGHT HAPPEN: NOT AT ALL
3. WORRYING TOO MUCH ABOUT DIFFERENT THINGS: NOT AT ALL
6. BECOMING EASILY ANNOYED OR IRRITABLE: SEVERAL DAYS
GAD7 TOTAL SCORE: 1

## 2024-03-15 ASSESSMENT — PATIENT HEALTH QUESTIONNAIRE - PHQ9
SUM OF ALL RESPONSES TO PHQ QUESTIONS 1-9: 6
5. POOR APPETITE OR OVEREATING: NOT AT ALL

## 2024-03-15 ASSESSMENT — PAIN SCALES - GENERAL: PAINLEVEL: NO PAIN (0)

## 2024-03-15 NOTE — PROGRESS NOTES
Verbal consent obtained for telephone visit. Total length of call: 15 min    HPI:    This is a 38 year old female patient,  who was called today for OB Intake visit. Patient reports positive pregnancy test at home.     Obstetrical history and OB Demographics updated to the best of this nurse's ability based on patient report. PHQ-9 depression screening and routine Domestic Abuse screening completed. All immediate questions and concerns answered.    FOOD SECURITY SCREENING QUESTIONS:    The next two questions are to help us understand your food security.  If you are feeling you need any assistance in this area, we have resources available to support you today.    Hunger Vital Signs:  Within the past 12 months we worried whether our food would run out before we got money to buy more. Never  Within the past 12 months the food we bought just didn't last and we didn't have money to get more. Never    Last menstrual period is reported as Patient's last menstrual period was 2024 (approximate). KRISTINA based on LMP is Estimated Date of Delivery: 2024.  Her cycles are regular.  Her last menstrual period was normal.   Since her LMP, she has experienced  no complaints.       OBSTETRIC HISTORY:    OB History    Para Term  AB Living   6 3 1 2 2 3   SAB IAB Ectopic Multiple Live Births   2 0 0 0 3      # Outcome Date GA Lbr Tushar/2nd Weight Sex Delivery Anes PTL Lv   6 Current            5 Term 10/11/21 37w5d 03:00 / 00:06 3.731 kg (8 lb 3.6 oz) M Vag-Spont None N RODRIGO      Name: RAZA,MALE-CARLITO      Apgar1: 8  Apgar5: 9   4  11/02/15 36w5d  3.595 kg (7 lb 14.8 oz) M   N RODRIGO      Apgar1: 9  Apgar5: 10   3 SAB 02/01/15        FD   2 SAB 09/15/14        FD   1  13 35w0d 10:00 / 00:03 3.374 kg (7 lb 7 oz) F    RODRIGO      Birth Comments: System Generated. Please review and update pregnancy details.      Name: Jacquie Mccabe       Age of first pregnancy: 28  Previous OB  Provider: Mikayla Shen MD  Previous Delivering Clinic: [facility]  Release of Records: none    Current delivery plan: GI  Preferred OB Provider: Mikayla Shen MD  Current Primary Care Provider: Renetta Fletcher MD  Pediatrician: Renetta Fletcher MD    Additional History: hx of miscarriage, hx  labor, hx precipitous labor    Have you travelled during the pregnancy?No  Have your sexual partner(s) travelled during the pregnancy?No      HISTORY:   Planned Pregnancy: No but welcomed  Marital Status:   Occupation: dental hygienist  Living in Household: Spouse and Children    Father of the baby is involved.   Family and father of baby are supportive of current pregnancy.  Past Medical History of Father of Baby:Seizures, family history of diabetes    Past History:  Her past medical history   Past Medical History:   Diagnosis Date    Syncope and collapse     ,with abd pain, (-) workup   .      Her past surgical history:   Past Surgical History:   Procedure Laterality Date    COLONOSCOPY          LAPAROSCOPIC CHOLECYSTECTOMY      14,Cholecystectomy,Laparoscopic       She has a history of  pre-term delivery at 35 weeks, hx precipitous labor    Since her last LMP she denies use of alcohol, tobacco and street drugs.    Pap smear history: Last 3 Pap and HPV Results:       Latest Ref Rng & Units 3/12/2021     9:44 AM   PAP / HPV   PAP (Historical)  NIL    HPV 16 DNA NEG^Negative Negative    HPV 18 DNA NEG^Negative Negative    Other HR HPV NEG^Negative Negative        STD/STI history: No STD history    STD/STI symptoms: no noticeable symptoms     Past medical, surgical, social and family history were reviewed and updated in EPIC.    Medications reviewed by this nurse. Current medication list:  Current Outpatient Medications   Medication Sig Dispense Refill    Prenatal Vit-Fe Fumarate-FA (PRENATAL VITAMIN AND MINERAL PO)       clindamycin (CLINDAMAX) 1 % external gel Apply topically daily (Patient  not taking: Reported on 3/15/2024) 60 g 5     The following medications were recommended to be discontinued due to Pregnancy Category D status: denies  Patient informed to contact her primary care provider as soon as possible to discuss a safer alternative.    Risk factors:  Moderate and moderately severe risks (consult with OB/Gyn)  Previous fetal or  demise: No  History of  delivery: No  History of heart disease Class I: No  Severe anemia, unresponsive to iron therapy: No  Pelvic mass or neoplasm: No  Previous : No  Hyper/hypothyroidism: No  History of postpartum hemorrhage requiring transfusion:No  History of Placenta Accreta: No    High Risk (Pregnancy managed by OB/Gyn)  Multiple pregnancy: No  Pre-gestational diabetes: No  Chronic Hypertension: No  Renal Failure: No  Heart disease, class II or greater: No  Rh Isoimmunization: No  Chronic active hepatitis: No  Convulsive disorder, poorly controlled: No  Isoimmune thrombocytopenia: No  Pre-term premature rupture of membranes: No  Lupus or other autoimmune disorder: No  Human Immunodeficiency Virus: No      ASSESSMENT/PLAN:       ICD-10-CM    1. Pregnancy test positive  Z32.01           38 year old , 6w0d of pregnancy with KRISTINA of 2024, by Last Menstrual Period    Per standing orders and scope of practice of this nurse, patient will have the following orders placed and completed prior to initial OB visit with the appropriate provider:    --early ultrasound for dating and viability ordered for 6+ weeks gestation based on LMP    --Quantitative Beta HCG and progesterone monitoring if indicated    Counseling given:     - Recommended weight gain for pregnancy: < 15 lbs.   BMI < 18.5  28-40 lbs   18.5 - 24.9 25-35   25 - 29.9 15-25   > 30  < 15       PLAN/PATIENT INSTRUCTIONS:    Normal exercise.  Normal sexual activity.  Prenatal vitamins.  Anticipated weight gain.    follow-up appointment with Dr. Shen for pre- care and take  multivitamin or pre-manjeet vitamins    Neelima Bruno RN.................................................. 3/15/2024 8:59 AM

## 2024-03-27 LAB
ABO/RH(D): NORMAL
ANTIBODY SCREEN: NEGATIVE
SPECIMEN EXPIRATION DATE: NORMAL

## 2024-03-28 ENCOUNTER — PRENATAL OFFICE VISIT (OUTPATIENT)
Dept: OBGYN | Facility: OTHER | Age: 39
End: 2024-03-28
Payer: COMMERCIAL

## 2024-03-28 ENCOUNTER — HOSPITAL ENCOUNTER (OUTPATIENT)
Dept: ULTRASOUND IMAGING | Facility: OTHER | Age: 39
Discharge: HOME | End: 2024-03-28
Attending: OBSTETRICS & GYNECOLOGY
Payer: COMMERCIAL

## 2024-03-28 VITALS
HEART RATE: 72 BPM | WEIGHT: 200 LBS | DIASTOLIC BLOOD PRESSURE: 72 MMHG | BODY MASS INDEX: 32.14 KG/M2 | SYSTOLIC BLOOD PRESSURE: 110 MMHG | HEIGHT: 66 IN

## 2024-03-28 DIAGNOSIS — O21.9 NAUSEA AND VOMITING IN PREGNANCY: ICD-10-CM

## 2024-03-28 DIAGNOSIS — Z32.01 PREGNANCY TEST POSITIVE: ICD-10-CM

## 2024-03-28 DIAGNOSIS — O09.521 ENCOUNTER FOR SUPERVISION OF MULTIGRAVIDA OF ADVANCED MATERNAL AGE IN FIRST TRIMESTER: Primary | ICD-10-CM

## 2024-03-28 DIAGNOSIS — O09.529 ANTEPARTUM MULTIGRAVIDA OF ADVANCED MATERNAL AGE: ICD-10-CM

## 2024-03-28 LAB
ALBUMIN MFR UR ELPH: <6 MG/DL
ALBUMIN SERPL BCG-MCNC: 4.5 G/DL (ref 3.5–5.2)
ALBUMIN UR-MCNC: NEGATIVE MG/DL
ALP SERPL-CCNC: 80 U/L (ref 40–150)
ALT SERPL W P-5'-P-CCNC: 116 U/L (ref 0–50)
ANION GAP SERPL CALCULATED.3IONS-SCNC: 11 MMOL/L (ref 7–15)
APPEARANCE UR: CLEAR
AST SERPL W P-5'-P-CCNC: 39 U/L (ref 0–45)
BASOPHILS # BLD AUTO: 0 10E3/UL (ref 0–0.2)
BASOPHILS NFR BLD AUTO: 0 %
BILIRUB SERPL-MCNC: 0.5 MG/DL
BILIRUB UR QL STRIP: NEGATIVE
BUN SERPL-MCNC: 8.1 MG/DL (ref 6–20)
C TRACH DNA SPEC QL PROBE+SIG AMP: NEGATIVE
CALCIUM SERPL-MCNC: 9.8 MG/DL (ref 8.6–10)
CHLORIDE SERPL-SCNC: 102 MMOL/L (ref 98–107)
COLOR UR AUTO: YELLOW
CREAT SERPL-MCNC: 0.58 MG/DL (ref 0.51–0.95)
CREAT UR-MCNC: 28.3 MG/DL
DEPRECATED HCO3 PLAS-SCNC: 25 MMOL/L (ref 22–29)
EGFRCR SERPLBLD CKD-EPI 2021: >90 ML/MIN/1.73M2
EOSINOPHIL # BLD AUTO: 0 10E3/UL (ref 0–0.7)
EOSINOPHIL NFR BLD AUTO: 0 %
ERYTHROCYTE [DISTWIDTH] IN BLOOD BY AUTOMATED COUNT: 12.4 % (ref 10–15)
GLUCOSE SERPL-MCNC: 90 MG/DL (ref 70–99)
GLUCOSE UR STRIP-MCNC: NEGATIVE MG/DL
HCT VFR BLD AUTO: 40.2 % (ref 35–47)
HGB BLD-MCNC: 13.6 G/DL (ref 11.7–15.7)
HGB UR QL STRIP: NEGATIVE
IMM GRANULOCYTES # BLD: 0 10E3/UL
IMM GRANULOCYTES NFR BLD: 0 %
KETONES UR STRIP-MCNC: NEGATIVE MG/DL
LEUKOCYTE ESTERASE UR QL STRIP: NEGATIVE
LYMPHOCYTES # BLD AUTO: 2.9 10E3/UL (ref 0.8–5.3)
LYMPHOCYTES NFR BLD AUTO: 24 %
MCH RBC QN AUTO: 29.8 PG (ref 26.5–33)
MCHC RBC AUTO-ENTMCNC: 33.8 G/DL (ref 31.5–36.5)
MCV RBC AUTO: 88 FL (ref 78–100)
MONOCYTES # BLD AUTO: 0.5 10E3/UL (ref 0–1.3)
MONOCYTES NFR BLD AUTO: 4 %
MUCOUS THREADS #/AREA URNS LPF: PRESENT /LPF
N GONORRHOEA DNA SPEC QL NAA+PROBE: NEGATIVE
NEUTROPHILS # BLD AUTO: 8.5 10E3/UL (ref 1.6–8.3)
NEUTROPHILS NFR BLD AUTO: 71 %
NITRATE UR QL: NEGATIVE
NRBC # BLD AUTO: 0 10E3/UL
NRBC BLD AUTO-RTO: 0 /100
PH UR STRIP: 6.5 [PH] (ref 5–9)
PLATELET # BLD AUTO: 250 10E3/UL (ref 150–450)
POTASSIUM SERPL-SCNC: 3.5 MMOL/L (ref 3.4–5.3)
PROT SERPL-MCNC: 7.4 G/DL (ref 6.4–8.3)
PROT/CREAT 24H UR: NORMAL MG/G{CREAT}
RBC # BLD AUTO: 4.56 10E6/UL (ref 3.8–5.2)
RBC URINE: 0 /HPF
SODIUM SERPL-SCNC: 138 MMOL/L (ref 135–145)
SP GR UR STRIP: 1.01 (ref 1–1.03)
UROBILINOGEN UR STRIP-MCNC: NORMAL MG/DL
WBC # BLD AUTO: 12 10E3/UL (ref 4–11)
WBC URINE: <1 /HPF

## 2024-03-28 PROCEDURE — 86762 RUBELLA ANTIBODY: CPT | Mod: ZL

## 2024-03-28 PROCEDURE — 99207 PR OB VISIT-NO CHARGE - GICH ONLY: CPT

## 2024-03-28 PROCEDURE — 87389 HIV-1 AG W/HIV-1&-2 AB AG IA: CPT | Mod: ZL

## 2024-03-28 PROCEDURE — 80053 COMPREHEN METABOLIC PANEL: CPT | Mod: ZL

## 2024-03-28 PROCEDURE — 81001 URINALYSIS AUTO W/SCOPE: CPT | Mod: ZL

## 2024-03-28 PROCEDURE — 86780 TREPONEMA PALLIDUM: CPT | Mod: ZL

## 2024-03-28 PROCEDURE — 87491 CHLMYD TRACH DNA AMP PROBE: CPT | Mod: ZL

## 2024-03-28 PROCEDURE — 84156 ASSAY OF PROTEIN URINE: CPT | Mod: ZL

## 2024-03-28 PROCEDURE — 86803 HEPATITIS C AB TEST: CPT | Mod: ZL

## 2024-03-28 PROCEDURE — 76801 OB US < 14 WKS SINGLE FETUS: CPT

## 2024-03-28 PROCEDURE — 36415 COLL VENOUS BLD VENIPUNCTURE: CPT | Mod: ZL

## 2024-03-28 PROCEDURE — 87086 URINE CULTURE/COLONY COUNT: CPT | Mod: ZL

## 2024-03-28 PROCEDURE — 85049 AUTOMATED PLATELET COUNT: CPT | Mod: ZL

## 2024-03-28 PROCEDURE — 87340 HEPATITIS B SURFACE AG IA: CPT | Mod: ZL

## 2024-03-28 PROCEDURE — 86900 BLOOD TYPING SEROLOGIC ABO: CPT

## 2024-03-28 RX ORDER — METOCLOPRAMIDE 5 MG/1
10 TABLET ORAL 3 TIMES DAILY PRN
Qty: 60 TABLET | Refills: 1 | Status: SHIPPED | OUTPATIENT
Start: 2024-03-28

## 2024-03-28 RX ORDER — ASPIRIN 81 MG/1
81 TABLET, CHEWABLE ORAL DAILY
Status: SHIPPED
Start: 2024-03-28

## 2024-03-28 NOTE — PROGRESS NOTES
New Obstetrics Visit    HPI: 38 year old  at 8w3d CW 8w3d US as Lmp was approximate here today for initial OB visit. Her LMP was 24. This was an unplanned pregnancy, but welcome. She has a hx of  and precipitous labors.     OBHx  OB History    Para Term  AB Living   6 3 1 2 2 3   SAB IAB Ectopic Multiple Live Births   2 0 0 0 3      # Outcome Date GA Lbr Tushar/2nd Weight Sex Delivery Anes PTL Lv   6 Current            5 Term 10/11/21 37w5d 03:00 / 00:06 3.731 kg (8 lb 3.6 oz) M Vag-Spont None N RODRIGO      Name: RAZA,MALE-CARLITO      Apgar1: 8  Apgar5: 9   4  11/02/15 36w5d  3.595 kg (7 lb 14.8 oz) M   N RODRIGO      Apgar1: 9  Apgar5: 10   3 SAB 02/01/15        FD   2 SAB 09/15/14        FD   1  13 35w0d 10:00 / 00:03 3.374 kg (7 lb 7 oz) F    RODRIGO      Birth Comments: System Generated. Please review and update pregnancy details.      Name: Jacquie Mccabe       GYN History  - Menses: Patient's last menstrual period was 2024 (approximate)..   - Pap Smears: Next due 3/12/36     Lab Results   Component Value Date    PAP NIL 2021     - Sexual Activity/Concerns: none  - Hx STIs/UTIs: nonee    PMHx:   Past Medical History:   Diagnosis Date    Syncope and collapse     ,with abd pain, (-) workup    Varicella       PSHx:   Past Surgical History:   Procedure Laterality Date    COLONOSCOPY          LAPAROSCOPIC CHOLECYSTECTOMY      14,Cholecystectomy,Laparoscopic      Meds:   Current Outpatient Medications   Medication    Prenatal Vit-Fe Fumarate-FA (PRENATAL VITAMIN AND MINERAL PO)    clindamycin (CLINDAMAX) 1 % external gel     No current facility-administered medications for this visit.     Allergies:     Allergies   Allergen Reactions    Amoxicillin-Pot Clavulanate GI Disturbance     Stomach cramps and diarrhea       SocHx:   Social History     Tobacco Use    Smoking status: Former     Packs/day: .2     Types: Cigarettes     Quit date: 2013      "Years since quitting: 10.3    Smokeless tobacco: Never    Tobacco comments:     Quit 3 years    Vaping Use    Vaping Use: Never used   Substance Use Topics    Alcohol use: Not Currently     Alcohol/week: 0.0 standard drinks of alcohol     Comment: occasional, 3 per week     Drug use: Never         FamHx:   Family History   Problem Relation Age of Onset    No Known Problems Mother     No Known Problems Father     Cardiovascular Paternal Grandfather     No Known Problems Brother     No Known Problems Sister         Uterine rupture        ROS: 10-Point ROS negative except as noted in HPI      Physical Exam  /72   Pulse 72   Ht 1.676 m (5' 6\")   Wt 90.7 kg (200 lb)   LMP 2024 (Approximate)   BMI 32.28 kg/m    Body mass index is 32.28 kg/m .  Gen: Well-appearing, NAD  HEENT: Normocephalic, atraumatic  Neck: Thyroid is not enlarged, no appreciable masses palpated. Non-tender  CV:  RRR, no m/r/g auscultated  Pulm: CTAB, no w/r/r auscultated  Abd: Soft, non-tender, non-distended  Ext: No LE edema, extremities warm and well perfused  Pelvic:  Normal appearing external female genitalia. No vaginal lesions. Small discharge. Cervix normal, no lesions. Uterus is small, mobile, non-tender. No adnexal tenderness or masses    Labs:      Assessment/Plan:  Ms. Pat Mccabe is a 38 year old  at 8w3d by US as Lmp was unknown, here for new OB visit. Pregnancy is complicated by AMA,  delivery and precipitous delivery. We discussed the below recommendations and add on options as well as included any increased risk based on patient history with the patients choices and subsequent results if any are reflected below.   1. AMA- plan Pre E baseline labs, ASA 81mg at 12 weeks, MFM, growth at 32 weeks, NST at 36 weeks  2. PNC: New OB Labs ord'd  3. Genetics: considering reviewed increased risks   4. Imaging: dating US at 8w3d  5. Immunizations: declined flu     Follow up in 4 weeks.      Candy Veras, " APRN CNP

## 2024-03-28 NOTE — NURSING NOTE
Chief Complaint   Patient presents with    Prenatal Care     OB PX 7w6d       Medication Reconciliation: complete    Patient presents to the clinic for OB PX 7w6d. Patient states that she is feeling nauseous and having a hard time eating.      Sylvia King LPN

## 2024-03-29 LAB
HBV SURFACE AG SERPL QL IA: NONREACTIVE
HCV AB SERPL QL IA: NONREACTIVE
HIV 1+2 AB+HIV1 P24 AG SERPL QL IA: NONREACTIVE
RUBV IGG SERPL QL IA: 5.04 INDEX
RUBV IGG SERPL QL IA: POSITIVE
T PALLIDUM AB SER QL: NONREACTIVE

## 2024-03-31 LAB — BACTERIA UR CULT: NO GROWTH

## 2024-04-18 ENCOUNTER — PRENATAL OFFICE VISIT (OUTPATIENT)
Dept: OBGYN | Facility: OTHER | Age: 39
End: 2024-04-18
Attending: NURSE PRACTITIONER
Payer: COMMERCIAL

## 2024-04-18 VITALS
DIASTOLIC BLOOD PRESSURE: 72 MMHG | BODY MASS INDEX: 31.8 KG/M2 | HEART RATE: 81 BPM | WEIGHT: 197 LBS | SYSTOLIC BLOOD PRESSURE: 122 MMHG

## 2024-04-18 DIAGNOSIS — Z34.91 ENCOUNTER FOR PREGNANCY RELATED EXAMINATION IN FIRST TRIMESTER: Primary | ICD-10-CM

## 2024-04-18 PROCEDURE — 99207 PR OB VISIT-NO CHARGE - GICH ONLY: CPT | Performed by: NURSE PRACTITIONER

## 2024-04-18 NOTE — NURSING NOTE
"Chief Complaint   Patient presents with    Prenatal Care     11 w 3d     Pt presents to clinic today for prenatal care 11w 3 d. Pt is not having nausea anymore.   Initial /72   Pulse 81   Wt 89.4 kg (197 lb)   LMP 02/02/2024 (Approximate)   BMI 31.80 kg/m   Estimated body mass index is 31.8 kg/m  as calculated from the following:    Height as of 3/28/24: 1.676 m (5' 6\").    Weight as of this encounter: 89.4 kg (197 lb).  Medication Reconciliation: complete      Manuela Agosto LPN    "

## 2024-04-18 NOTE — PROGRESS NOTES
Chief Complaint: Recheck OB visit at 11w3d    HPI: Pat Mccabe presents for a routine OB visit check today.  She is at 11w3d dating with US 8w3d.     She states she is feeling well and all of her pregnancy symptoms have resolved.  This is making her nervous as usually she experienced her food aversions and morning sickness through the 13th week with her previous children.  Her father has also started sharing news of pregnancy and she would just like to confirm heartbeat today as she is not feeling pregnant anymore.  Patient denies contractions, selam cramping, vaginal bleeding or leaking of fluid.  States she feels well.    O: /72   Pulse 81   Wt 89.4 kg (197 lb)   LMP 2024 (Approximate)   BMI 31.80 kg/m    Body mass index is 31.8 kg/m .  See OB flow sheet  EXAM:   General: Well appearing, NAD  Abdomen: Soft, nontender  FHT:160 bpm    Assessment/Plan:  Ms. Pat Mccabe is a 38 year old  at 8w3d by US as Lmp was unknown, here for new OB visit. Pregnancy is complicated by AMA,  delivery and precipitous delivery.   Reassurance provided to patient today after hearing fetal heart tones with Doppler.  FHT: 160 bpm.  Patient feels better and reassured.  She will follow-up with her scheduled OB appointment on 2024.     JAMES Anderson., R.N., APRN CNP  3:30 PM 2024

## 2024-05-02 ENCOUNTER — PRENATAL OFFICE VISIT (OUTPATIENT)
Dept: OBGYN | Facility: OTHER | Age: 39
End: 2024-05-02
Payer: COMMERCIAL

## 2024-05-02 ENCOUNTER — ORDERS ONLY (AUTO-RELEASED) (OUTPATIENT)
Dept: OBGYN | Facility: OTHER | Age: 39
End: 2024-05-02
Payer: COMMERCIAL

## 2024-05-02 VITALS
WEIGHT: 196.4 LBS | TEMPERATURE: 96.9 F | SYSTOLIC BLOOD PRESSURE: 120 MMHG | DIASTOLIC BLOOD PRESSURE: 68 MMHG | BODY MASS INDEX: 31.7 KG/M2

## 2024-05-02 DIAGNOSIS — Z34.92 ENCOUNTER FOR PREGNANCY RELATED EXAMINATION, SECOND TRIMESTER: Primary | ICD-10-CM

## 2024-05-02 DIAGNOSIS — R00.0 RACING HEART BEAT: ICD-10-CM

## 2024-05-02 DIAGNOSIS — O09.522 MULTIGRAVIDA OF ADVANCED MATERNAL AGE IN SECOND TRIMESTER: ICD-10-CM

## 2024-05-02 LAB
ALBUMIN SERPL BCG-MCNC: 4 G/DL (ref 3.5–5.2)
ALP SERPL-CCNC: 68 U/L (ref 40–150)
ALT SERPL W P-5'-P-CCNC: 22 U/L (ref 0–50)
ANION GAP SERPL CALCULATED.3IONS-SCNC: 12 MMOL/L (ref 7–15)
AST SERPL W P-5'-P-CCNC: 16 U/L (ref 0–45)
BILIRUB SERPL-MCNC: 0.5 MG/DL
BUN SERPL-MCNC: 7.7 MG/DL (ref 6–20)
CALCIUM SERPL-MCNC: 9 MG/DL (ref 8.6–10)
CHLORIDE SERPL-SCNC: 101 MMOL/L (ref 98–107)
CREAT SERPL-MCNC: 0.52 MG/DL (ref 0.51–0.95)
DEPRECATED HCO3 PLAS-SCNC: 23 MMOL/L (ref 22–29)
EGFRCR SERPLBLD CKD-EPI 2021: >90 ML/MIN/1.73M2
GLUCOSE SERPL-MCNC: 88 MG/DL (ref 70–99)
POTASSIUM SERPL-SCNC: 3.7 MMOL/L (ref 3.4–5.3)
PROT SERPL-MCNC: 7.1 G/DL (ref 6.4–8.3)
SODIUM SERPL-SCNC: 136 MMOL/L (ref 135–145)

## 2024-05-02 PROCEDURE — 99207 PR OB VISIT-NO CHARGE - GICH ONLY: CPT

## 2024-05-02 PROCEDURE — 84155 ASSAY OF PROTEIN SERUM: CPT | Mod: ZL

## 2024-05-02 PROCEDURE — 36415 COLL VENOUS BLD VENIPUNCTURE: CPT | Mod: ZL

## 2024-05-02 PROCEDURE — 82040 ASSAY OF SERUM ALBUMIN: CPT | Mod: ZL

## 2024-05-02 ASSESSMENT — PAIN SCALES - GENERAL: PAINLEVEL: NO PAIN (0)

## 2024-05-02 NOTE — PROGRESS NOTES
OB Visit    S: Patient is feeling well having very intermittent heart palpitations with some SOB with them- not occurring today. Denies LOF, VB, or regular Ctx. - FM.    Concerns: as above     O: /68   Temp 96.9  F (36.1  C) (Tympanic)   Wt 89.1 kg (196 lb 6.4 oz)   LMP 2024 (Approximate)   BMI 31.70 kg/m    Gen: Well-appearing, NAD  FHT: 162      A/P:  Pat Mccabe is a 38 year old  at 13w3d by 8 week US, here for return OB visit.  1. AMA- plan Pre E baseline labs, ASA 81mg at 12 weeks, MFM, growth at 32 weeks, NST at 36 weeks   2. Heart palpitations- zio patch and cmp today. Discussed increased flow in pregnancy can cause this. Return precautions given.   3. Elevated ALT on initial labs- repeat Cmp   4.: Hx of  birth: last birth term       PNC: We discussed the below recommendations for planning, testing, and add on options as well as detailed any increased risk based on patient history. The subsequent choices and results if any are reflected below.   Prenatal labs WNL, Rh , Rubella immune, GCT TBD, 3rd Trimester HGB TBD, GBS TBD   Rhogam: NA  Genetics: declines today   Imagin week US, MFM ordered   Immunizations: TDAP TBD, RSV TBD  Delivery Plan: TBD   BC after delivery: TBD     RTC 4 weeks       Candy GARZA, FNP-C  2024 11:08 AM

## 2024-05-02 NOTE — NURSING NOTE
"Chief Complaint   Patient presents with    Prenatal Care       Initial /68   Temp 96.9  F (36.1  C) (Tympanic)   Wt 89.1 kg (196 lb 6.4 oz)   LMP 02/02/2024 (Approximate)   BMI 31.70 kg/m   Estimated body mass index is 31.7 kg/m  as calculated from the following:    Height as of 3/28/24: 1.676 m (5' 6\").    Weight as of this encounter: 89.1 kg (196 lb 6.4 oz).  Medication Reconciliation: complete          "

## 2024-05-30 ENCOUNTER — PRENATAL OFFICE VISIT (OUTPATIENT)
Dept: OBGYN | Facility: OTHER | Age: 39
End: 2024-05-30
Payer: COMMERCIAL

## 2024-05-30 VITALS
HEART RATE: 72 BPM | WEIGHT: 193 LBS | BODY MASS INDEX: 31.15 KG/M2 | SYSTOLIC BLOOD PRESSURE: 110 MMHG | DIASTOLIC BLOOD PRESSURE: 60 MMHG

## 2024-05-30 DIAGNOSIS — O09.522 MULTIGRAVIDA OF ADVANCED MATERNAL AGE IN SECOND TRIMESTER: Primary | ICD-10-CM

## 2024-05-30 PROCEDURE — 99207 PR OB VISIT-NO CHARGE - GICH ONLY: CPT | Performed by: OBSTETRICS & GYNECOLOGY

## 2024-05-30 ASSESSMENT — PAIN SCALES - GENERAL: PAINLEVEL: NO PAIN (0)

## 2024-05-30 NOTE — PROGRESS NOTES
Return OB Visit    S: Patient is feeling well overall. No cramping or VB. No FM yet. Is wearing ZIO patch for palpitations, happening about 15-20 times/day. Patch comes off tomorrow.     O: /60 (BP Location: Right arm, Patient Position: Sitting, Cuff Size: Adult Regular)   Pulse 72   Wt 87.5 kg (193 lb)   LMP 2024 (Approximate)   BMI 31.15 kg/m    Gen: Well-appearing, NAD  See OB Flowsheet    A/P:  Pat Mccabe is a 38 year old  at 17w3d by 8w3d US, here for return OB visit.  Hx of spontaneous  delivery x2, last pregnancy was term (37w5d)  AMA  - on ASA 81mg  - needs weekly NST starting 36 weeks  - elevated ALT on initial HELLP labs, resolved on recheck. Otherwise normal baseline labs    Palpitations:  - wearing ZIO patch    PNC: Rh positive, Rubella immune  Genetics: declines  Imaging: dating US at 8w3d, level 2 US scheduled 24  Immunizations: none  RTC 4 weeks    Mikayla Shen MD FACOG  OB/GYN  2024 2:58 PM

## 2024-05-30 NOTE — NURSING NOTE
Chief Complaint   Patient presents with    Prenatal Care     17w3d       Medication Reconciliation: complete  Offers no concerns.     Peri Santacruz LPN........................5/30/2024  3:05 PM

## 2024-06-12 ENCOUNTER — HOSPITAL ENCOUNTER (OUTPATIENT)
Dept: ULTRASOUND IMAGING | Facility: OTHER | Age: 39
Discharge: HOME | End: 2024-06-12
Payer: COMMERCIAL

## 2024-06-12 ENCOUNTER — OFFICE VISIT (OUTPATIENT)
Dept: MATERNAL FETAL MEDICINE | Facility: CLINIC | Age: 39
End: 2024-06-12
Attending: OBSTETRICS & GYNECOLOGY
Payer: COMMERCIAL

## 2024-06-12 ENCOUNTER — HOSPITAL ENCOUNTER (OUTPATIENT)
Dept: ULTRASOUND IMAGING | Facility: CLINIC | Age: 39
Discharge: HOME | End: 2024-06-12
Attending: OBSTETRICS & GYNECOLOGY
Payer: COMMERCIAL

## 2024-06-12 DIAGNOSIS — O09.522 MULTIGRAVIDA OF ADVANCED MATERNAL AGE IN SECOND TRIMESTER: ICD-10-CM

## 2024-06-12 DIAGNOSIS — O09.899 HX OF PRETERM DELIVERY, CURRENTLY PREGNANT: Primary | ICD-10-CM

## 2024-06-12 DIAGNOSIS — O09.892 H/O PRETERM DELIVERY, CURRENTLY PREGNANT, SECOND TRIMESTER: ICD-10-CM

## 2024-06-12 DIAGNOSIS — O09.522 MULTIGRAVIDA OF ADVANCED MATERNAL AGE IN SECOND TRIMESTER: Primary | ICD-10-CM

## 2024-06-12 DIAGNOSIS — Z34.92 ENCOUNTER FOR PREGNANCY RELATED EXAMINATION, SECOND TRIMESTER: ICD-10-CM

## 2024-06-12 PROCEDURE — 76817 TRANSVAGINAL US OBSTETRIC: CPT | Mod: 26 | Performed by: OBSTETRICS & GYNECOLOGY

## 2024-06-12 PROCEDURE — 76811 OB US DETAILED SNGL FETUS: CPT | Mod: 26 | Performed by: OBSTETRICS & GYNECOLOGY

## 2024-06-12 NOTE — PROGRESS NOTES
Type of service:    Telemedicine Office Visit for Fetal Ultrasound    Date of service:     Date: 06/12/24     Time service began:    8:00 AM  Time service ended:    9:00 AM    Reason:      Lack of available service in patient area    Description of basis or telemedicine appropriateness:     Consultation provided at the request of Candy Veras for advice regarding the diagnosis and treatment of this patient's pregnancy.  The patient's condition can be safely assessed via telemedicine.    The Mode of Transmission:    Secure interactive audio and visual telecommunication system (Video Guidance)    Location of originating and distant sites:      Originating site:   [place], [place]    Distant site:    [place], [place]    For a detailed report of the ultrasound examination, please see the ultrasound report which can be found under the imaging tab.      Toñito Myers MD

## 2024-06-13 PROCEDURE — 93248 EXT ECG>7D<15D REV&INTERPJ: CPT | Performed by: INTERNAL MEDICINE

## 2024-06-27 ENCOUNTER — HOSPITAL ENCOUNTER (OUTPATIENT)
Dept: ULTRASOUND IMAGING | Facility: OTHER | Age: 39
Discharge: HOME | End: 2024-06-27
Payer: COMMERCIAL

## 2024-06-27 ENCOUNTER — PRENATAL OFFICE VISIT (OUTPATIENT)
Dept: OBGYN | Facility: OTHER | Age: 39
End: 2024-06-27
Attending: OBSTETRICS & GYNECOLOGY
Payer: COMMERCIAL

## 2024-06-27 VITALS
SYSTOLIC BLOOD PRESSURE: 110 MMHG | DIASTOLIC BLOOD PRESSURE: 72 MMHG | HEART RATE: 64 BPM | WEIGHT: 195 LBS | BODY MASS INDEX: 31.47 KG/M2

## 2024-06-27 DIAGNOSIS — O09.899 HX OF PRETERM DELIVERY, CURRENTLY PREGNANT: ICD-10-CM

## 2024-06-27 DIAGNOSIS — O09.522 MULTIGRAVIDA OF ADVANCED MATERNAL AGE IN SECOND TRIMESTER: Primary | ICD-10-CM

## 2024-06-27 PROCEDURE — 76817 TRANSVAGINAL US OBSTETRIC: CPT

## 2024-06-27 PROCEDURE — 99207 PR OB VISIT-NO CHARGE - GICH ONLY: CPT | Performed by: OBSTETRICS & GYNECOLOGY

## 2024-06-27 ASSESSMENT — PAIN SCALES - GENERAL: PAINLEVEL: SEVERE PAIN (7)

## 2024-06-27 NOTE — PROGRESS NOTES
Return OB Visit    S: Patient is feeling well overall. No cramping or VB. Still has intermittent palpitations but was reassured by ZIO patch    O: /72 (BP Location: Right arm, Patient Position: Sitting, Cuff Size: Adult Regular)   Pulse 64   Wt 88.5 kg (195 lb)   LMP 2024 (Approximate)   BMI 31.47 kg/m    Gen: Well-appearing, NAD  See OB Flowsheet    A/P:  Pat Mccabe is a 38 year old  at 21w3d by 8w3d US, here for return OB visit.  Hx of spontaneous  delivery x2, last pregnancy was term (37w5d)  - normal cervical length 2024, has repeat scheduled in 2 weeks    AMA  - on ASA 81mg  - needs weekly NST starting 36 weeks  - elevated ALT on initial HELLP labs, resolved on recheck. Otherwise normal baseline labs     Palpitations:  - s/p ZIO patch with predominantly NSR  - Recommend TSH, declines to do today and prefers to do with next lab draw     PNC: Rh positive, Rubella immune  Genetics: declines  Imaging: dating US at 8w3d, level 2 US normal  Immunizations: none  RTC 4 weeks- CBC, GCT, syphilis, TSH next visit    Mikayla Shen MD FACOG  OB/GYN  2024 1:28 PM

## 2024-06-27 NOTE — NURSING NOTE
Chief Complaint   Patient presents with    Prenatal Care     21w3d       Medication Reconciliation: complete    Patient is feeling some movement, offers no other concerns.     Peri Santacruz LPN........................6/27/2024  1:34 PM

## 2024-07-15 ENCOUNTER — TELEPHONE (OUTPATIENT)
Dept: OBGYN | Facility: OTHER | Age: 39
End: 2024-07-15
Payer: MEDICAID

## 2024-07-15 NOTE — TELEPHONE ENCOUNTER
Pat had a cervical length ultrasound scheduled for last Friday, but she canceled it.  She is wondering if she needs to have that done or not?  Our next ultrasound opening is on 8/16/24.  Rosemary Ralph on 7/15/2024 at 10:36 AM

## 2024-07-15 NOTE — TELEPHONE ENCOUNTER
"Per [facility] US on 6/12/2024 \"Given history of PTB x 2, recommend repeat assessment of cervical length with transvaginal US in 2  ad 4 weeks. If CL shortens to < 30 mm, then recommend increasing to weekly CL assessment and please call MFM, as CL < 25 mm would be an indication to initiate  vaginal progesterone vs cervical cerclage. I anticipate that these follow up US will be scheduled via [facility] Radiology.\"    Pt should be scheduled as soon as possible to get US.     Shea Grimes RN on 7/15/2024 at 10:44 AM    "

## 2024-07-15 NOTE — TELEPHONE ENCOUNTER
Patient was scheduled for 7/30 at 2 pm.  That was the next available work-in appointment per [facility] in Radiology.  Rosemary Ralph on 7/15/2024 at 11:58 AM

## 2024-07-25 ENCOUNTER — PRENATAL OFFICE VISIT (OUTPATIENT)
Dept: OBGYN | Facility: OTHER | Age: 39
End: 2024-07-25
Payer: COMMERCIAL

## 2024-07-25 VITALS
SYSTOLIC BLOOD PRESSURE: 124 MMHG | HEART RATE: 84 BPM | WEIGHT: 194 LBS | BODY MASS INDEX: 31.31 KG/M2 | DIASTOLIC BLOOD PRESSURE: 72 MMHG

## 2024-07-25 DIAGNOSIS — Z34.92 ENCOUNTER FOR PREGNANCY RELATED EXAMINATION IN SECOND TRIMESTER: Primary | ICD-10-CM

## 2024-07-25 LAB
BASOPHILS # BLD AUTO: 0 10E3/UL (ref 0–0.2)
BASOPHILS NFR BLD AUTO: 0 %
EOSINOPHIL # BLD AUTO: 0.1 10E3/UL (ref 0–0.7)
EOSINOPHIL NFR BLD AUTO: 1 %
ERYTHROCYTE [DISTWIDTH] IN BLOOD BY AUTOMATED COUNT: 13.6 % (ref 10–15)
GLUCOSE 1H P 50 G GLC PO SERPL-MCNC: 151 MG/DL (ref 70–129)
HCT VFR BLD AUTO: 36.5 % (ref 35–47)
HGB BLD-MCNC: 12.1 G/DL (ref 11.7–15.7)
IMM GRANULOCYTES # BLD: 0.1 10E3/UL
IMM GRANULOCYTES NFR BLD: 1 %
LYMPHOCYTES # BLD AUTO: 2.2 10E3/UL (ref 0.8–5.3)
LYMPHOCYTES NFR BLD AUTO: 24 %
MCH RBC QN AUTO: 30.3 PG (ref 26.5–33)
MCHC RBC AUTO-ENTMCNC: 33.2 G/DL (ref 31.5–36.5)
MCV RBC AUTO: 91 FL (ref 78–100)
MONOCYTES # BLD AUTO: 0.4 10E3/UL (ref 0–1.3)
MONOCYTES NFR BLD AUTO: 4 %
NEUTROPHILS # BLD AUTO: 6.3 10E3/UL (ref 1.6–8.3)
NEUTROPHILS NFR BLD AUTO: 70 %
NRBC # BLD AUTO: 0 10E3/UL
NRBC BLD AUTO-RTO: 0 /100
PLATELET # BLD AUTO: 186 10E3/UL (ref 150–450)
RBC # BLD AUTO: 4 10E6/UL (ref 3.8–5.2)
TSH SERPL DL<=0.005 MIU/L-ACNC: 1.15 UIU/ML (ref 0.3–4.2)
WBC # BLD AUTO: 9 10E3/UL (ref 4–11)

## 2024-07-25 PROCEDURE — 86780 TREPONEMA PALLIDUM: CPT | Mod: ZL

## 2024-07-25 PROCEDURE — 36415 COLL VENOUS BLD VENIPUNCTURE: CPT | Mod: ZL

## 2024-07-25 PROCEDURE — 99207 PR OB VISIT-NO CHARGE - GICH ONLY: CPT

## 2024-07-25 PROCEDURE — 85025 COMPLETE CBC W/AUTO DIFF WBC: CPT | Mod: ZL

## 2024-07-25 PROCEDURE — 82950 GLUCOSE TEST: CPT | Mod: ZL

## 2024-07-25 PROCEDURE — 84443 ASSAY THYROID STIM HORMONE: CPT | Mod: ZL

## 2024-07-25 ASSESSMENT — PAIN SCALES - GENERAL: PAINLEVEL: NO PAIN (0)

## 2024-07-25 NOTE — NURSING NOTE
"Chief Complaint   Patient presents with    Prenatal Care     25w 3d     Pt presents to clinic today for prenatal care 25w 3d . Pt is doing well and would like to discuss heart monitor results.   Initial /72   Pulse 84   Wt 88 kg (194 lb)   LMP 02/02/2024 (Approximate)   BMI 31.31 kg/m   Estimated body mass index is 31.31 kg/m  as calculated from the following:    Height as of 3/28/24: 1.676 m (5' 6\").    Weight as of this encounter: 88 kg (194 lb).  Medication Reconciliation: complete        Manuela Agosto, JAMES    "

## 2024-07-25 NOTE — PROGRESS NOTES
OB Visit    S: Patient is feeling well besides palpitations still occurring and varicose veins. Denies LOF, VB, or regular Ctx. + FM.    Concerns: none    O: /72   Pulse 84   Wt 88 kg (194 lb)   LMP 2024 (Approximate)   BMI 31.31 kg/m    Gen: Well-appearing, NAD  FH: 26  FHT: 147      A/P:  Pat Mccabe is a 38 year old  at 25w3d by 8w3d US, here for return OB visit.  Hx of spontaneous  delivery x2, last pregnancy was term (37w5d)  - normal cervical length 2024      AMA  - on ASA 81mg  - needs weekly NST starting 36 weeks  - elevated ALT on initial HELLP labs, resolved on recheck. Otherwise normal baseline labs     Palpitations:  - s/p ZIO patch with predominantly NSR  - TSH pending     Varicose veins  - compression socks     PNC: Rh positive, Rubella immune, GCT today., GBS TBD   Genetics: declines  Imaging: dating US at 8w3d, level 2 US normal  Immunizations: none  RTC 4 weeks      Candy GARZA, FNP-C  2024 2:13 PM

## 2024-07-26 LAB — T PALLIDUM AB SER QL: NONREACTIVE

## 2024-07-30 ENCOUNTER — HOSPITAL ENCOUNTER (OUTPATIENT)
Dept: ULTRASOUND IMAGING | Facility: OTHER | Age: 39
Discharge: HOME | End: 2024-07-30
Payer: COMMERCIAL

## 2024-07-30 DIAGNOSIS — O09.899 HX OF PRETERM DELIVERY, CURRENTLY PREGNANT: ICD-10-CM

## 2024-07-30 PROCEDURE — 76817 TRANSVAGINAL US OBSTETRIC: CPT

## 2024-08-02 ENCOUNTER — LAB (OUTPATIENT)
Dept: LAB | Facility: OTHER | Age: 39
End: 2024-08-02
Payer: COMMERCIAL

## 2024-08-02 DIAGNOSIS — Z34.92 ENCOUNTER FOR PREGNANCY RELATED EXAMINATION IN SECOND TRIMESTER: Primary | ICD-10-CM

## 2024-08-02 DIAGNOSIS — Z34.92 ENCOUNTER FOR PREGNANCY RELATED EXAMINATION IN SECOND TRIMESTER: ICD-10-CM

## 2024-08-02 DIAGNOSIS — O24.419 GESTATIONAL DIABETES: ICD-10-CM

## 2024-08-02 LAB
GESTATIONAL GTT 1 HR POST DOSE: 192 MG/DL (ref 60–179)
GESTATIONAL GTT 2 HR POST DOSE: 175 MG/DL (ref 60–154)
GESTATIONAL GTT 3 HR POST DOSE: 129 MG/DL (ref 60–139)
GLUCOSE P FAST SERPL-MCNC: 93 MG/DL (ref 60–94)

## 2024-08-02 PROCEDURE — 82947 ASSAY GLUCOSE BLOOD QUANT: CPT | Mod: ZL

## 2024-08-02 PROCEDURE — 82951 GLUCOSE TOLERANCE TEST (GTT): CPT | Mod: ZL

## 2024-08-02 PROCEDURE — 82950 GLUCOSE TEST: CPT | Mod: ZL

## 2024-08-02 PROCEDURE — 36415 COLL VENOUS BLD VENIPUNCTURE: CPT | Mod: ZL

## 2024-08-02 RX ORDER — LANCETS
EACH MISCELLANEOUS
Qty: 400 EACH | Refills: 3 | Status: SHIPPED | OUTPATIENT
Start: 2024-08-02

## 2024-08-03 ENCOUNTER — HEALTH MAINTENANCE LETTER (OUTPATIENT)
Age: 39
End: 2024-08-03

## 2024-08-08 ENCOUNTER — VIRTUAL VISIT (OUTPATIENT)
Dept: EDUCATION SERVICES | Facility: OTHER | Age: 39
End: 2024-08-08
Payer: COMMERCIAL

## 2024-08-08 DIAGNOSIS — Z34.92 ENCOUNTER FOR PREGNANCY RELATED EXAMINATION IN SECOND TRIMESTER: ICD-10-CM

## 2024-08-08 DIAGNOSIS — O24.419 GESTATIONAL DIABETES: ICD-10-CM

## 2024-08-08 NOTE — PROGRESS NOTES
Unable to reach patient today.  Reached out to scheduling to please call for reschedule.  Rosemary GAO left patient a message regarding visit reschedule.    Alysha Masters RN, BSN, [facility]  8/8/2024 3:08 PM

## 2024-08-22 ENCOUNTER — PRENATAL OFFICE VISIT (OUTPATIENT)
Dept: OBGYN | Facility: OTHER | Age: 39
End: 2024-08-22
Attending: OBSTETRICS & GYNECOLOGY
Payer: COMMERCIAL

## 2024-08-22 VITALS
BODY MASS INDEX: 31.96 KG/M2 | SYSTOLIC BLOOD PRESSURE: 106 MMHG | WEIGHT: 198 LBS | DIASTOLIC BLOOD PRESSURE: 60 MMHG | HEART RATE: 76 BPM

## 2024-08-22 DIAGNOSIS — O09.523 MULTIGRAVIDA OF ADVANCED MATERNAL AGE IN THIRD TRIMESTER: Primary | ICD-10-CM

## 2024-08-22 PROCEDURE — 99207 PR OB VISIT-NO CHARGE - GICH ONLY: CPT | Performed by: OBSTETRICS & GYNECOLOGY

## 2024-08-22 ASSESSMENT — PAIN SCALES - GENERAL: PAINLEVEL: NO PAIN (0)

## 2024-08-22 NOTE — PROGRESS NOTES
Return OB Visit    S: Patient is feeling well. No ctx, VB or LOF. +FM    Overall glucoses have been good. Is not eating anything after dinner.  Fasting  (4/8 elevated)  Checking between 1-2 hours postprandial,  (5/13 over 120)    O: /60 (BP Location: Right arm, Patient Position: Sitting, Cuff Size: Adult Large)   Pulse 76   Wt 89.8 kg (198 lb)   LMP 2024 (Approximate)   BMI 31.96 kg/m    Gen: Well-appearing, NAD  See OB Flowsheet    A/P:  Pat Mccabe is a 38 year old  at 29w3d by 8w3d US, here for return OB visit.  Hx of spontaneous  delivery x2, last pregnancy was term (37w5d)  - normal serial cervical lengths     AMA  - on ASA 81mg  - Growth US at 32 weeks ord'd  - needs weekly NST starting 36 weeks  - elevated ALT on initial HELLP labs, resolved on recheck. Otherwise normal baseline labs     Palpitations:  - s/p ZIO patch with predominantly NSR  - normal TSH    GDMA1:  - overall glucoses okay. Recommend protein based snack at bedtime to see if this helps her fasting levels     PNC: Rh positive, Rubella immune  Genetics: declines  Imaging: dating US at 8w3d, level 2 US normal  Immunizations: declines Tdap  RTC 3 weeks with growth US    Mikayla Shen MD FACOG  OB/GYN  2024 4:29 PM

## 2024-08-22 NOTE — NURSING NOTE
Chief Complaint   Patient presents with    Prenatal Care     29w2d       Medication Reconciliation: complete  Pt presents to clinic today for prenatal care. Pt denies any bleeding, or leakage of fluid at this time. States baby is moving good. Patient denies contractions.       Peri Santacruz LPN........................8/22/2024  3:51 PM

## 2024-09-13 ENCOUNTER — HOSPITAL ENCOUNTER (OUTPATIENT)
Dept: ULTRASOUND IMAGING | Facility: OTHER | Age: 39
Discharge: HOME | End: 2024-09-13
Attending: OBSTETRICS & GYNECOLOGY
Payer: MEDICAID

## 2024-09-13 ENCOUNTER — PRENATAL OFFICE VISIT (OUTPATIENT)
Dept: OBGYN | Facility: OTHER | Age: 39
End: 2024-09-13
Attending: OBSTETRICS & GYNECOLOGY
Payer: MEDICAID

## 2024-09-13 VITALS
DIASTOLIC BLOOD PRESSURE: 62 MMHG | HEART RATE: 72 BPM | SYSTOLIC BLOOD PRESSURE: 108 MMHG | WEIGHT: 194 LBS | BODY MASS INDEX: 31.31 KG/M2

## 2024-09-13 DIAGNOSIS — O24.419 GDM, CLASS A2: ICD-10-CM

## 2024-09-13 DIAGNOSIS — O09.523 MULTIGRAVIDA OF ADVANCED MATERNAL AGE IN THIRD TRIMESTER: ICD-10-CM

## 2024-09-13 DIAGNOSIS — O09.523 MULTIGRAVIDA OF ADVANCED MATERNAL AGE IN THIRD TRIMESTER: Primary | ICD-10-CM

## 2024-09-13 PROCEDURE — 76816 OB US FOLLOW-UP PER FETUS: CPT

## 2024-09-13 PROCEDURE — 99207 PR OB VISIT-NO CHARGE - GICH ONLY: CPT | Performed by: OBSTETRICS & GYNECOLOGY

## 2024-09-13 RX ORDER — PEN NEEDLE, DIABETIC 32GX 5/32"
NEEDLE, DISPOSABLE MISCELLANEOUS
Qty: 100 EACH | Refills: 3 | Status: SHIPPED | OUTPATIENT
Start: 2024-09-13

## 2024-09-13 RX ORDER — GLUCOSAMINE HCL/CHONDROITIN SU 500-400 MG
CAPSULE ORAL
Qty: 100 EACH | Refills: 3 | Status: SHIPPED | OUTPATIENT
Start: 2024-09-13

## 2024-09-13 RX ORDER — INSULIN GLARGINE 100 [IU]/ML
10 INJECTION, SOLUTION SUBCUTANEOUS AT BEDTIME
Qty: 15 ML | Refills: 1 | Status: SHIPPED | OUTPATIENT
Start: 2024-09-13

## 2024-09-13 ASSESSMENT — PAIN SCALES - GENERAL: PAINLEVEL: NO PAIN (0)

## 2024-09-13 NOTE — NURSING NOTE
Chief Complaint   Patient presents with    Prenatal Care     32w4d       Medication Reconciliation: complete    Pt presents to clinic today for prenatal care. Pt denies any bleeding, or leakage of fluid at this time. States baby is moving good. Patient denies contractions.       Peri Santacruz LPN........................9/13/2024  10:13 AM

## 2024-09-13 NOTE — PROGRESS NOTES
Return OB Visit    S: Patient is feeling well. No ctx, VB or LOF. +FM.     Fasting glucoses still elevated despite adding bedtime snack. Usually  with >50% above goal. Postprandials are still all within goal    O: /62 (BP Location: Right arm, Patient Position: Sitting, Cuff Size: Adult Large)   Pulse 72   Wt 88 kg (194 lb)   LMP 2024 (Approximate)   BMI 31.31 kg/m    Gen: Well-appearing, NAD  See OB Flowsheet    A/P:  Pat Mccabe is a 38 year old  at 32w4d by 8w3d US, here for return OB visit.  Hx of spontaneous  delivery x2, last pregnancy was term (37w5d)  - normal serial cervical lengths     AMA  - on ASA 81mg  - Growth US at 32 weeks EFW 2453g (93%ile)  - needs weekly NST starting 36 weeks  - elevated ALT on initial HELLP labs, resolved on recheck. Otherwise normal baseline labs     Palpitations:  - s/p ZIO patch with predominantly NSR  - normal TSH     GDMA2  - postprandial glucoses all within goal but fasting still elevated and fetal growth accelerated. Discussed reasoning for starting insulin, and she is accepting. Will plan for lantus 10U qHS     PNC: Rh positive, Rubella immune  Genetics: declines  Imaging: dating US at 8w3d, level 2 US normal  Immunizations: declines Tdap  RTC 2 weeks    Mikayla Shen MD FACOG  OB/GYN  2024 10:21 AM

## 2024-09-26 ENCOUNTER — PRENATAL OFFICE VISIT (OUTPATIENT)
Dept: OBGYN | Facility: OTHER | Age: 39
End: 2024-09-26
Attending: OBSTETRICS & GYNECOLOGY
Payer: MEDICAID

## 2024-09-26 VITALS
DIASTOLIC BLOOD PRESSURE: 60 MMHG | HEART RATE: 80 BPM | WEIGHT: 196 LBS | SYSTOLIC BLOOD PRESSURE: 102 MMHG | BODY MASS INDEX: 31.64 KG/M2

## 2024-09-26 DIAGNOSIS — O09.523 MULTIGRAVIDA OF ADVANCED MATERNAL AGE IN THIRD TRIMESTER: Primary | ICD-10-CM

## 2024-09-26 DIAGNOSIS — O24.419 GDM, CLASS A2: ICD-10-CM

## 2024-09-26 PROCEDURE — 99207 PR OB VISIT-NO CHARGE - GICH ONLY: CPT | Performed by: OBSTETRICS & GYNECOLOGY

## 2024-09-26 ASSESSMENT — PAIN SCALES - GENERAL: PAINLEVEL: NO PAIN (0)

## 2024-09-26 NOTE — PROGRESS NOTES
Return OB Visit    S: Pat is feeling well, just uncomfortable. No ctx, VB or LOF. +FM.    Fasting glucoses still elevated despite adding insulin. In past week, have been  with majority > 95. 2 hr -136    O: /60 (BP Location: Right arm, Patient Position: Sitting, Cuff Size: Adult Large)   Pulse 80   Wt 88.9 kg (196 lb)   LMP 2024 (Approximate)   BMI 31.64 kg/m    Gen: Well-appearing, NAD  See OB Flowsheet    A/P:  Pat Mccabe is a 38 year old  at 34w3d by 8w3d US, here for return OB visit.  Hx of spontaneous  delivery x2, last pregnancy was term (37w5d)  - normal serial cervical lengths     AMA  - on ASA 81mg  - Growth US at 32 weeks EFW 2453g (93%ile). Repeat in 4 weeks  - needs weekly NST starting 36 weeks  - elevated ALT on initial HELLP labs, resolved on recheck. Otherwise normal baseline labs     Palpitations:  - s/p ZIO patch with predominantly NSR  - normal TSH     GDMA2  - Increase lantus to 12U nightly.   - weekly BPPs ord'd     PNC: Rh positive, Rubella immune  Genetics: declines  Imaging: dating US at 8w3d, level 2 US normal  Immunizations: declines Tdap  RTC 2 weeks- Plans RSV next visit, needs GBS next visit    Mikayla Shen MD FACOG  OB/GYN  2024 3:23 PM

## 2024-09-26 NOTE — NURSING NOTE
Chief Complaint   Patient presents with    Prenatal Care     34w3d       Medication Reconciliation: complete  Pt presents to clinic today for prenatal care. Pt denies any bleeding, or leakage of fluid at this time. States baby is moving good. Patient denies contractions.       Peri Santacruz LPN........................9/26/2024  3:13 PM

## 2024-10-10 ENCOUNTER — HOSPITAL ENCOUNTER (OUTPATIENT)
Dept: ULTRASOUND IMAGING | Facility: OTHER | Age: 39
Discharge: HOME | End: 2024-10-10
Attending: OBSTETRICS & GYNECOLOGY
Payer: COMMERCIAL

## 2024-10-10 ENCOUNTER — PRENATAL OFFICE VISIT (OUTPATIENT)
Dept: OBGYN | Facility: OTHER | Age: 39
End: 2024-10-10
Attending: OBSTETRICS & GYNECOLOGY
Payer: COMMERCIAL

## 2024-10-10 ENCOUNTER — MYC MEDICAL ADVICE (OUTPATIENT)
Dept: FAMILY MEDICINE | Facility: OTHER | Age: 39
End: 2024-10-10
Payer: COMMERCIAL

## 2024-10-10 VITALS
WEIGHT: 197.1 LBS | BODY MASS INDEX: 31.81 KG/M2 | TEMPERATURE: 97.5 F | SYSTOLIC BLOOD PRESSURE: 122 MMHG | RESPIRATION RATE: 14 BRPM | OXYGEN SATURATION: 96 % | DIASTOLIC BLOOD PRESSURE: 76 MMHG | HEART RATE: 65 BPM

## 2024-10-10 DIAGNOSIS — O24.419 GDM, CLASS A2: ICD-10-CM

## 2024-10-10 DIAGNOSIS — O09.523 MULTIGRAVIDA OF ADVANCED MATERNAL AGE IN THIRD TRIMESTER: Primary | ICD-10-CM

## 2024-10-10 DIAGNOSIS — O09.523 MULTIGRAVIDA OF ADVANCED MATERNAL AGE IN THIRD TRIMESTER: ICD-10-CM

## 2024-10-10 PROCEDURE — 99207 PR OB VISIT-NO CHARGE - GICH ONLY: CPT | Performed by: OBSTETRICS & GYNECOLOGY

## 2024-10-10 PROCEDURE — G0463 HOSPITAL OUTPT CLINIC VISIT: HCPCS | Mod: 25

## 2024-10-10 PROCEDURE — 76819 FETAL BIOPHYS PROFIL W/O NST: CPT

## 2024-10-10 PROCEDURE — 90471 IMMUNIZATION ADMIN: CPT

## 2024-10-10 PROCEDURE — 87653 STREP B DNA AMP PROBE: CPT | Mod: ZL | Performed by: OBSTETRICS & GYNECOLOGY

## 2024-10-10 ASSESSMENT — PAIN SCALES - GENERAL: PAINLEVEL: NO PAIN (0)

## 2024-10-10 NOTE — NURSING NOTE
"Chief Complaint   Patient presents with    Prenatal Care     36 weeks 3 days        Initial /76   Pulse 65   Temp 97.5  F (36.4  C) (Tympanic)   Resp 14   Wt 89.4 kg (197 lb 1.6 oz)   LMP 02/02/2024 (Approximate)   SpO2 96%   Breastfeeding No   BMI 31.81 kg/m   Estimated body mass index is 31.81 kg/m  as calculated from the following:    Height as of 3/28/24: 1.676 m (5' 6\").    Weight as of this encounter: 89.4 kg (197 lb 1.6 oz).  Medication Reconciliation: complete    Sarahi Kemp CMA    Immunization Documentation    Prior to Immunization administration, verified patients identity using patient's name and date of birth. Please see IMMUNIZATIONS  and order for additional information.  Patient / Parent instructed to remain in clinic for 15 minutes and report any adverse reaction to staff immediately.    Was the entire amount of vaccines given used? Yes    Sarahi Kemp CMA  10/10/2024   3:25 PM    "

## 2024-10-10 NOTE — TELEPHONE ENCOUNTER
10/10/24  Seeing Meliza Shen today at 3pm after US.      Patient wondering if Dr. Fletcher would recommend she receive the RSV vaccine.     Jessie Colbert RN on 10/10/2024 at 9:13 AM

## 2024-10-10 NOTE — PROGRESS NOTES
Return OB Visit    S: Pat is feeling well. No ctx, VB or LOF. +FM.    Fasting glucoses have improved. Usually low 90s. All postprandials within goal    O: /76   Pulse 65   Temp 97.5  F (36.4  C) (Tympanic)   Resp 14   Wt 89.4 kg (197 lb 1.6 oz)   LMP 2024 (Approximate)   SpO2 96%   Breastfeeding No   BMI 31.81 kg/m    Gen: Well-appearing, NAD  See OB Flowsheet    BPP     A/P:  Pat Mccabe is a 38 year old  at 36w3d by 8w3d US, here for return OB visit.  Hx of spontaneous  delivery x2, last pregnancy was term (37w5d)  - normal serial cervical lengths     AMA  - on ASA 81mg  - Growth US 10/10/2024 EFW 3340g (88%ile, AC >98%ile)  - needs weekly NST starting 36 weeks  - elevated ALT on initial HELLP labs, resolved on recheck. Otherwise normal baseline labs     Palpitations:  - s/p ZIO patch with predominantly NSR  - normal TSH     GDMA2  - lantus to 12U nightly.   - weekly BPPs      PNC: Rh positive, Rubella immune  Genetics: declines  Imaging: dating US at 8w3d, level 2 US normal  Immunizations: declines Tdap. RSV 10/10/2024   RTC weekly until delivery    Mikayla Shen MD FACOG  OB/GYN  10/10/2024 3:08 PM

## 2024-10-12 LAB — GP B STREP DNA SPEC QL NAA+PROBE: NEGATIVE

## 2024-10-15 ENCOUNTER — PRENATAL OFFICE VISIT (OUTPATIENT)
Dept: OBGYN | Facility: OTHER | Age: 39
End: 2024-10-15
Attending: NURSE PRACTITIONER
Payer: COMMERCIAL

## 2024-10-15 VITALS
SYSTOLIC BLOOD PRESSURE: 118 MMHG | OXYGEN SATURATION: 100 % | DIASTOLIC BLOOD PRESSURE: 78 MMHG | RESPIRATION RATE: 16 BRPM | HEART RATE: 71 BPM | BODY MASS INDEX: 31.65 KG/M2 | WEIGHT: 196.1 LBS | TEMPERATURE: 97.1 F

## 2024-10-15 DIAGNOSIS — O24.419 GDM, CLASS A2: ICD-10-CM

## 2024-10-15 DIAGNOSIS — R10.84 ABDOMINAL PAIN, GENERALIZED: Primary | ICD-10-CM

## 2024-10-15 DIAGNOSIS — O09.899 HX OF PRETERM DELIVERY, CURRENTLY PREGNANT: ICD-10-CM

## 2024-10-15 DIAGNOSIS — O09.523 MULTIGRAVIDA OF ADVANCED MATERNAL AGE IN THIRD TRIMESTER: ICD-10-CM

## 2024-10-15 LAB
ALBUMIN MFR UR ELPH: 19 MG/DL
ALBUMIN SERPL BCG-MCNC: 3.8 G/DL (ref 3.5–5.2)
ALBUMIN UR-MCNC: 20 MG/DL
ALP SERPL-CCNC: 145 U/L (ref 40–150)
ALT SERPL W P-5'-P-CCNC: 16 U/L (ref 0–50)
ANION GAP SERPL CALCULATED.3IONS-SCNC: 12 MMOL/L (ref 7–15)
APPEARANCE UR: CLEAR
AST SERPL W P-5'-P-CCNC: 19 U/L (ref 0–45)
BACTERIA #/AREA URNS HPF: ABNORMAL /HPF
BASOPHILS # BLD AUTO: 0 10E3/UL (ref 0–0.2)
BASOPHILS NFR BLD AUTO: 0 %
BILIRUB SERPL-MCNC: 0.6 MG/DL
BILIRUB UR QL STRIP: NEGATIVE
BUN SERPL-MCNC: 9 MG/DL (ref 6–20)
CALCIUM SERPL-MCNC: 9.1 MG/DL (ref 8.8–10.4)
CHLORIDE SERPL-SCNC: 101 MMOL/L (ref 98–107)
COLOR UR AUTO: YELLOW
CREAT SERPL-MCNC: 0.58 MG/DL (ref 0.51–0.95)
CREAT UR-MCNC: 100.7 MG/DL
EGFRCR SERPLBLD CKD-EPI 2021: >90 ML/MIN/1.73M2
EOSINOPHIL # BLD AUTO: 0 10E3/UL (ref 0–0.7)
EOSINOPHIL NFR BLD AUTO: 1 %
ERYTHROCYTE [DISTWIDTH] IN BLOOD BY AUTOMATED COUNT: 13.5 % (ref 10–15)
GLUCOSE SERPL-MCNC: 104 MG/DL (ref 70–99)
GLUCOSE UR STRIP-MCNC: NEGATIVE MG/DL
HCO3 SERPL-SCNC: 23 MMOL/L (ref 22–29)
HCT VFR BLD AUTO: 37.8 % (ref 35–47)
HGB BLD-MCNC: 13.1 G/DL (ref 11.7–15.7)
HGB UR QL STRIP: NEGATIVE
IMM GRANULOCYTES # BLD: 0 10E3/UL
IMM GRANULOCYTES NFR BLD: 1 %
KETONES UR STRIP-MCNC: 40 MG/DL
LEUKOCYTE ESTERASE UR QL STRIP: NEGATIVE
LYMPHOCYTES # BLD AUTO: 2.6 10E3/UL (ref 0.8–5.3)
LYMPHOCYTES NFR BLD AUTO: 29 %
MCH RBC QN AUTO: 30.5 PG (ref 26.5–33)
MCHC RBC AUTO-ENTMCNC: 34.7 G/DL (ref 31.5–36.5)
MCV RBC AUTO: 88 FL (ref 78–100)
MONOCYTES # BLD AUTO: 0.4 10E3/UL (ref 0–1.3)
MONOCYTES NFR BLD AUTO: 5 %
MUCOUS THREADS #/AREA URNS LPF: PRESENT /LPF
NEUTROPHILS # BLD AUTO: 5.7 10E3/UL (ref 1.6–8.3)
NEUTROPHILS NFR BLD AUTO: 65 %
NITRATE UR QL: NEGATIVE
NRBC # BLD AUTO: 0 10E3/UL
NRBC BLD AUTO-RTO: 0 /100
PH UR STRIP: 6.5 [PH] (ref 5–9)
PLATELET # BLD AUTO: 192 10E3/UL (ref 150–450)
POTASSIUM SERPL-SCNC: 3.6 MMOL/L (ref 3.4–5.3)
PROT SERPL-MCNC: 6.9 G/DL (ref 6.4–8.3)
PROT/CREAT 24H UR: 0.19 MG/MG CR (ref 0–0.2)
RBC # BLD AUTO: 4.29 10E6/UL (ref 3.8–5.2)
RBC URINE: 0 /HPF
SODIUM SERPL-SCNC: 136 MMOL/L (ref 135–145)
SP GR UR STRIP: 1.02 (ref 1–1.03)
SQUAMOUS EPITHELIAL: 1 /HPF
UROBILINOGEN UR STRIP-MCNC: 3 MG/DL
WBC # BLD AUTO: 8.9 10E3/UL (ref 4–11)
WBC URINE: 1 /HPF

## 2024-10-15 PROCEDURE — 85004 AUTOMATED DIFF WBC COUNT: CPT | Mod: ZL | Performed by: NURSE PRACTITIONER

## 2024-10-15 PROCEDURE — 84156 ASSAY OF PROTEIN URINE: CPT | Mod: ZL | Performed by: NURSE PRACTITIONER

## 2024-10-15 PROCEDURE — 85014 HEMATOCRIT: CPT | Mod: ZL | Performed by: NURSE PRACTITIONER

## 2024-10-15 PROCEDURE — 99207 PR OB VISIT-NO CHARGE - GICH ONLY: CPT | Performed by: NURSE PRACTITIONER

## 2024-10-15 PROCEDURE — G0463 HOSPITAL OUTPT CLINIC VISIT: HCPCS

## 2024-10-15 PROCEDURE — 36415 COLL VENOUS BLD VENIPUNCTURE: CPT | Mod: ZL | Performed by: NURSE PRACTITIONER

## 2024-10-15 PROCEDURE — 81001 URINALYSIS AUTO W/SCOPE: CPT | Mod: ZL | Performed by: NURSE PRACTITIONER

## 2024-10-15 PROCEDURE — 80053 COMPREHEN METABOLIC PANEL: CPT | Mod: ZL | Performed by: NURSE PRACTITIONER

## 2024-10-15 ASSESSMENT — PAIN SCALES - GENERAL: PAINLEVEL: SEVERE PAIN (6)

## 2024-10-15 NOTE — PROGRESS NOTES
Chief Complaint: Recheck OB visit at 37w1d    HPI: Pat Mccabe presents for a routine OB visit now at 37w1d  here for return OB visit.    Since 5 AM this morning patient has had left-sided abdominal pain.  She would describe this as a dull ache and will intermittently have sharp abdominal pain.  Her abdomen feels tight.  This has not improved all day.   She does not feel like this is like contractions.  This morning she did feel hungry and was nauseous immediately after eating which was not typical for her.  Her sugars have been better, she continues to monitor them at home.  She is taking 12 units of Lantus at bedtime.  Her morning blood sugars in the low 90s.    This past weekend she was active painting, moving things and had a birthday party.    Notices normal movement, denies any leaking of fluid, change in vaginal discharge or bleeding.    OB History    Para Term  AB Living   6 3 1 2 2 3   SAB IAB Ectopic Multiple Live Births   2 0 0 0 3      # Outcome Date GA Lbr Tushar/2nd Weight Sex Type Anes PTL Lv   6 Current            5 Term 10/11/21 37w5d 03:00 / 00:06 3.731 kg (8 lb 3.6 oz) M Vag-Spont None N RODRIGO      Name: RAZA,MALE-PAT      Apgar1: 8  Apgar5: 9   4  11/02/15 36w5d  3.595 kg (7 lb 14.8 oz) M Vag-Spont  N RODRIGO      Apgar1: 9  Apgar5: 10   3 SAB 02/01/15        FD   2 SAB 09/15/14        FD   1  13 35w0d 10:00 / 00:03 3.374 kg (7 lb 7 oz) F Vag-Vacuum   RODRIGO      Birth Comments: System Generated. Please review and update pregnancy details.      Name: Jacquie Mccabe       O: /78   Pulse 71   Temp 97.1  F (36.2  C) (Tympanic)   Resp 16   Wt 89 kg (196 lb 1.6 oz)   LMP 2024 (Approximate)   SpO2 100%   BMI 31.65 kg/m    Body mass index is 31.65 kg/m .  See OB flow sheet  EXAM:  NAD  Cervical check: 2.5cm/thick per Dr. Shen    NST: [id number] bpm with moderate variability, no decels.     Results for orders placed or performed in visit on  10/15/24   Protein  random urine     Status: None   Result Value Ref Range    Total Protein Urine mg/dL 19.0   mg/dL    Total Protein Urine mg/mg Creat 0.19 0.00 - 0.20 mg/mg Cr    Creatinine Urine mg/dL 100.7 mg/dL   UA with Microscopic reflex to Culture     Status: Abnormal    Specimen: Urine, Midstream   Result Value Ref Range    Color Urine Yellow Colorless, Straw, Light Yellow, Yellow    Appearance Urine Clear Clear    Glucose Urine Negative Negative mg/dL    Bilirubin Urine Negative Negative    Ketones Urine 40 (A) Negative mg/dL    Specific Gravity Urine 1.022 1.000 - 1.030    Blood Urine Negative Negative    pH Urine 6.5 5.0 - 9.0    Protein Albumin Urine 20 (A) Negative mg/dL    Urobilinogen Urine 3.0 (A) Normal, 2.0 mg/dL    Nitrite Urine Negative Negative    Leukocyte Esterase Urine Negative Negative    Bacteria Urine Few (A) None Seen /HPF    Mucus Urine Present (A) None Seen /LPF    RBC Urine 0 <=2 /HPF    WBC Urine 1 <=5 /HPF    Squamous Epithelials Urine 1 <=1 /HPF    Narrative    Urine Culture not indicated   Comprehensive Metabolic Panel     Status: Abnormal   Result Value Ref Range    Sodium 136 135 - 145 mmol/L    Potassium 3.6 3.4 - 5.3 mmol/L    Carbon Dioxide (CO2) 23 22 - 29 mmol/L    Anion Gap 12 7 - 15 mmol/L    Urea Nitrogen 9.0 6.0 - 20.0 mg/dL    Creatinine 0.58 0.51 - 0.95 mg/dL    GFR Estimate >90 >60 mL/min/1.73m2    Calcium 9.1 8.8 - 10.4 mg/dL    Chloride 101 98 - 107 mmol/L    Glucose 104 (H) 70 - 99 mg/dL    Alkaline Phosphatase 145 40 - 150 U/L    AST 19 0 - 45 U/L    ALT 16 0 - 50 U/L    Protein Total 6.9 6.4 - 8.3 g/dL    Albumin 3.8 3.5 - 5.2 g/dL    Bilirubin Total 0.6 <=1.2 mg/dL   CBC with platelets and differential     Status: None   Result Value Ref Range    WBC Count 8.9 4.0 - 11.0 10e3/uL    RBC Count 4.29 3.80 - 5.20 10e6/uL    Hemoglobin 13.1 11.7 - 15.7 g/dL    Hematocrit 37.8 35.0 - 47.0 %    MCV 88 78 - 100 fL    MCH 30.5 26.5 - 33.0 pg    MCHC 34.7 31.5 - 36.5 g/dL     RDW 13.5 10.0 - 15.0 %    Platelet Count 192 150 - 450 10e3/uL    % Neutrophils 65 %    % Lymphocytes 29 %    % Monocytes 5 %    % Eosinophils 1 %    % Basophils 0 %    % Immature Granulocytes 1 %    NRBCs per 100 WBC 0 <1 /100    Absolute Neutrophils 5.7 1.6 - 8.3 10e3/uL    Absolute Lymphocytes 2.6 0.8 - 5.3 10e3/uL    Absolute Monocytes 0.4 0.0 - 1.3 10e3/uL    Absolute Eosinophils 0.0 0.0 - 0.7 10e3/uL    Absolute Basophils 0.0 0.0 - 0.2 10e3/uL    Absolute Immature Granulocytes 0.0 <=0.4 10e3/uL    Absolute NRBCs 0.0 10e3/uL   CBC and Differential     Status: None    Narrative    The following orders were created for panel order CBC and Differential.  Procedure                               Abnormality         Status                     ---------                               -----------         ------                     CBC with platelets and d...[[id number]]                      Final result               RBC and Platelet Morphology[[id number]]                                                   Please view results for these tests on the individual orders.       ASSESSMENT/PLAN:     Pat Mccabe is a 38 year old  at 37w1d by 8w3d US, here for return OB visit.  Patient has been having left-sided abdominal pain and tightness since 5 AM this morning.  No change in vaginal discharge, or leaking.  She does feel adequate movement.  She did have some nausea this morning.  She has a history of gestational diabetes,  and precipitous labor.  Blood pressure stable today, 118/78.  Recommend HELLP labs today and NST monitoring.    Hx of spontaneous  delivery x2, last pregnancy was term (37w5d)  - normal serial cervical lengths  -Cervical check today 2.5 cm.    -With last delivery she was dilated to 3 cm and son was delivered 40 minutes later.     AMA  - on ASA 81mg  - Growth US 10/10/2024 EFW 3340g (88%ile, AC >98%ile)  - needs weekly NST starting 36 weeks- NST today  - elevated ALT on initial  HELLP labs, resolved on recheck. HELLP labs today normal      Palpitations:  - s/p ZIO patch with predominantly NSR  - normal TSH     GDMA2  - stable, still monitoring at home   - lantus to 12U nightly.   - weekly BPPs, scheduled 10/17/24     PNC: Rh positive, Rubella immune, GBS negative   Genetics: declines  Imaging: dating US at 8w3d, level 2 US normal  Immunizations: declines Tdap. RSV 10/10/2024     RTC weekly until delivery. Will have follow up with BPP and office visit with Dr. Shen on 10/17.  Reviewed signs of  labor and when to present to labor and delivery if symptoms continue to persist.    ISH AndersonP., R.N., APRN CNP  12:59 PM 10/15/2024

## 2024-10-15 NOTE — NURSING NOTE
"Chief Complaint   Patient presents with    Follow Up     Left side pains       Initial /78   Pulse 71   Temp 97.1  F (36.2  C) (Tympanic)   Resp 16   Wt 89 kg (196 lb 1.6 oz)   LMP 02/02/2024 (Approximate)   SpO2 100%   BMI 31.65 kg/m   Estimated body mass index is 31.65 kg/m  as calculated from the following:    Height as of 3/28/24: 1.676 m (5' 6\").    Weight as of this encounter: 89 kg (196 lb 1.6 oz).  Medication Reconciliation: complete        "

## 2024-10-17 ENCOUNTER — PRENATAL OFFICE VISIT (OUTPATIENT)
Dept: OBGYN | Facility: OTHER | Age: 39
End: 2024-10-17
Attending: OBSTETRICS & GYNECOLOGY
Payer: COMMERCIAL

## 2024-10-17 ENCOUNTER — HOSPITAL ENCOUNTER (OUTPATIENT)
Dept: ULTRASOUND IMAGING | Facility: OTHER | Age: 39
Discharge: HOME | End: 2024-10-17
Attending: OBSTETRICS & GYNECOLOGY
Payer: COMMERCIAL

## 2024-10-17 VITALS
WEIGHT: 198 LBS | DIASTOLIC BLOOD PRESSURE: 74 MMHG | HEART RATE: 66 BPM | SYSTOLIC BLOOD PRESSURE: 108 MMHG | BODY MASS INDEX: 31.96 KG/M2

## 2024-10-17 DIAGNOSIS — O24.419 GDM, CLASS A2: ICD-10-CM

## 2024-10-17 DIAGNOSIS — O09.523 MULTIGRAVIDA OF ADVANCED MATERNAL AGE IN THIRD TRIMESTER: ICD-10-CM

## 2024-10-17 DIAGNOSIS — O09.523 MULTIGRAVIDA OF ADVANCED MATERNAL AGE IN THIRD TRIMESTER: Primary | ICD-10-CM

## 2024-10-17 PROCEDURE — 99207 PR OB VISIT-NO CHARGE - GICH ONLY: CPT | Performed by: OBSTETRICS & GYNECOLOGY

## 2024-10-17 PROCEDURE — 59025 FETAL NON-STRESS TEST: CPT | Performed by: OBSTETRICS & GYNECOLOGY

## 2024-10-17 PROCEDURE — 76819 FETAL BIOPHYS PROFIL W/O NST: CPT

## 2024-10-17 RX ORDER — NALOXONE HYDROCHLORIDE 0.4 MG/ML
0.4 INJECTION, SOLUTION INTRAMUSCULAR; INTRAVENOUS; SUBCUTANEOUS
Status: CANCELLED | OUTPATIENT
Start: 2024-10-17

## 2024-10-17 RX ORDER — IBUPROFEN 200 MG
800 TABLET ORAL
Status: CANCELLED | OUTPATIENT
Start: 2024-10-17

## 2024-10-17 RX ORDER — SODIUM CHLORIDE, SODIUM LACTATE, POTASSIUM CHLORIDE, CALCIUM CHLORIDE 600; 310; 30; 20 MG/100ML; MG/100ML; MG/100ML; MG/100ML
INJECTION, SOLUTION INTRAVENOUS CONTINUOUS
Status: CANCELLED | OUTPATIENT
Start: 2024-10-17

## 2024-10-17 RX ORDER — NALOXONE HYDROCHLORIDE 0.4 MG/ML
0.2 INJECTION, SOLUTION INTRAMUSCULAR; INTRAVENOUS; SUBCUTANEOUS
Status: CANCELLED | OUTPATIENT
Start: 2024-10-17

## 2024-10-17 RX ORDER — TERBUTALINE SULFATE 1 MG/ML
0.25 INJECTION, SOLUTION SUBCUTANEOUS
Status: CANCELLED | OUTPATIENT
Start: 2024-10-17

## 2024-10-17 RX ORDER — LIDOCAINE 40 MG/G
CREAM TOPICAL
Status: CANCELLED | OUTPATIENT
Start: 2024-10-17

## 2024-10-17 RX ORDER — LOPERAMIDE HYDROCHLORIDE 2 MG/1
4 CAPSULE ORAL
Status: CANCELLED | OUTPATIENT
Start: 2024-10-17

## 2024-10-17 RX ORDER — ONDANSETRON 4 MG/1
4 TABLET, ORALLY DISINTEGRATING ORAL EVERY 6 HOURS PRN
Status: CANCELLED | OUTPATIENT
Start: 2024-10-17

## 2024-10-17 RX ORDER — LOPERAMIDE HYDROCHLORIDE 2 MG/1
2 CAPSULE ORAL
Status: CANCELLED | OUTPATIENT
Start: 2024-10-17

## 2024-10-17 RX ORDER — KETOROLAC TROMETHAMINE 30 MG/ML
30 INJECTION, SOLUTION INTRAMUSCULAR; INTRAVENOUS
Status: CANCELLED | OUTPATIENT
Start: 2024-10-17

## 2024-10-17 RX ORDER — METOCLOPRAMIDE 10 MG/1
10 TABLET ORAL EVERY 6 HOURS PRN
Status: CANCELLED | OUTPATIENT
Start: 2024-10-17

## 2024-10-17 RX ORDER — OXYTOCIN 10 [USP'U]/ML
10 INJECTION, SOLUTION INTRAMUSCULAR; INTRAVENOUS
Status: CANCELLED | OUTPATIENT
Start: 2024-10-17

## 2024-10-17 RX ORDER — OXYTOCIN/0.9 % SODIUM CHLORIDE 30/500 ML
340 PLASTIC BAG, INJECTION (ML) INTRAVENOUS CONTINUOUS PRN
Status: CANCELLED | OUTPATIENT
Start: 2024-10-17

## 2024-10-17 RX ORDER — OXYTOCIN/0.9 % SODIUM CHLORIDE 30/500 ML
100-340 PLASTIC BAG, INJECTION (ML) INTRAVENOUS CONTINUOUS PRN
Status: CANCELLED | OUTPATIENT
Start: 2024-10-17

## 2024-10-17 RX ORDER — ONDANSETRON 2 MG/ML
4 INJECTION INTRAMUSCULAR; INTRAVENOUS EVERY 6 HOURS PRN
Status: CANCELLED | OUTPATIENT
Start: 2024-10-17

## 2024-10-17 RX ORDER — SODIUM CHLORIDE, SODIUM LACTATE, POTASSIUM CHLORIDE, CALCIUM CHLORIDE 600; 310; 30; 20 MG/100ML; MG/100ML; MG/100ML; MG/100ML
INJECTION, SOLUTION INTRAVENOUS CONTINUOUS PRN
Status: CANCELLED | OUTPATIENT
Start: 2024-10-17

## 2024-10-17 RX ORDER — METHYLERGONOVINE MALEATE 0.2 MG/ML
200 INJECTION INTRAVENOUS
Status: CANCELLED | OUTPATIENT
Start: 2024-10-17

## 2024-10-17 RX ORDER — OXYTOCIN/0.9 % SODIUM CHLORIDE 30/500 ML
1-24 PLASTIC BAG, INJECTION (ML) INTRAVENOUS CONTINUOUS
Status: CANCELLED | OUTPATIENT
Start: 2024-10-17

## 2024-10-17 RX ORDER — PROCHLORPERAZINE MALEATE 10 MG
10 TABLET ORAL EVERY 6 HOURS PRN
Status: CANCELLED | OUTPATIENT
Start: 2024-10-17

## 2024-10-17 RX ORDER — CITRIC ACID/SODIUM CITRATE 334-500MG
30 SOLUTION, ORAL ORAL
Status: CANCELLED | OUTPATIENT
Start: 2024-10-17

## 2024-10-17 RX ORDER — MISOPROSTOL 100 UG/1
400 TABLET ORAL
Status: CANCELLED | OUTPATIENT
Start: 2024-10-17

## 2024-10-17 RX ORDER — CARBOPROST TROMETHAMINE 250 UG/ML
250 INJECTION, SOLUTION INTRAMUSCULAR
Status: CANCELLED | OUTPATIENT
Start: 2024-10-17

## 2024-10-17 RX ORDER — METOCLOPRAMIDE HYDROCHLORIDE 5 MG/ML
10 INJECTION INTRAMUSCULAR; INTRAVENOUS EVERY 6 HOURS PRN
Status: CANCELLED | OUTPATIENT
Start: 2024-10-17

## 2024-10-17 RX ORDER — TRANEXAMIC ACID 10 MG/ML
1 INJECTION, SOLUTION INTRAVENOUS EVERY 30 MIN PRN
Status: CANCELLED | OUTPATIENT
Start: 2024-10-17

## 2024-10-17 ASSESSMENT — PAIN SCALES - GENERAL: PAINLEVEL: NO PAIN (0)

## 2024-10-17 NOTE — PROGRESS NOTES
Pt presents to clinic today for prenatal care 37w3d. Pt denies any bleeding, or leakage of fluid at this time. States baby has not been as moving good the past couple days. Reports some movement, just has not been as consistent as usual. Patient denies contractions.      Brennan Malik LPN...........................10/17/2024 1:35 PM

## 2024-10-17 NOTE — PROGRESS NOTES
Return OB Visit    S: Pat is feeling well. No ctx, VB or LOF. +FM. The pain from a few days ago has resolved. Fasting glucoses low 90s. 1 hr PP all <140    O: /74   Pulse 66   Wt 89.8 kg (198 lb)   LMP 2024 (Approximate)   BMI 31.96 kg/m    Gen: Well-appearing, NAD  See OB Flowsheet    BPP   NST: 145, mod variability, + accels, no decels  [place]: quiet    A/P:  Pat Mccabe is a 38 year old  at 37w3d by 8w3d US, here for return OB visit.  Hx of spontaneous  delivery x2, last pregnancy was term (37w5d)  - normal serial cervical lengths     AMA  - on ASA 81mg  - Growth US 10/10/2024 EFW 3340g (88%ile, AC >98%ile)  - needs weekly NST starting 36 weeks  - elevated ALT on initial HELLP labs, resolved on recheck. Otherwise normal baseline labs     Palpitations:  - s/p ZIO patch with predominantly NSR  - normal TSH     GDMA2  - lantus to 12U nightly.   - weekly BPPs      PNC: Rh positive, Rubella immune, GBS negative  Genetics: declines  Imaging: dating US at 8w3d, level 2 US normal  Immunizations: declines Tdap. RSV 10/10/2024   RTC weekly until delivery    IOL scheduled for 10/29 for GDMA2, AMA at 39w1d    Mikayla Shen MD FACOG  OB/GYN  10/17/2024 1:33 PM

## 2024-10-22 ENCOUNTER — PRENATAL OFFICE VISIT (OUTPATIENT)
Dept: OBGYN | Facility: OTHER | Age: 39
End: 2024-10-22
Attending: NURSE PRACTITIONER
Payer: COMMERCIAL

## 2024-10-22 ENCOUNTER — HOSPITAL ENCOUNTER (OUTPATIENT)
Dept: ULTRASOUND IMAGING | Facility: OTHER | Age: 39
Discharge: HOME | End: 2024-10-22
Attending: OBSTETRICS & GYNECOLOGY
Payer: COMMERCIAL

## 2024-10-22 VITALS
WEIGHT: 199 LBS | BODY MASS INDEX: 32.12 KG/M2 | HEART RATE: 82 BPM | DIASTOLIC BLOOD PRESSURE: 62 MMHG | SYSTOLIC BLOOD PRESSURE: 110 MMHG

## 2024-10-22 DIAGNOSIS — O24.419 GDM, CLASS A2: ICD-10-CM

## 2024-10-22 DIAGNOSIS — O09.521 ENCOUNTER FOR SUPERVISION OF MULTIGRAVIDA OF ADVANCED MATERNAL AGE IN FIRST TRIMESTER: ICD-10-CM

## 2024-10-22 DIAGNOSIS — O09.523 MULTIGRAVIDA OF ADVANCED MATERNAL AGE IN THIRD TRIMESTER: ICD-10-CM

## 2024-10-22 DIAGNOSIS — O09.529 ANTEPARTUM MULTIGRAVIDA OF ADVANCED MATERNAL AGE: Primary | ICD-10-CM

## 2024-10-22 PROCEDURE — 99207 PR OB VISIT-NO CHARGE - GICH ONLY: CPT | Performed by: NURSE PRACTITIONER

## 2024-10-22 PROCEDURE — 76819 FETAL BIOPHYS PROFIL W/O NST: CPT

## 2024-10-22 NOTE — PROGRESS NOTES
Chief Complaint: Recheck OB visit at 38w1d    HPI: Pat Mccabe presents for a routine OB visit now at 38w1d.  Patient feels ready for baby.  Her bags are packed.  She is not having much for [place] contractions.  The abdominal pain patient was experiencing last week has since resolved.    Patient notices normal fetal movement- movement is still frequent through the hour, but seems slightly decreased. Denies contractions, vaginal bleeding or leaking of fluid.    O: /62   Pulse 82   Wt 90.3 kg (199 lb)   LMP 02/02/2024 (Approximate)   BMI 32.12 kg/m    Body mass index is 32.12 kg/m .  See OB flow sheet  EXAM:  General: Alert, oriented, appropriate    Cervical Check: 3 cm, 50% effaced     FH:39 cm  FHT:143 bpm per BPP today    BPP today:    Results for orders placed or performed during the hospital encounter of 10/22/24   US Fetal Biophys Prof w/o Non Stress Test     Status: None    Narrative    US OB FETAL BIOPHY PROFILE W/O NST SINGLE W/LTD    Exam reason: Multigravida of advanced maternal age in third trimester;  GDM, class A2    Comparison: 10/17/2024    Fetal Movement:  Score 2: At least 3 discrete body/limb movements in 30 minutes  Score 0: Up to 2 episodes of limb/body movements in 30 minutes                    FM = 2    Fetal Breathing movements:  Score 2: At least one episode, at least 30 seconds duration in 30  minutes of observation.  Score 0: Absent or no episodes of greater than 30 seconds    duration in 30 minutes observation.                    FBM = 2    Fetal Tone:  Score 2: At least one episode of active extension with return to     flexion of fetal limbs or trunk, opening and closing of     hands considered normal tone.  Score 0: Absent or no episodes in 30 minutes of observation.                    FT = 2    Amniotic Fluid Volume:  Score 2: At least one pocket of amniotic fluid measuring at least    2 cm in two perpendicular planes.  Score 0: Either no amniotic fluid or a  pocket less than 2 cm in    two perpendicular planes.                    AF = 2                        TOTAL =     MVP: 3.8 cm  HRT Rate: 143 bpm  Placenta Location: Posterior fundal  Placenta ndGndrndanddndend:nd nd2nd Position: Cephalic  Maternal uterus and adnexa: Normal where visualized.      Impression    IMPRESSION:    Biophysical profile score .    DWAINE BARRAGAN MD         SYSTEM ID:  [id number]       ASSESSMENT/PLAN:     Pat Mccabe is a 38 year old  at 38w1d by 8w3d US, here for return OB visit.  Is doing well.  Denies any contractions.  No leaking of fluid.    #Hx of spontaneous  delivery x2, last pregnancy was term (37w5d)  - normal serial cervical lengths     #AMA  - on ASA 81mg  - Growth US 10/10/2024 EFW 3340g (88%ile, AC >98%ile)  - needs weekly NST starting 36 weeks-passed BPP today   - elevated ALT on initial HELLP labs, resolved on recheck. Otherwise normal baseline labs     @Palpitations:  - s/p ZIO patch with predominantly NSR  - normal TSH     @GDMA2  - lantus to 12U nightly   - weekly BPPs     PNC: Rh positive, Rubella immune, GBS negative  Genetics: declines  Imaging: dating US at 8w3d, level 2 US normal  Immunizations: declines Tdap. RSV 10/10/2024     IOL scheduled for 10/29 for GDMA2, AMA at 39w1d, patient aware to present to the emergency department at 5:30 in the morning next Tuesday if she has not gone into spontaneous labor by then.    ISH AndersonP., R.N., APRN CNP  3:47 PM 10/22/2024

## 2024-10-22 NOTE — NURSING NOTE
Pt presents to clinic today for prenatal care 38w1d. Pt denies any contractions, bleeding, or leakage of fluid at this time. States baby is moving good.    Medication Reconciliation: complete  Brooklyn Corea LPN

## 2024-10-24 ENCOUNTER — PRENATAL OFFICE VISIT (OUTPATIENT)
Dept: OBGYN | Facility: OTHER | Age: 39
End: 2024-10-24
Payer: COMMERCIAL

## 2024-10-24 VITALS
BODY MASS INDEX: 31.99 KG/M2 | WEIGHT: 198.2 LBS | SYSTOLIC BLOOD PRESSURE: 100 MMHG | DIASTOLIC BLOOD PRESSURE: 68 MMHG | HEART RATE: 72 BPM

## 2024-10-24 DIAGNOSIS — O09.529 ANTEPARTUM MULTIGRAVIDA OF ADVANCED MATERNAL AGE: Primary | ICD-10-CM

## 2024-10-24 PROCEDURE — 99207 PR OB VISIT-NO CHARGE - GICH ONLY: CPT

## 2024-10-24 ASSESSMENT — PAIN SCALES - GENERAL: PAINLEVEL_OUTOF10: NO PAIN (0)

## 2024-10-24 NOTE — PROGRESS NOTES
OB Visit    S: Patient is feeling well. Denies LOF, VB, or regular Ctx. + FM.    Concerns: Patient that that he was having decreased fetal movement however since she started doing kick counts she notes that baby is passing with 0 concerns.  Baby is back to the normal movement patterns now that she has pain attention.    O: /68   Pulse 72   Wt 89.9 kg (198 lb 3.2 oz)   LMP 2024 (Approximate)   BMI 31.99 kg/m    Gen: Well-appearing, NAD  FHT: 142  CX: 3-3.5/60/-3      A/P:  Pat Mccabe is a 38 year old  at 38w3d here for what she thought was decreased fetal movement however this is not the case as she began doing kick counts and paid more attention to baby's movement and noted that baby was moving its regular amount. She declines Nst as movenment is better. She was reassured by this.  She was going to cancel the appointment however notes that she is wanting her cervix checked and strep today.  This is done for patient.  Small bleeding noted on glove after check.  Patient is aware of strict return precautions with any leaking of fluid, extreme bleeding, contractions less than 10 minutes apart for greater than 1 hour or decreased fetal movement.  She voices understanding and agreement this plan.          KAMINI Hyatt-RINA  10/24/2024 11:14 AM

## 2024-10-24 NOTE — PROGRESS NOTES
Pt presents to clinic today for prenatal care 38w3d. Pt denies any bleeding, or leakage of fluid at this time. States baby is moving good. Patient reported decreased fetal movement this morning but since  receiving information about fetal kick counts this morning, baby has been exceeding kick counts. Patient denies contractions.     Brennan Malik LPN...........................10/24/2024 10:59 AM

## 2024-10-28 ENCOUNTER — MYC MEDICAL ADVICE (OUTPATIENT)
Dept: OBGYN | Facility: OTHER | Age: 39
End: 2024-10-28
Payer: COMMERCIAL

## 2024-10-29 ENCOUNTER — HOSPITAL ENCOUNTER (INPATIENT)
Facility: OTHER | Age: 39
LOS: 1 days | Discharge: HOME | End: 2024-10-30
Attending: OBSTETRICS & GYNECOLOGY | Admitting: OBSTETRICS & GYNECOLOGY
Payer: COMMERCIAL

## 2024-10-29 DIAGNOSIS — O09.523 MULTIGRAVIDA OF ADVANCED MATERNAL AGE IN THIRD TRIMESTER: ICD-10-CM

## 2024-10-29 DIAGNOSIS — O24.419 GDM, CLASS A2: ICD-10-CM

## 2024-10-29 LAB
ABO/RH(D): NORMAL
ANTIBODY SCREEN: NEGATIVE
ERYTHROCYTE [DISTWIDTH] IN BLOOD BY AUTOMATED COUNT: 13.2 % (ref 10–15)
GLUCOSE BLDC GLUCOMTR-MCNC: 101 MG/DL (ref 70–99)
GLUCOSE BLDC GLUCOMTR-MCNC: 76 MG/DL (ref 70–99)
GLUCOSE BLDC GLUCOMTR-MCNC: 82 MG/DL (ref 70–99)
GLUCOSE BLDC GLUCOMTR-MCNC: 85 MG/DL (ref 70–99)
GLUCOSE SERPL-MCNC: 124 MG/DL (ref 70–99)
HCT VFR BLD AUTO: 38.7 % (ref 35–47)
HGB BLD-MCNC: 12.9 G/DL (ref 11.7–15.7)
MCH RBC QN AUTO: 30.2 PG (ref 26.5–33)
MCHC RBC AUTO-ENTMCNC: 33.3 G/DL (ref 31.5–36.5)
MCV RBC AUTO: 91 FL (ref 78–100)
PLATELET # BLD AUTO: 169 10E3/UL (ref 150–450)
RBC # BLD AUTO: 4.27 10E6/UL (ref 3.8–5.2)
SPECIMEN EXPIRATION DATE: NORMAL
WBC # BLD AUTO: 8.7 10E3/UL (ref 4–11)

## 2024-10-29 PROCEDURE — 258N000003 HC RX IP 258 OP 636: Performed by: OBSTETRICS & GYNECOLOGY

## 2024-10-29 PROCEDURE — 250N000009 HC RX 250: Performed by: OBSTETRICS & GYNECOLOGY

## 2024-10-29 PROCEDURE — 250N000013 HC RX MED GY IP 250 OP 250 PS 637: Performed by: OBSTETRICS & GYNECOLOGY

## 2024-10-29 PROCEDURE — 85014 HEMATOCRIT: CPT | Performed by: OBSTETRICS & GYNECOLOGY

## 2024-10-29 PROCEDURE — 82947 ASSAY GLUCOSE BLOOD QUANT: CPT | Performed by: OBSTETRICS & GYNECOLOGY

## 2024-10-29 PROCEDURE — 86900 BLOOD TYPING SEROLOGIC ABO: CPT | Performed by: OBSTETRICS & GYNECOLOGY

## 2024-10-29 PROCEDURE — 10907ZC DRAINAGE OF AMNIOTIC FLUID, THERAPEUTIC FROM PRODUCTS OF CONCEPTION, VIA NATURAL OR ARTIFICIAL OPENING: ICD-10-PCS | Performed by: OBSTETRICS & GYNECOLOGY

## 2024-10-29 PROCEDURE — 86780 TREPONEMA PALLIDUM: CPT | Performed by: OBSTETRICS & GYNECOLOGY

## 2024-10-29 PROCEDURE — 86901 BLOOD TYPING SEROLOGIC RH(D): CPT | Performed by: OBSTETRICS & GYNECOLOGY

## 2024-10-29 PROCEDURE — 59400 OBSTETRICAL CARE: CPT | Performed by: OBSTETRICS & GYNECOLOGY

## 2024-10-29 PROCEDURE — 722N000001 HC LABOR CARE VAGINAL DELIVERY SINGLE

## 2024-10-29 PROCEDURE — 120N000001 HC R&B MED SURG/OB

## 2024-10-29 RX ORDER — LOPERAMIDE HYDROCHLORIDE 2 MG/1
4 CAPSULE ORAL
Status: DISCONTINUED | OUTPATIENT
Start: 2024-10-29 | End: 2024-10-29 | Stop reason: HOSPADM

## 2024-10-29 RX ORDER — TERBUTALINE SULFATE 1 MG/ML
0.25 INJECTION, SOLUTION SUBCUTANEOUS
Status: DISCONTINUED | OUTPATIENT
Start: 2024-10-29 | End: 2024-10-29 | Stop reason: HOSPADM

## 2024-10-29 RX ORDER — OXYTOCIN 10 [USP'U]/ML
10 INJECTION, SOLUTION INTRAMUSCULAR; INTRAVENOUS
Status: DISCONTINUED | OUTPATIENT
Start: 2024-10-29 | End: 2024-10-29 | Stop reason: HOSPADM

## 2024-10-29 RX ORDER — MISOPROSTOL 100 UG/1
400 TABLET ORAL
Status: DISCONTINUED | OUTPATIENT
Start: 2024-10-29 | End: 2024-10-30 | Stop reason: HOSPADM

## 2024-10-29 RX ORDER — LIDOCAINE 40 MG/G
CREAM TOPICAL
Status: DISCONTINUED | OUTPATIENT
Start: 2024-10-29 | End: 2024-10-29 | Stop reason: HOSPADM

## 2024-10-29 RX ORDER — KETOROLAC TROMETHAMINE 30 MG/ML
30 INJECTION, SOLUTION INTRAMUSCULAR; INTRAVENOUS
Status: COMPLETED | OUTPATIENT
Start: 2024-10-29 | End: 2024-10-30

## 2024-10-29 RX ORDER — OXYTOCIN/0.9 % SODIUM CHLORIDE 30/500 ML
340 PLASTIC BAG, INJECTION (ML) INTRAVENOUS CONTINUOUS PRN
Status: DISCONTINUED | OUTPATIENT
Start: 2024-10-29 | End: 2024-10-29 | Stop reason: HOSPADM

## 2024-10-29 RX ORDER — OXYTOCIN/0.9 % SODIUM CHLORIDE 30/500 ML
1-24 PLASTIC BAG, INJECTION (ML) INTRAVENOUS CONTINUOUS
Status: DISCONTINUED | OUTPATIENT
Start: 2024-10-29 | End: 2024-10-29 | Stop reason: HOSPADM

## 2024-10-29 RX ORDER — NALOXONE HYDROCHLORIDE 0.4 MG/ML
0.2 INJECTION, SOLUTION INTRAMUSCULAR; INTRAVENOUS; SUBCUTANEOUS
Status: DISCONTINUED | OUTPATIENT
Start: 2024-10-29 | End: 2024-10-29 | Stop reason: HOSPADM

## 2024-10-29 RX ORDER — MODIFIED LANOLIN
OINTMENT (GRAM) TOPICAL
Status: DISCONTINUED | OUTPATIENT
Start: 2024-10-29 | End: 2024-10-30 | Stop reason: HOSPADM

## 2024-10-29 RX ORDER — DOCUSATE SODIUM 100 MG/1
100 CAPSULE, LIQUID FILLED ORAL DAILY
Status: DISCONTINUED | OUTPATIENT
Start: 2024-10-30 | End: 2024-10-30 | Stop reason: HOSPADM

## 2024-10-29 RX ORDER — METHYLERGONOVINE MALEATE 0.2 MG/ML
200 INJECTION INTRAVENOUS
Status: DISCONTINUED | OUTPATIENT
Start: 2024-10-29 | End: 2024-10-29 | Stop reason: HOSPADM

## 2024-10-29 RX ORDER — ONDANSETRON 4 MG/1
4 TABLET, ORALLY DISINTEGRATING ORAL EVERY 6 HOURS PRN
Status: DISCONTINUED | OUTPATIENT
Start: 2024-10-29 | End: 2024-10-29 | Stop reason: HOSPADM

## 2024-10-29 RX ORDER — OXYTOCIN/0.9 % SODIUM CHLORIDE 30/500 ML
100-340 PLASTIC BAG, INJECTION (ML) INTRAVENOUS CONTINUOUS PRN
Status: DISCONTINUED | OUTPATIENT
Start: 2024-10-29 | End: 2024-10-30 | Stop reason: HOSPADM

## 2024-10-29 RX ORDER — LOPERAMIDE HYDROCHLORIDE 2 MG/1
2 CAPSULE ORAL
Status: DISCONTINUED | OUTPATIENT
Start: 2024-10-29 | End: 2024-10-30 | Stop reason: HOSPADM

## 2024-10-29 RX ORDER — MISOPROSTOL 100 UG/1
400 TABLET ORAL
Status: DISCONTINUED | OUTPATIENT
Start: 2024-10-29 | End: 2024-10-29 | Stop reason: HOSPADM

## 2024-10-29 RX ORDER — HYDROCORTISONE 25 MG/G
CREAM TOPICAL 3 TIMES DAILY PRN
Status: DISCONTINUED | OUTPATIENT
Start: 2024-10-29 | End: 2024-10-30 | Stop reason: HOSPADM

## 2024-10-29 RX ORDER — OXYTOCIN/0.9 % SODIUM CHLORIDE 30/500 ML
340 PLASTIC BAG, INJECTION (ML) INTRAVENOUS CONTINUOUS PRN
Status: DISCONTINUED | OUTPATIENT
Start: 2024-10-29 | End: 2024-10-30 | Stop reason: HOSPADM

## 2024-10-29 RX ORDER — OXYTOCIN 10 [USP'U]/ML
10 INJECTION, SOLUTION INTRAMUSCULAR; INTRAVENOUS
Status: DISCONTINUED | OUTPATIENT
Start: 2024-10-29 | End: 2024-10-30 | Stop reason: HOSPADM

## 2024-10-29 RX ORDER — CITRIC ACID/SODIUM CITRATE 334-500MG
30 SOLUTION, ORAL ORAL
Status: DISCONTINUED | OUTPATIENT
Start: 2024-10-29 | End: 2024-10-29 | Stop reason: HOSPADM

## 2024-10-29 RX ORDER — IBUPROFEN 400 MG/1
800 TABLET, FILM COATED ORAL EVERY 6 HOURS PRN
Status: DISCONTINUED | OUTPATIENT
Start: 2024-10-29 | End: 2024-10-30 | Stop reason: HOSPADM

## 2024-10-29 RX ORDER — NALOXONE HYDROCHLORIDE 0.4 MG/ML
0.4 INJECTION, SOLUTION INTRAMUSCULAR; INTRAVENOUS; SUBCUTANEOUS
Status: DISCONTINUED | OUTPATIENT
Start: 2024-10-29 | End: 2024-10-29 | Stop reason: HOSPADM

## 2024-10-29 RX ORDER — ONDANSETRON 2 MG/ML
4 INJECTION INTRAMUSCULAR; INTRAVENOUS EVERY 6 HOURS PRN
Status: DISCONTINUED | OUTPATIENT
Start: 2024-10-29 | End: 2024-10-29 | Stop reason: HOSPADM

## 2024-10-29 RX ORDER — SODIUM CHLORIDE, SODIUM LACTATE, POTASSIUM CHLORIDE, CALCIUM CHLORIDE 600; 310; 30; 20 MG/100ML; MG/100ML; MG/100ML; MG/100ML
INJECTION, SOLUTION INTRAVENOUS CONTINUOUS PRN
Status: DISCONTINUED | OUTPATIENT
Start: 2024-10-29 | End: 2024-10-29 | Stop reason: HOSPADM

## 2024-10-29 RX ORDER — ACETAMINOPHEN 325 MG/1
650 TABLET ORAL EVERY 4 HOURS PRN
Status: DISCONTINUED | OUTPATIENT
Start: 2024-10-29 | End: 2024-10-30 | Stop reason: HOSPADM

## 2024-10-29 RX ORDER — PROCHLORPERAZINE MALEATE 10 MG
10 TABLET ORAL EVERY 6 HOURS PRN
Status: DISCONTINUED | OUTPATIENT
Start: 2024-10-29 | End: 2024-10-29 | Stop reason: HOSPADM

## 2024-10-29 RX ORDER — METHYLERGONOVINE MALEATE 0.2 MG/ML
200 INJECTION INTRAVENOUS
Status: DISCONTINUED | OUTPATIENT
Start: 2024-10-29 | End: 2024-10-30 | Stop reason: HOSPADM

## 2024-10-29 RX ORDER — METOCLOPRAMIDE 10 MG/1
10 TABLET ORAL EVERY 6 HOURS PRN
Status: DISCONTINUED | OUTPATIENT
Start: 2024-10-29 | End: 2024-10-29 | Stop reason: HOSPADM

## 2024-10-29 RX ORDER — SODIUM CHLORIDE, SODIUM LACTATE, POTASSIUM CHLORIDE, CALCIUM CHLORIDE 600; 310; 30; 20 MG/100ML; MG/100ML; MG/100ML; MG/100ML
INJECTION, SOLUTION INTRAVENOUS CONTINUOUS
Status: DISCONTINUED | OUTPATIENT
Start: 2024-10-29 | End: 2024-10-29 | Stop reason: HOSPADM

## 2024-10-29 RX ORDER — TRANEXAMIC ACID 10 MG/ML
1 INJECTION, SOLUTION INTRAVENOUS EVERY 30 MIN PRN
Status: DISCONTINUED | OUTPATIENT
Start: 2024-10-29 | End: 2024-10-30 | Stop reason: HOSPADM

## 2024-10-29 RX ORDER — CARBOPROST TROMETHAMINE 250 UG/ML
250 INJECTION, SOLUTION INTRAMUSCULAR
Status: DISCONTINUED | OUTPATIENT
Start: 2024-10-29 | End: 2024-10-30 | Stop reason: HOSPADM

## 2024-10-29 RX ORDER — BISACODYL 10 MG
10 SUPPOSITORY, RECTAL RECTAL DAILY PRN
Status: DISCONTINUED | OUTPATIENT
Start: 2024-10-29 | End: 2024-10-30 | Stop reason: HOSPADM

## 2024-10-29 RX ORDER — LOPERAMIDE HYDROCHLORIDE 2 MG/1
4 CAPSULE ORAL
Status: DISCONTINUED | OUTPATIENT
Start: 2024-10-29 | End: 2024-10-30 | Stop reason: HOSPADM

## 2024-10-29 RX ORDER — LOPERAMIDE HYDROCHLORIDE 2 MG/1
2 CAPSULE ORAL
Status: DISCONTINUED | OUTPATIENT
Start: 2024-10-29 | End: 2024-10-29 | Stop reason: HOSPADM

## 2024-10-29 RX ORDER — IBUPROFEN 400 MG/1
800 TABLET, FILM COATED ORAL
Status: COMPLETED | OUTPATIENT
Start: 2024-10-29 | End: 2024-10-30

## 2024-10-29 RX ORDER — CARBOPROST TROMETHAMINE 250 UG/ML
250 INJECTION, SOLUTION INTRAMUSCULAR
Status: DISCONTINUED | OUTPATIENT
Start: 2024-10-29 | End: 2024-10-29 | Stop reason: HOSPADM

## 2024-10-29 RX ORDER — METOCLOPRAMIDE HYDROCHLORIDE 5 MG/ML
10 INJECTION INTRAMUSCULAR; INTRAVENOUS EVERY 6 HOURS PRN
Status: DISCONTINUED | OUTPATIENT
Start: 2024-10-29 | End: 2024-10-29 | Stop reason: HOSPADM

## 2024-10-29 RX ORDER — TRANEXAMIC ACID 10 MG/ML
1 INJECTION, SOLUTION INTRAVENOUS EVERY 30 MIN PRN
Status: DISCONTINUED | OUTPATIENT
Start: 2024-10-29 | End: 2024-10-29 | Stop reason: HOSPADM

## 2024-10-29 RX ADMIN — IBUPROFEN 800 MG: 400 TABLET, FILM COATED ORAL at 20:54

## 2024-10-29 RX ADMIN — Medication 340 ML/HR: at 11:25

## 2024-10-29 RX ADMIN — SODIUM CHLORIDE, POTASSIUM CHLORIDE, SODIUM LACTATE AND CALCIUM CHLORIDE: 600; 310; 30; 20 INJECTION, SOLUTION INTRAVENOUS at 06:38

## 2024-10-29 RX ADMIN — Medication 2 MILLI-UNITS/MIN: at 07:30

## 2024-10-29 RX ADMIN — Medication 2 MILLI-UNITS/MIN: at 06:01

## 2024-10-29 RX ADMIN — LIDOCAINE HYDROCHLORIDE 20 ML: 10 INJECTION, SOLUTION INFILTRATION; PERINEURAL at 11:48

## 2024-10-29 RX ADMIN — SODIUM CHLORIDE, POTASSIUM CHLORIDE, SODIUM LACTATE AND CALCIUM CHLORIDE 1000 ML: 600; 310; 30; 20 INJECTION, SOLUTION INTRAVENOUS at 11:13

## 2024-10-29 ASSESSMENT — ACTIVITIES OF DAILY LIVING (ADL)
ADLS_ACUITY_SCORE: 0

## 2024-10-29 NOTE — H&P
[facility] Labor and Delivery History and Physical    Pat Mccabe MRN# [id number]   Age: 38 year old YOB: 1985     Date of Admission:  10/29/2024    Primary care provider: Renetta Fletcher           Chief Complaint:   Pat Mccabe is a 38 year old  at 39w1d by 8w3d US admitted for IOL for AMA, GDMA2.  She reports increased discharge the last few days but no large gush or steady trickle. No VB. +FM          Pregnancy history:     OBSTETRIC HISTORY:    OB History    Para Term  AB Living   6 3 1 2 2 3   SAB IAB Ectopic Multiple Live Births   2 0 0 0 3      # Outcome Date GA Lbr Tushar/2nd Weight Sex Type Anes PTL Lv   6 Current            5 Term 10/11/21 37w5d 03:00 / 00:06 3.731 kg (8 lb 3.6 oz) M Vag-Spont None N RODRIGO      Name: RAZAMALE-PAT      Apgar1: 8  Apgar5: 9   4  11/02/15 36w5d  3.595 kg (7 lb 14.8 oz) M Vag-Spont  N RODRIGO      Apgar1: 9  Apgar5: 10   3 SAB 02/01/15        FD   2 SAB 09/15/14        FD   1  13 35w0d 10:00 / 00:03 3.374 kg (7 lb 7 oz) F Vag-Vacuum   RODRIGO      Birth Comments: System Generated. Please review and update pregnancy details.      Name: Jacquie Mccabe       EDC: Estimated Date of Delivery: 2024    Prenatal Labs:   Lab Results   Component Value Date    ABO O 2021    RH Pos 2021    AS Negative 10/29/2024    HEPBANG Nonreactive 2024    HGB 12.9 10/29/2024       GBS Status:   negative    Active Problem List  Patient Active Problem List   Diagnosis    Encounter for triage in pregnant patient    Normal labor and delivery    Irregular menstrual cycle    GDM, class A2    Multigravida of advanced maternal age in third trimester       Medication Prior to Admission  Medications Prior to Admission   Medication Sig Dispense Refill Last Dose/Taking    alcohol swab prep pads Use to swab area of injection/amira as directed. 100 each 3 10/29/2024 Morning    aspirin (ASA)  81 MG chewable tablet Take 1 tablet (81 mg) by mouth daily   10/28/2024    blood glucose (NO BRAND SPECIFIED) test strip Use to test blood sugar 4 times daily or as directed. To accompany: Blood Glucose Monitor Brands: per insurance. 400 strip 3 10/29/2024 Morning    blood glucose monitoring (NO BRAND SPECIFIED) meter device kit Use to test blood sugar 4 times daily or as directed. Preferred blood glucose meter OR supplies to accompany: Blood Glucose Monitor Brands: per insurance. 1 kit 0 10/29/2024 Morning    clindamycin (CLINDAMAX) 1 % external gel Apply topically daily 60 g 5 More than a month    insulin glargine (LANTUS SOLOSTAR) 100 UNIT/ML pen Inject 10 Units subcutaneously at bedtime. 15 mL 1 10/28/2024    insulin pen needle (ULTICARE MICRO) 32G X 4 MM miscellaneous Use 1 pen needles daily or as directed. 100 each 3 10/29/2024 Morning    metoclopramide (REGLAN) 5 MG tablet Take 2 tablets (10 mg) by mouth 3 times daily as needed (nausea) 60 tablet 1 More than a month    Prenatal Vit-Fe Fumarate-FA (PRENATAL VITAMIN AND MINERAL PO)    10/28/2024    thin (NO BRAND SPECIFIED) lancets Use with lanceting device. To accompany: Blood Glucose Monitor Brands: per insurance. 400 each 3 10/28/2024   .        Maternal Past Medical History:     Past Medical History:   Diagnosis Date    Syncope and collapse     2011,with abd pain, (-) workup    Varicella      Past Surgical History:   Procedure Laterality Date    COLONOSCOPY      2011    LAPAROSCOPIC CHOLECYSTECTOMY      1/2/14,Cholecystectomy,Laparoscopic                       Family History:     Family History   Problem Relation Age of Onset    No Known Problems Mother     No Known Problems Father     Cardiovascular Paternal Grandfather     No Known Problems Brother     No Known Problems Sister         Uterine rupture     Family history reviewed and updated in [facility]            Social History:     Social History     Tobacco Use    Smoking status: Former     Current  "packs/day: 0.00     Types: Cigarettes     Quit date: 2013     Years since quitting: 10.9    Smokeless tobacco: Never    Tobacco comments:     Quit 3 years    Substance Use Topics    Alcohol use: Not Currently     Alcohol/week: 0.0 standard drinks of alcohol     Comment: occasional, 3 per week             Review of Systems:   The Review of Systems is negative other than noted in the HPI          Physical Exam:   Patient Vitals for the past 8 hrs:   BP Temp Temp src Resp SpO2 Height Weight   10/29/24 0733 120/81 -- -- -- 95 % -- --   10/29/24 0703 109/68 97.7  F (36.5  C) -- -- 99 % -- --   10/29/24 0651 117/77 -- -- -- -- -- --   10/29/24 0648 -- -- -- -- 99 % -- --   10/29/24 0543 109/68 97.5  F (36.4  C) Temporal 16 100 % 1.676 m (5' 6\") 89.8 kg (198 lb)     Gen: resting in bed, NAD  CV: RRR  Resp: CTAB  Abd: gravid, soft, nontender  Ext: nontender    Cervix: 4-5/75/high per RN  Membranes: suspected prolonged PROM. Watery discharge with cervical exam  EFW: 8.5 lbs  Presentation:Cephalic by BSUS. Noted scant remaining amniotic fluid    Fetal Heart Rate Tracin, mod variability, + accels. One decel at 0645, lasting 4 minutes, spontaneous recovery  Tocometer: irritability        Assessment:   Pat Mccabe is a 38 year old  at 39w1d by 8w3d US admitted for IOL for AMA, GDMA2.          Plan:   GDMA2: glucose checks and insulin gtt per protocol  FWB: Cat I currently and reactive. EFW 8.5 lbs  Labor: suspected prolonged ROM without evidence of III. On pitocin augmentation per protocol  Rh positive, RI, GBS negative  Pain: per patient request  Anticipate       Mikayla Shen MD       "

## 2024-10-29 NOTE — PROGRESS NOTES
Up to bathroom, voided W/O difficulty, tolerated activity well. Abigail care discussed with patient. Settled back in bed.

## 2024-10-29 NOTE — PLAN OF CARE
Patient bonding well with baby.  Breastfeeding is going well upon assessment and per patient report.  Patient denies current questions or concerns. Continue to monitor and assess patient status.

## 2024-10-29 NOTE — PROGRESS NOTES
"Intrapartum Progress Note    S: Patient is still comfortable.     O: /70   Temp 97.5  F (36.4  C) (Temporal)   Resp 16   Ht 1.676 m (5' 6\")   Wt 89.8 kg (198 lb)   LMP 2024 (Approximate)   SpO2 95%   BMI 31.96 kg/m     Gen: resting  Cvx: 5/80/-3    FHT: 145, mod variability, + accels, no decels  [place]: 4-5 contractions in 10 min    Membranes: suspected prolonged ROM, AROM of forebag- small clear fluid    A/P: 38 year old  at 39w1d by 8w3d US admitted for IOL for AMA, GDMA2  GDMA2: glucose checks and insulin gtt per protocol  FWB: Cat I and reactive. EFW 8.5 lbs  Labor: suspected prolonged ROM without evidence of III. On pitocin augmentation per protocol  Rh positive, RI, GBS negative  Pain: per patient request  Anticipate     Mikayla Shen MD 10:43 AM    "

## 2024-10-29 NOTE — L&D DELIVERY NOTE
OB Vaginal Delivery Note    Pat Mccabe MRN# [id number]   Age: 38 year old YOB: 1985       GA: 39w1d  GP:   Labor Complications: None  EBL:   mL  Delivery QBL: 146 mL  Delivery Type: Vaginal, Spontaneous  ROM to Delivery Time: rupture date or rupture time have not been documented   Weight: 3.926 kg (8 lb 10.5 oz)   1 Minute 5 Minute 10 Minute   Apgar Totals: 9   9        BRITTANY CONTRERAS;LETICIA ROMERO;MALINA HUTCHISON    Delivery Details:  Pat Mccabe, a 38 year old  who was admitted at 39w1d for IOL for AMA and GDMA2. She was 4-5 cm upon admission with suspected prolonged PROM. She was started on pitocin augmentation. At 5cm, she underwent AROM of a small forebag and progressed to complete dilation over the next 40 minutes. She precipitously delivered a female infant, OA position, without complication. Apgars 9/9. Weight 3926g. Placenta delivered spontaneously and appeared intact. Small first degree perineal laceration became hemostatic with pressure. .     Mikayla Shen MD FACOG  OB/GYN  10/29/2024 1:03 PM         Varinder Mccabe-Pat [[id number]]      Labor Event Times      Latent labor onset date/time: 10/29/2024 0601    Active labor onset date: 10/29/24 Onset time:  7:47 AM   Dilation complete date: 10/29/24 Complete time: 11:23 AM   Start pushing date/time: 10/29/2024 1123          Labor Length      1st Stage (hrs): 3 (min): 36   2nd Stage (hrs): 0 (min): 0   3rd Stage (hrs): 0 (min): 3          Labor Events     labor?: No   steroids: None  Labor Type: Induction/Cervical ripening, AROM  Predominate monitoring during 1st stage: continuous electronic fetal monitoring     Antibiotics received during labor?: No       Rupture date/time: 10/29/24 1042   Rupture type: Artificial Rupture of Membranes  Fluid color: Clear  Fluid odor: Normal     Induction: Oxytocin, AROM  Induction date/time: 10/29/24 06    Cervical ripening  "date/time:      Indications for induction: Diabetes (Comment to specify)       Delivery/Placenta Date and Time      Delivery Date: 10/29/24 Delivery Time: 11:23 AM   Placenta Date/Time: 10/29/2024 11:26 AM  Oxytocin given at the time of delivery: after delivery of baby  Delivering clinician: Mikayla Shen MD   Other personnel present at delivery:  Provider Role   Mer Mariscal RN Registered Nurse   Sofia Ashby RN Registered Nurse   Molly Cotto RN Registered Nurse             Vaginal Counts       Initial count performed by 2 team members:  Two Team Members   Sofia Cohn NST         Needles Suture Needles Sponges (RETIRED) Instruments   Initial counts 1 0 6    Added to count 0 0 0    Relief counts       Final counts               Placed during labor Accounted for at the end of labor   FSE No NA   IUPC No NA   Cervidil No NA                  Final count performed by 2 team members:  Two Team Members   Sofia Shen      Final count correct?: Yes  Pre-Birth Team Brief: Complete  Post-Birth Team Debrief: Complete       Apgars    Living status: Living   1 Minute 5 Minute 10 Minute 15 Minute 20 Minute   Skin color: 1  1       Heart rate: 2  2       Reflex irritability: 2  2       Muscle tone: 2  2       Respiratory effort: 2  2       Total: 9  9       Apgars assigned by: SOFIA PEREA       Cord      Vessels: 3 Vessels    Cord Complications: None               Cord Blood Disposition: Lab    Gases Sent?: No    Delayed cord clamping?: Yes    Cord Clamping Delay (seconds):  seconds    Stem cell collection?: No            Resuscitation    Methods: Temp Skin Control  [place] Care at Delivery: Baby born vaginally at 1123.  Vigorous. Lusty Cry.  Pink. Dried and stimulated while skin to skin with mom. Cord Clamped after 1 minute.  Stable condition.         [place] Measurements      Weight: 8 lb 10.5 oz Length: 1' 8\"     Head circumference: 35.6 cm Chest circumference: 38.1 cm      "     Skin to Skin and Feeding Plan      Skin to skin initiation date/time: 1/10/1841    Skin to skin with: Mother  Skin to skin end date/time: 1/10/1841    Breastfeeding initiated date/time: 10/29/2024 1130       Labor Events and Shoulder Dystocia    Fetal Tracing Prior to Delivery: Category 1  Shoulder dystocia present?: Neg       Delivery (Maternal) (Provider to Complete) ([id number])    Episiotomy: None  Perineal lacerations: 1st Repaired?: No   Repair suture: None  Genital tract inspection done: Pos       Blood Loss  Mother: Pat Mccabe Adwoa #[id number]     Start of Mother's Information      Delivery Blood Loss   Intrapartum & Postpartum: 10/29/24 0747 - 10/30/24 0629    Delivery Admission: 10/29/24 0535 - 10/30/24 0629         Intrapartum & Postpartum Delivery Admission    Delivery QBL (mL) Hospital Encounter 146 mL 146 mL    Postpartum QBL (mL) Hospital Encounter 73 mL 73 mL    Total  219 mL 219 mL               End of Mother's Information  Mother: Pat Mccabe Adwoa #[id number]                Delivery - Provider to Complete ([id number])    Delivering clinician: Mikayla Shen MD  Delivery Type (Choose the 1 that will go to the Birth History): Vaginal, Spontaneous                         Other personnel:  Provider Role   Mer Mariscal RN Registered Nurse   Sofia Ashby RN Registered Nurse   Molly Cotto RN Registered Nurse                    Placenta    Date/Time: 10/29/2024 11:26 AM  Removal: Spontaneous  Disposition: Hospital disposal             Anesthesia    Method: None                    Presentation and Position    Presentation: Vertex    Position: Middle Occiput Anterior                     Mikayla Shen MD

## 2024-10-29 NOTE — PROGRESS NOTES
of a living female, lusty cry, lungs clearing. To Moms chest for bonding. Mom and Dad pleased with new baby. ID band #[id number] applied to Babe and Parents.

## 2024-10-29 NOTE — PROGRESS NOTES
Called Dr. Shen regarding FM strip. On admit  baseline 140's with accelerations present and no decelerations noted.     Pitocin infusion started at 0601 per MD order.    At 0645,  with 5 minute prolonged deceleration into the 100's. Patient placed into exaggerated left side lying position. Pitocin stopped at 0647. Fluid bolus initiated. Assisted patient into exaggerated right side lying position and resolved at 0650. Immediate baseline change to 165 with accelerations present.     Dr. Shen to report to [facility] for assessment at this time. Okay to keep Pitocin off at this time and await for MD assessment.     Devonte Stuart RN 10/29/24 7:18 AM

## 2024-10-29 NOTE — PROGRESS NOTES
Dr. Meliza Shen at bedside exam completed, bedside US done. IV pitocin started at 2 MU per Doctor order. Will continue to closely monitor.

## 2024-10-29 NOTE — PROGRESS NOTES
Admission Note    Data:  Pat Mccabe admitted to 404 from home at 0536.      Action:  Dr. Shen has been notified of admission. Pt oriented to unit, call light in reach.     Response:  Patient is here for her scheduled IOL for AMA and GDMA2.    Devonte Stuart RN 10/29/24 5:37 AM

## 2024-10-30 VITALS
DIASTOLIC BLOOD PRESSURE: 57 MMHG | BODY MASS INDEX: 29.43 KG/M2 | HEART RATE: 64 BPM | SYSTOLIC BLOOD PRESSURE: 103 MMHG | HEIGHT: 66 IN | OXYGEN SATURATION: 98 % | TEMPERATURE: 97 F | WEIGHT: 183.1 LBS | RESPIRATION RATE: 16 BRPM

## 2024-10-30 LAB
GLUCOSE SERPL-MCNC: 83 MG/DL (ref 70–99)
HGB BLD-MCNC: 12.8 G/DL (ref 11.7–15.7)
T PALLIDUM AB SER QL: NONREACTIVE

## 2024-10-30 PROCEDURE — 82947 ASSAY GLUCOSE BLOOD QUANT: CPT | Performed by: OBSTETRICS & GYNECOLOGY

## 2024-10-30 PROCEDURE — 85018 HEMOGLOBIN: CPT | Performed by: OBSTETRICS & GYNECOLOGY

## 2024-10-30 PROCEDURE — 36415 COLL VENOUS BLD VENIPUNCTURE: CPT | Performed by: OBSTETRICS & GYNECOLOGY

## 2024-10-30 PROCEDURE — 250N000013 HC RX MED GY IP 250 OP 250 PS 637: Performed by: OBSTETRICS & GYNECOLOGY

## 2024-10-30 RX ADMIN — IBUPROFEN 800 MG: 400 TABLET, FILM COATED ORAL at 11:12

## 2024-10-30 ASSESSMENT — ACTIVITIES OF DAILY LIVING (ADL)
ADLS_ACUITY_SCORE: 0

## 2024-10-30 NOTE — PROGRESS NOTES
[facility] [facility] DISCHARGE NOTE    Patient discharged to home at 2:18 PM via ambulation. Accompanied by spouse and staff. Discharge instructions reviewed with patient, opportunity offered to ask questions. Prescriptions sent to patients preferred pharmacy. All belongings sent with patient.    Jeanette Barber RN

## 2024-10-30 NOTE — DISCHARGE SUMMARY
VAGINAL DELIVERY DISCHARGE SUMMARY    Admit date: 10/29/2024  Discharge date: 10/30/2024     Admit Dx:   - 38 year old  at 39w1d    - AMA  - GDMA2    Discharge Dx:  - Same as above, s/p       Procedures:  - Spontaneous vaginal delivery    Admit HPI:  Ms. Pat Mccabe is a   at 39w1d  who was admitted for IOL for AMA and GDMA2 on 10/29/2024. Please see her admit H&P for full details of her PMH, PSH, Meds, Allergies and exam on admit.    Hospital course:  Pat Mccabe was admitted to the hospital on 10/29/2024 for the above listed indications. She was 4-5 cm upon admission with suspected prolonged PROM. She was started on pitocin augmentation. At 5cm, she underwent AROM of a small forebag and progressed to complete dilation over the next 40 minutes. She precipitously delivered a female infant, OA position, without complication. Apgars 9/9. Weight 3926g. Placenta delivered spontaneously and appeared intact. Small first degree perineal laceration became hemostatic with pressure. .  Please see her Delivery Summary for full details regarding her delivery.    Her postpartum course was complicated by nothing. On PPD#1, she was meeting all of her postpartum goals and deemed stable for discharge. She was voiding without difficulty, tolerating a regular diet without nausea and vomiting, her pain was well controlled on oral pain medicines and her lochia was appropriate. Her hemoglobin prior to delivery was 12.9 and after delivery was 12.8. Her Rh status was positive, and Rhogam was not indicated.     Discharge Medications:  Current Discharge Medication List        CONTINUE these medications which have NOT CHANGED    Details   alcohol swab prep pads Use to swab area of injection/amira as directed.  Qty: 100 each, Refills: 3    Associated Diagnoses: GDM, class A2      blood glucose (NO BRAND SPECIFIED) test strip Use to test blood sugar 4 times daily or as directed. To accompany: Blood  Glucose Monitor Brands: per insurance.  Qty: 400 strip, Refills: 3    Associated Diagnoses: Encounter for pregnancy related examination in second trimester; Gestational diabetes      blood glucose monitoring (NO BRAND SPECIFIED) meter device kit Use to test blood sugar 4 times daily or as directed. Preferred blood glucose meter OR supplies to accompany: Blood Glucose Monitor Brands: per insurance.  Qty: 1 kit, Refills: 0    Associated Diagnoses: Encounter for pregnancy related examination in second trimester; Gestational diabetes      clindamycin (CLINDAMAX) 1 % external gel Apply topically daily  Qty: 60 g, Refills: 5    Associated Diagnoses: Cystic acne vulgaris      insulin pen needle (ULTICARE MICRO) 32G X 4 MM miscellaneous Use 1 pen needles daily or as directed.  Qty: 100 each, Refills: 3    Associated Diagnoses: GDM, class A2      metoclopramide (REGLAN) 5 MG tablet Take 2 tablets (10 mg) by mouth 3 times daily as needed (nausea)  Qty: 60 tablet, Refills: 1    Associated Diagnoses: Nausea and vomiting in pregnancy      Prenatal Vit-Fe Fumarate-FA (PRENATAL VITAMIN AND MINERAL PO)       thin (NO BRAND SPECIFIED) lancets Use with lanceting device. To accompany: Blood Glucose Monitor Brands: per insurance.  Qty: 400 each, Refills: 3    Associated Diagnoses: Encounter for pregnancy related examination in second trimester; Gestational diabetes           STOP taking these medications       aspirin (ASA) 81 MG chewable tablet Comments:   Reason for Stopping:         insulin glargine (LANTUS SOLOSTAR) 100 UNIT/ML pen Comments:   Reason for Stopping:               Discharge/Disposition:  Pta Mccabe was discharged to home in stable condition with the following instructions/medications:  1) Call for temperature > 100.4, bright red vaginal bleeding >1 pad an hour x 2 hours, foul smelling vaginal discharge, pain not controlled by usual oral pain meds, persistent nausea and vomiting not controlled on  medications  2) She will discuss contraception at PP visit  3) For feeding she decided to breastfeed.  4) She was instructed to follow-up in 6 weeks for a routine postpartum visit.      Mikayla Shen MD  OBGYN  10/30/2024 10:16 AM

## 2024-10-30 NOTE — PLAN OF CARE
Patient independent in her room. Assessment WNL. VSS. Fundus firm after slight massage, bleeding light. Taking care of baby independently. Patient has minimal complaints of back pain that was well controlled with motrin.

## 2024-10-30 NOTE — DISCHARGE INSTRUCTIONS
Warning Signs after Having a Baby    Keep this paper on your fridge or somewhere else where you can see it.    Call your provider if you have any of these symptoms up to 12 weeks after having your baby.    Thoughts of hurting yourself or your baby  Pain in your chest or trouble breathing  Severe headache not helped by pain medicine  Eyesight concerns (blurry vision, seeing spots or flashes of light, other changes to eyesight)  Fainting, shaking or other signs of a seizure    Call 9-1-1 if you feel that it is an emergency.     The symptoms below can happen to anyone after giving birth. They can be very serious. Call your provider if you have any of these warning signs.    My provider s phone number: _______________________    Losing too much blood (hemorrhage)    Call your provider if you soak through a pad in less than an hour or pass blood clots bigger than a golf ball. These may be signs that you are bleeding too much.    Blood clots in the legs or lungs    After you give birth, your body naturally clots its blood to help prevent blood loss. Sometimes this increased clotting can happen in other areas of the body, like the legs or lungs. This can block your blood flow and be very dangerous.     Call your provider if you:  Have a red, swollen spot on the back of your leg that is warm or painful when you touch it.   Are coughing up blood.     Infection    Call your provider if you have any of these symptoms:  Fever of 100.4 F (38 C) or higher.  Pain or redness around your stitches if you had an incision.   Any yellow, white, or green fluid coming from places where you had stitches or surgery.    Mood Problems (postpartum depression)    Many people feel sad or have mood changes after having a baby. But for some people, these mood swings are worse.     Call your provider right away if you feel so anxious or nervous that you can't care for yourself or your baby.    Preeclampsia (high blood pressure)    Even if you  didn't have high blood pressure when you were pregnant, you are at risk for the high blood pressure disease called preeclampsia. This risk can last up to 12 weeks after giving birth.     Call your provider if you have:   Pain on your right side under your rib cage  Sudden swelling in the hands and face    Remember: You know your body. If something doesn't feel right, get medical help.     For informational purposes only. Not to replace the advice of your health care provider. Copyright 2020 [facility]. All rights reserved. Clinically reviewed by Apoorva Perez, RNC-OB, MSN. [employer] 310635 - Rev 02/23.

## 2024-10-30 NOTE — PROGRESS NOTES
OB/GYN Postpartum Note      S:  Patient is feeling well this morning.  Lochia = menses.  Eating and drinking without nausea.  Ambulating without difficulty.  Pain is well controlled.  Breastfeeding without questions or concerns.      O:   Vitals:    10/29/24 1533 10/29/24 1816 10/30/24 0100 10/30/24 0730   BP: 103/65 109/73 106/57 103/57   BP Location: Left arm Right arm     Patient Position: Semi-Rosado's Semi-Rosado's     Cuff Size: Adult Regular Adult Regular     Pulse: 70   64   Resp: 16 16 16 16   Temp: 97.5  F (36.4  C) 98.1  F (36.7  C) 97.3  F (36.3  C) 97  F (36.1  C)   TempSrc: Temporal Temporal Temporal Temporal   SpO2: 97% 96%  98%   Weight:       Height:         General: resting in bed, in NAD  Resp: nonlabored  Abdomen: soft, nontender, nondistended  Fundus firm at umbilicus  Extremities: no edema in BLE    Hemoglobin   Date Value Ref Range Status   10/30/2024 12.8 11.7 - 15.7 g/dL Final   2021 12.1 11.7 - 15.7 g/dL Final   ]    A: Ms. Pat Mccabe is a 38 year old  PPD #1 s/p     P:  GDMA2: fasting glucose 83 this AM  Heme 12.9 >  > 12.8  GI: tolerating regular diet  Feeding: breast  Contraception: will discuss at PP visit  Rubella Immune  Rh positive  Disposition: routine cares, desires discharge this afternoon    Mikayla Shen MD FACOG  OB/GYN  10/30/2024 10:15 AM

## 2024-10-30 NOTE — LACTATION NOTE
This note was copied from a baby's chart.  INPATIENT LACTATION CONSULT      Consult with Pat and sadie regarding breastfeeding.  Pat nursed her last child for about 5 months with no problems. Obvious rooting with a strong latch observed this feeding session.  Rhythmic and aggressive suckling also noted.  Instructed Pat on correct positioning and technique when latching babe on.  Pat is independent with latching babe onto breast.  Minimal assistance required.  Encouraged Pat on the importance of frequent feedings throughout the day (at least 8-12 feedings in a 24 hour period) and skin to skin contact.  Pat demonstrated and states she understands all information given.    Jossie Esteban RN, IBCLC  Lactation Consultant  [facility]

## 2024-12-10 ENCOUNTER — PRENATAL OFFICE VISIT (OUTPATIENT)
Dept: OBGYN | Facility: OTHER | Age: 39
End: 2024-12-10
Attending: OBSTETRICS & GYNECOLOGY
Payer: COMMERCIAL

## 2024-12-10 VITALS
SYSTOLIC BLOOD PRESSURE: 102 MMHG | HEART RATE: 68 BPM | DIASTOLIC BLOOD PRESSURE: 60 MMHG | BODY MASS INDEX: 28.73 KG/M2 | WEIGHT: 178 LBS

## 2024-12-10 DIAGNOSIS — I83.813 VARICOSE VEINS OF BOTH LOWER EXTREMITIES WITH PAIN: ICD-10-CM

## 2024-12-10 DIAGNOSIS — K42.9 UMBILICAL HERNIA WITHOUT OBSTRUCTION AND WITHOUT GANGRENE: ICD-10-CM

## 2024-12-10 DIAGNOSIS — O24.419 GDM, CLASS A2: ICD-10-CM

## 2024-12-10 RX ORDER — CITALOPRAM HYDROBROMIDE 10 MG/1
10 TABLET ORAL DAILY
Qty: 30 TABLET | Refills: 1 | Status: SHIPPED | OUTPATIENT
Start: 2024-12-10

## 2024-12-10 RX ORDER — ACETAMINOPHEN AND CODEINE PHOSPHATE 120; 12 MG/5ML; MG/5ML
0.35 SOLUTION ORAL DAILY
Qty: 84 TABLET | Refills: 3 | Status: SHIPPED | OUTPATIENT
Start: 2024-12-10

## 2024-12-10 ASSESSMENT — PAIN SCALES - GENERAL: PAINLEVEL_OUTOF10: NO PAIN (0)

## 2024-12-10 NOTE — PROGRESS NOTES
6 week Postpartum Visit Note    S:  Ms. Pat Mccabe is a 39 year old  here for her 6-week postpartum checkup.   - Had a  on 10/29/24.  - Infant gender:  girl, weight 8 pounds 10.5 oz.  - Feeding Method:  .  Complications reported with feeding:  none, infant thriving .    - Bleeding:  None.  Duration:  stopped after 2-3 weeks.  Menses resumed:  No  - Bowel/Urinary problems:  No  - Mood: not great. Has been a lot more anxious with this postpartum experience. Finds herself being more irritable, short tempered with her family.  Reached out to [facility] and doing her intake paperwork  - Sleep: good  -Varicose veins: painful, requesting referral for vein ablation    [place] Depression Scale  Thoughts of Harming Self: Never  Total Score: 10      - Contraception Planned:  POPs until vasectomy  - She  has not had intercourse since delivery..    - Current tobacco use:  No  - Hx of Abuse:  No  ================================================================  ROS: 10 point ROS neg other than the symptoms noted above in the HPI.     O:  /60 (BP Location: Right arm, Patient Position: Sitting, Cuff Size: Adult Regular)   Pulse 68   Wt 80.7 kg (178 lb)   LMP 2024 (Approximate)   Breastfeeding Yes   BMI 28.73 kg/m    Gen: Well-appearing, NAD  Psych:  Appropriate mood and affect  Breast: Deferred, no concerns  Abd:  Benign and Soft, flat, non-tender. 3cm umbilical hernia, soft  Pelvic: Well healed perineum. Normal vagina and cervix. Uterus normal size, anteverted, nontender. No adnexal masses or tenderness      A/P:  Ms. Pat Mccabe is a 39 year old  here for 6 week postpartum visit after . Doing well.  - umbilical hernia: general surgery referral  - GDM: 2 hr GTT ord'd  - Mood: primarily anxiety. Has already reached out for therapy. Also interested in starting medications, Rx sent for celexa.   - Contraception: POPs until vasectomy  - Feeding: breast  -  Follow-up: 1 month mood check    Mikayla Shen MD  OB/GYN  12/10/2024 9:47 AM

## 2024-12-10 NOTE — NURSING NOTE
Chief Complaint   Patient presents with    Postpartum Care     6 week post partum        Medication Reconciliation: complete        Peri Santacruz LPN........................12/10/2024  9:37 AM

## 2024-12-17 ENCOUNTER — OFFICE VISIT (OUTPATIENT)
Dept: OTOLARYNGOLOGY | Facility: OTHER | Age: 39
End: 2024-12-17
Attending: NURSE PRACTITIONER
Payer: COMMERCIAL

## 2024-12-17 VITALS
DIASTOLIC BLOOD PRESSURE: 62 MMHG | TEMPERATURE: 97.4 F | OXYGEN SATURATION: 96 % | HEIGHT: 66 IN | HEART RATE: 64 BPM | RESPIRATION RATE: 14 BRPM | WEIGHT: 180 LBS | SYSTOLIC BLOOD PRESSURE: 112 MMHG | BODY MASS INDEX: 28.93 KG/M2

## 2024-12-17 DIAGNOSIS — L29.9 EAR ITCHING: ICD-10-CM

## 2024-12-17 DIAGNOSIS — H93.292 DISTORTED HEARING OF LEFT EAR: Primary | ICD-10-CM

## 2024-12-17 DIAGNOSIS — H93.8X2 SENSATION OF FULLNESS IN EAR, LEFT: ICD-10-CM

## 2024-12-17 PROCEDURE — G0463 HOSPITAL OUTPT CLINIC VISIT: HCPCS

## 2024-12-17 PROCEDURE — 92504 EAR MICROSCOPY EXAMINATION: CPT | Performed by: NURSE PRACTITIONER

## 2024-12-17 ASSESSMENT — PAIN SCALES - GENERAL: PAINLEVEL_OUTOF10: NO PAIN (0)

## 2024-12-17 NOTE — LETTER
2024      Pat Mccabe  [phone]  [facility] [address]      Dear Colleague,    Thank you for referring your patient, Pat Mccabe, to the [facility]. Please see a copy of my visit note below.    Otolaryngology Note         Chief Complaint:     Patient presents with:  Ear Problem: Itchy ears            History of Present Illness:     Pat Mccabe is a 39 year old female seen today for ear itching.      History of OE, was last seen in ENT on 22.      She gave birth about 6 weeks ago and has been noting fullness and itching in both ears, left is worse than right.  She also gets distorted hearing in the left ear when baby is crying.   No rafal change in hearing.  No other bothersome tinnitus. No recent otics.  She has been avoiding q-tips for the most part.      No otalgia, rafal otorrhea, vertigo, flux hearing.           Medications:     Current Outpatient Rx   Medication Sig Dispense Refill     citalopram (CELEXA) 10 MG tablet Take 1 tablet (10 mg) by mouth daily. 30 tablet 1     Prenatal Vit-Fe Fumarate-FA (PRENATAL VITAMIN AND MINERAL PO)        norethindrone (MICRONOR) 0.35 MG tablet Take 1 tablet (0.35 mg) by mouth daily. (Patient not taking: Reported on 2024) 84 tablet 3            Allergies:     Allergies: Amoxicillin-pot clavulanate          Past Medical History:     Past Medical History:   Diagnosis Date     GDM, class A2 10/29/2024     Syncope and collapse     ,with abd pain, (-) workup     Varicella             Past Surgical History:     Past Surgical History:   Procedure Laterality Date     COLONOSCOPY           LAPAROSCOPIC CHOLECYSTECTOMY      14,Cholecystectomy,Laparoscopic       ENT family history reviewed         Social History:     Social History     Tobacco Use     Smoking status: Former     Current packs/day: 0.00     Types: Cigarettes     Quit date: 2013     Years since quittin.1     Smokeless tobacco: Never      "Tobacco comments:     Quit 3 years    Vaping Use     Vaping status: Never Used   Substance Use Topics     Alcohol use: Not Currently     Alcohol/week: 0.0 standard drinks of alcohol     Comment: occasional, 3 per week      Drug use: Never            Review of Systems:     ROS: See HPI         Physical Exam:     /62 (BP Location: Right arm, Patient Position: Sitting, Cuff Size: Adult Regular)   Pulse 64   Temp 97.4  F (36.3  C) (Tympanic)   Resp 14   Ht 1.676 m (5' 6\")   Wt 81.6 kg (180 lb)   LMP 02/02/2024 (Approximate)   SpO2 96%   BMI 29.05 kg/m      General - The patient is well nourished and well developed, and appears to have good nutritional status.  Alert and oriented to person and place, answers questions and cooperates with examination appropriately.   Head and Face - Normocephalic and atraumatic, with no gross asymmetry noted.  The facial nerve is intact, with strong symmetric movements.  Voice and Breathing - The patient was breathing comfortably without the use of accessory muscles. There was no wheezing, stridor. The patients voice was clear and strong, and had appropriate pitch and quality.  Ears - External ear normal. The ears were examined under binocular microscopy and with otoscope.  Canals are patent. Right tympanic membrane is intact without effusion, retraction or mass. Left tympanic membrane is intact without effusion, retraction or mass.  Alex does not lateralize, rinne + bilaterally.   Eyes - Extraocular movements intact, sclera were not icteric or injected.  Mouth - Examination of the oral cavity showed pink, healthy oral mucosa. Dentition in good condition. No lesions or ulcerations noted. The tongue was mobile and midline.   Throat - The walls of the oropharynx were smooth, pink, moist, symmetric, and had no lesions or ulcerations.  The tonsillar pillars and soft palate were symmetric. The uvula was midline on elevation.    Neck - Normal ROM, no palpable lymphadenopathy. "   Nose - External contour is symmetric, no gross deflection or scars.  Nasal mucosa is pink and moist with no abnormal mucus.  The septum and turbinates were evaluated with nasal speculum, no polyps, masses, or purulence noted on examination.         Assessment and Plan:       ICD-10-CM    1. Distorted hearing of left ear  H93.292 Adult Audiology  Referral      2. Sensation of fullness in ear, left  H93.8X2       3. Ear itching  L29.9         Both ears appear healthy, reassured no evidence of OE  Follow up for audiogram  Follow up sooner with changes in hearing or symptoms    Angeles Ferguson NP-C  [facility]    Again, thank you for allowing me to participate in the care of your patient.        Sincerely,        Angeles Ferguson, NP

## 2024-12-17 NOTE — PATIENT INSTRUCTIONS
Both ears appear healthy  Follow up for audiogram  Follow up sooner with changes in hearing or symptoms    Thank you for allowing Angeles Ferguson and our ENT team to participate in your care.  If your medications are too expensive, please give the nurse a call.  We can possibly change this medication.  If you have a scheduling or an appointment question please contact our Health Unit Coordinator at their direct line [phone] ext [phone].   ALL nursing questions or concerns can be directed to your ENT nurse at: [phone] - ann

## 2024-12-17 NOTE — PROGRESS NOTES
Otolaryngology Note         Chief Complaint:     Patient presents with:  Ear Problem: Itchy ears            History of Present Illness:     Pat Mccabe is a 39 year old female seen today for ear itching.      History of OE, was last seen in ENT on 22.      She gave birth about 6 weeks ago and has been noting fullness and itching in both ears, left is worse than right.  She also gets distorted hearing in the left ear when baby is crying.   No rafal change in hearing.  No other bothersome tinnitus. No recent otics.  She has been avoiding q-tips for the most part.      No otalgia, rafal otorrhea, vertigo, flux hearing.           Medications:     Current Outpatient Rx   Medication Sig Dispense Refill    citalopram (CELEXA) 10 MG tablet Take 1 tablet (10 mg) by mouth daily. 30 tablet 1    Prenatal Vit-Fe Fumarate-FA (PRENATAL VITAMIN AND MINERAL PO)       norethindrone (MICRONOR) 0.35 MG tablet Take 1 tablet (0.35 mg) by mouth daily. (Patient not taking: Reported on 2024) 84 tablet 3            Allergies:     Allergies: Amoxicillin-pot clavulanate          Past Medical History:     Past Medical History:   Diagnosis Date    GDM, class A2 10/29/2024    Syncope and collapse     ,with abd pain, (-) workup    Varicella             Past Surgical History:     Past Surgical History:   Procedure Laterality Date    COLONOSCOPY          LAPAROSCOPIC CHOLECYSTECTOMY      14,Cholecystectomy,Laparoscopic       ENT family history reviewed         Social History:     Social History     Tobacco Use    Smoking status: Former     Current packs/day: 0.00     Types: Cigarettes     Quit date: 2013     Years since quittin.1    Smokeless tobacco: Never    Tobacco comments:     Quit 3 years    Vaping Use    Vaping status: Never Used   Substance Use Topics    Alcohol use: Not Currently     Alcohol/week: 0.0 standard drinks of alcohol     Comment: occasional, 3 per week     Drug use: Never             "Review of Systems:     ROS: See HPI         Physical Exam:     /62 (BP Location: Right arm, Patient Position: Sitting, Cuff Size: Adult Regular)   Pulse 64   Temp 97.4  F (36.3  C) (Tympanic)   Resp 14   Ht 1.676 m (5' 6\")   Wt 81.6 kg (180 lb)   LMP 02/02/2024 (Approximate)   SpO2 96%   BMI 29.05 kg/m      General - The patient is well nourished and well developed, and appears to have good nutritional status.  Alert and oriented to person and place, answers questions and cooperates with examination appropriately.   Head and Face - Normocephalic and atraumatic, with no gross asymmetry noted.  The facial nerve is intact, with strong symmetric movements.  Voice and Breathing - The patient was breathing comfortably without the use of accessory muscles. There was no wheezing, stridor. The patients voice was clear and strong, and had appropriate pitch and quality.  Ears - External ear normal. The ears were examined under binocular microscopy and with otoscope.  Canals are patent. Right tympanic membrane is intact without effusion, retraction or mass. Left tympanic membrane is intact without effusion, retraction or mass.  Alex does not lateralize, rinne + bilaterally.   Eyes - Extraocular movements intact, sclera were not icteric or injected.  Mouth - Examination of the oral cavity showed pink, healthy oral mucosa. Dentition in good condition. No lesions or ulcerations noted. The tongue was mobile and midline.   Throat - The walls of the oropharynx were smooth, pink, moist, symmetric, and had no lesions or ulcerations.  The tonsillar pillars and soft palate were symmetric. The uvula was midline on elevation.    Neck - Normal ROM, no palpable lymphadenopathy.   Nose - External contour is symmetric, no gross deflection or scars.  Nasal mucosa is pink and moist with no abnormal mucus.  The septum and turbinates were evaluated with nasal speculum, no polyps, masses, or purulence noted on examination.         " Assessment and Plan:       ICD-10-CM    1. Distorted hearing of left ear  H93.292 Adult Audiology  Referral      2. Sensation of fullness in ear, left  H93.8X2       3. Ear itching  L29.9         Both ears appear healthy, reassured no evidence of OE  Follow up for audiogram  Follow up sooner with changes in hearing or symptoms    Angeles Ferguson NP-C  [facility]

## 2024-12-18 ENCOUNTER — OFFICE VISIT (OUTPATIENT)
Dept: SURGERY | Facility: OTHER | Age: 39
End: 2024-12-18
Attending: SURGERY
Payer: COMMERCIAL

## 2024-12-18 VITALS
TEMPERATURE: 98.9 F | WEIGHT: 183.2 LBS | BODY MASS INDEX: 29.57 KG/M2 | OXYGEN SATURATION: 97 % | HEART RATE: 64 BPM | RESPIRATION RATE: 16 BRPM | SYSTOLIC BLOOD PRESSURE: 110 MMHG | DIASTOLIC BLOOD PRESSURE: 68 MMHG

## 2024-12-18 DIAGNOSIS — K42.9 UMBILICAL HERNIA WITHOUT OBSTRUCTION AND WITHOUT GANGRENE: Primary | ICD-10-CM

## 2024-12-18 PROBLEM — Z36.89 ENCOUNTER FOR TRIAGE IN PREGNANT PATIENT: Status: RESOLVED | Noted: 2021-10-11 | Resolved: 2024-12-18

## 2024-12-18 PROBLEM — O24.419 GDM, CLASS A2: Status: RESOLVED | Noted: 2024-10-29 | Resolved: 2024-12-18

## 2024-12-18 PROBLEM — O09.523 MULTIGRAVIDA OF ADVANCED MATERNAL AGE IN THIRD TRIMESTER: Status: RESOLVED | Noted: 2024-10-29 | Resolved: 2024-12-18

## 2024-12-18 PROCEDURE — G0463 HOSPITAL OUTPT CLINIC VISIT: HCPCS

## 2024-12-18 NOTE — PROGRESS NOTES
Surgical Clinic Consult  Referring physician:  MURRAY Shen  Primary physician:     Renetta Fletcher    Chief complaint:   Umbilical hernia    History of present illness:  This is a 39 year old female I am seeing in consultation for an umbilical hernia.  The patient noticed this after her third pregnancy.  She just completed her fourth.  It was larger with this pregnancy.  It is gone down in size and is not causing any discomfort currently.  It was causing her more discomfort during her pregnancy.  Only prior abdominal surgery is a laparoscopic cholecystectomy after her first pregnancy.     Past medical history:   Past Medical History:   Diagnosis Date    GDM, class A2 10/29/2024    Syncope and collapse     2011,with abd pain, (-) workup    Varicella        Pastsurgical history:  Past Surgical History:   Procedure Laterality Date    COLONOSCOPY      2011    LAPAROSCOPIC CHOLECYSTECTOMY      1/2/14,Cholecystectomy,Laparoscopic       Current medications:  Current Outpatient Medications   Medication Sig Dispense Refill    citalopram (CELEXA) 10 MG tablet Take 1 tablet (10 mg) by mouth daily. 30 tablet 1    Prenatal Vit-Fe Fumarate-FA (PRENATAL VITAMIN AND MINERAL PO)       norethindrone (MICRONOR) 0.35 MG tablet Take 1 tablet (0.35 mg) by mouth daily. (Patient not taking: Reported on 12/18/2024) 84 tablet 3       Allergies:  Allergies   Allergen Reactions    Amoxicillin-Pot Clavulanate GI Disturbance     Stomach cramps and diarrhea       Family history:  Family History   Problem Relation Age of Onset    No Known Problems Mother     No Known Problems Father     Cardiovascular Paternal Grandfather     No Known Problems Brother     No Known Problems Sister         Uterine rupture       Social history:  Social History     Socioeconomic History    Marital status:      Spouse name: Not on file    Number of children: Not on file    Years of education: Not on file    Highest education level: Not on file   Occupational  History    Not on file   Tobacco Use    Smoking status: Former     Current packs/day: 0.00     Types: Cigarettes     Quit date: 2013     Years since quittin.1    Smokeless tobacco: Never    Tobacco comments:     Quit 3 years    Vaping Use    Vaping status: Never Used   Substance and Sexual Activity    Alcohol use: Not Currently     Alcohol/week: 0.0 standard drinks of alcohol     Comment: occasional, 3 per week     Drug use: Never    Sexual activity: Yes     Partners: Male     Birth control/protection: None   Other Topics Concern    Not on file   Social History Narrative    Patient lives in [place].     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    AdalidfrienStephan martinez, works at [employer].    .     Social Drivers of Health     Financial Resource Strain: Low Risk  (10/29/2024)    Financial Resource Strain     Within the past 12 months, have you or your family members you live with been unable to get utilities (heat, electricity) when it was really needed?: No   Food Insecurity: Low Risk  (10/29/2024)    Food Insecurity     Within the past 12 months, did you worry that your food would run out before you got money to buy more?: No     Within the past 12 months, did the food you bought just not last and you didn t have money to get more?: No   Transportation Needs: Low Risk  (10/29/2024)    Transportation Needs     Within the past 12 months, has lack of transportation kept you from medical appointments, getting your medicines, non-medical meetings or appointments, work, or from getting things that you need?: No   Physical Activity: Not on file   Stress: Not on file   Social Connections: Not on file   Interpersonal Safety: Low Risk  (2024)    Interpersonal Safety     Do you feel physically and emotionally safe where you currently live?: Yes     Within the past 12 months, have you been hit, slapped, kicked or otherwise physically hurt by someone?: No     Within the past 12 months, have  you been humiliated or emotionally abused in other ways by your partner or ex-partner?: No   Housing Stability: Low Risk  (10/29/2024)    Housing Stability     Do you have housing? : Yes     Are you worried about losing your housing?: No       PROBLEM LIST:  Patient Active Problem List   Diagnosis    Irregular menstrual cycle    Umbilical hernia without obstruction and without gangrene       Review of Systems:  COMPLETE REVIEW OF SYSTEMS: Denies problems  Gastrointestinal: Abdominal pain  Cardiovascular: Denies problems  Respiratory: Denies problems  Genitourinary: Denies problems  Musculoskeletal: Denies problems  Skin: Denies problems  Neurologic: Denies problems  Psychiatric: Depression  Endocrine: Denies problems currently, he had gestational diabetes.   Heme/Lymphatic: Denies problems  Vascular: Varicose veins    Physical exam: /68 (BP Location: Right arm, Patient Position: Sitting, Cuff Size: Adult Regular)   Pulse 64   Temp 98.9  F (37.2  C) (Tympanic)   Resp 16   Wt 83.1 kg (183 lb 3.2 oz)   LMP 02/02/2024 (Approximate)   SpO2 97%   BMI 29.57 kg/m      General: this is a pleasant female patient in no acute distress.  Patient is awake alert and oriented x3 .   EXAM:  Abdomen: Umbilical piercing noted.  Small bulge above the umbilicus noticed most when standing.  Reducible and nontender.  Fascial defect is indistinct seems under 2 cm.     Assessment:   Umbilical hernia , reducible.  Minimally symptomatic currently.  We discussed repairing a hernia with and without mesh pending both on the size of the defect as well as anticipated further pregnancies.  The defect is over 2 cm we would plan mesh as she plans no further pregnancies.    Plan:    She will follow-up for repair if the hernia becomes symptomatic or enlarges.  We discussed the risks to include bleeding, infection, recurrence, the use of prosthetic mesh .      Tripp Beltran MD

## 2024-12-18 NOTE — NURSING NOTE
"Chief Complaint   Patient presents with    Consult     Umbilical hernia       Medication reconciliation completed.    FOOD SECURITY SCREENING QUESTIONS:    The next two questions are to help us understand your food security.  If you are feeling you need any assistance in this area, we have resources available to support you today.    Hunger Vital Signs:  Within the past 12 months we worried whether our food would run out before we got money to buy more. Never  Within the past 12 months the food we bought just didn't last and we didn't have money to get more. Never    Initial /68 (BP Location: Right arm, Patient Position: Sitting, Cuff Size: Adult Regular)   Pulse 64   Temp 98.9  F (37.2  C) (Tympanic)   Resp 16   Wt 83.1 kg (183 lb 3.2 oz)   LMP 02/02/2024 (Approximate)   SpO2 97%   BMI 29.57 kg/m   Estimated body mass index is 29.57 kg/m  as calculated from the following:    Height as of 12/17/24: 1.676 m (5' 6\").    Weight as of this encounter: 83.1 kg (183 lb 3.2 oz).       Thi Edwards LPN .......  12/18/2024  2:52 PM    "

## 2024-12-19 ENCOUNTER — MEDICAL CORRESPONDENCE (OUTPATIENT)
Dept: HEALTH INFORMATION MANAGEMENT | Facility: OTHER | Age: 39
End: 2024-12-19
Payer: COMMERCIAL

## 2025-01-06 ENCOUNTER — OFFICE VISIT (OUTPATIENT)
Dept: FAMILY MEDICINE | Facility: OTHER | Age: 40
End: 2025-01-06
Attending: STUDENT IN AN ORGANIZED HEALTH CARE EDUCATION/TRAINING PROGRAM
Payer: COMMERCIAL

## 2025-01-06 ENCOUNTER — TELEPHONE (OUTPATIENT)
Dept: FAMILY MEDICINE | Facility: OTHER | Age: 40
End: 2025-01-06

## 2025-01-06 VITALS
HEIGHT: 66 IN | DIASTOLIC BLOOD PRESSURE: 72 MMHG | RESPIRATION RATE: 16 BRPM | BODY MASS INDEX: 29.86 KG/M2 | TEMPERATURE: 98.7 F | HEART RATE: 88 BPM | SYSTOLIC BLOOD PRESSURE: 112 MMHG | OXYGEN SATURATION: 97 % | WEIGHT: 185.8 LBS

## 2025-01-06 DIAGNOSIS — R05.1 ACUTE COUGH: ICD-10-CM

## 2025-01-06 DIAGNOSIS — R50.9 FEVER IN ADULT: Primary | ICD-10-CM

## 2025-01-06 DIAGNOSIS — B96.89 BACTERIAL UPPER RESPIRATORY INFECTION: ICD-10-CM

## 2025-01-06 DIAGNOSIS — M62.81 GENERALIZED MUSCLE WEAKNESS: ICD-10-CM

## 2025-01-06 DIAGNOSIS — J06.9 BACTERIAL UPPER RESPIRATORY INFECTION: ICD-10-CM

## 2025-01-06 DIAGNOSIS — R52 BODY ACHES: ICD-10-CM

## 2025-01-06 LAB
FLUAV RNA SPEC QL NAA+PROBE: NEGATIVE
FLUBV RNA RESP QL NAA+PROBE: NEGATIVE
RSV RNA SPEC NAA+PROBE: NEGATIVE
SARS-COV-2 RNA RESP QL NAA+PROBE: NEGATIVE

## 2025-01-06 PROCEDURE — G0463 HOSPITAL OUTPT CLINIC VISIT: HCPCS

## 2025-01-06 PROCEDURE — 87637 SARSCOV2&INF A&B&RSV AMP PRB: CPT | Mod: ZL | Performed by: NURSE PRACTITIONER

## 2025-01-06 RX ORDER — AZITHROMYCIN 250 MG/1
TABLET, FILM COATED ORAL
Qty: 6 TABLET | Refills: 0 | Status: SHIPPED | OUTPATIENT
Start: 2025-01-06 | End: 2025-01-11

## 2025-01-06 ASSESSMENT — ENCOUNTER SYMPTOMS
FEVER: 1
EYES NEGATIVE: 1
HEMATOLOGIC/LYMPHATIC NEGATIVE: 1
NEUROLOGICAL NEGATIVE: 1
ENDOCRINE NEGATIVE: 1
PSYCHIATRIC NEGATIVE: 1
APPETITE CHANGE: 1
GASTROINTESTINAL NEGATIVE: 1
FATIGUE: 1
COUGH: 1
CARDIOVASCULAR NEGATIVE: 1
ACTIVITY CHANGE: 1
MUSCULOSKELETAL NEGATIVE: 1

## 2025-01-06 ASSESSMENT — PAIN SCALES - GENERAL: PAINLEVEL_OUTOF10: MODERATE PAIN (5)

## 2025-01-06 NOTE — NURSING NOTE
"Chief Complaint   Patient presents with    Cough     Cough, Fever, Body Aches, Chest Discomfort x 6 Days        Initial /72 (BP Location: Right arm, Patient Position: Chair, Cuff Size: Adult Regular)   Pulse 88   Temp 98.7  F (37.1  C) (Temporal)   Resp 16   Ht 1.676 m (5' 6\")   Wt 84.3 kg (185 lb 12.8 oz)   LMP 02/02/2024 (Approximate)   SpO2 97%   Breastfeeding Yes   BMI 29.99 kg/m   Estimated body mass index is 29.99 kg/m  as calculated from the following:    Height as of this encounter: 1.676 m (5' 6\").    Weight as of this encounter: 84.3 kg (185 lb 12.8 oz).      Medication Reconciliation: Complete.       Val Ward LPN on 1/6/2025 at 11:47 AM     "

## 2025-01-06 NOTE — TELEPHONE ENCOUNTER
The patient requests a call back regarding a fever of 102F.    [facility]    Okay to leave detailed message.      Dorothy Glass on 1/6/2025 at 4:41 PM

## 2025-01-06 NOTE — TELEPHONE ENCOUNTER
Recommend she uses Tylenol for her fever.  The antibiotics will take 24 to 48 hours to show improvement in her infection.      Discussed with patient in the visit that this also may be related to a viral infection and if she has a fever for more than 5 days recommend follow-up with re evaluation.      Nisha Huertas CNP on 1/6/2025 at 5:18 PM

## 2025-01-06 NOTE — PROGRESS NOTES
Pat Mccabe  1985    ASSESSMENT/PLAN      Presents to rapid clinic with cough, congestion, fever, body aches.  Patient has had symptoms for a week and overall worsening.  Patient has signs of systemic illness with fever and bodyaches.  Patient has a daughter with similar symptoms who was treated with Zithromax.  COVID, influenza, RSV negative.  Lung sounds clear to auscultation.  Patient declined chest x-ray at this time.  Will treat for bacterial upper respiratory infection with Zithromax.  Will not use Tessalon as patient is breast-feeding.  Patient's vitals are stable and she appears nontoxic.        1. Fever in adult (Primary)  2. Acute cough  3. Body aches  4. Generalized muscle weakness    - Influenza A/B, RSV and SARS-CoV2 PCR (COVID-19) Nose    5. Bacterial upper respiratory infection    - azithromycin (ZITHROMAX) 250 MG tablet; Take 2 tablets (500 mg) by mouth daily for 1 day, THEN 1 tablet (250 mg) daily for 4 days.  Dispense: 6 tablet; Refill: 0     - Symptomatic treatment - Encouraged fluids, salt water gargles, honey, humidifier, saline nasal spray, lozenges, tea, soup, smoothies, popsicles, topical vapor rub, rest, etc   - May use over-the-counter Tylenol or ibuprofen PRN  - Follow up as needed for new or worsening symptoms          *Explanation of diagnosis, treatment options and risk and benefits of medications reviewed with patient. Patient agrees with plan of care.  *All questions were answered.    *Red flags symptoms were discussed and patient was advised when they should return for reevaluation or for prompt emergency evaluation.   *Patient was given verbal and written instructions on plan of care. Instructions were printed or are available on [employer] on electronic AVS.   *We discussed potential side effects of any prescribed or recommended therapies, as well as expectations for response to treatments.  *Patient discharged in stable condition    Nisha Huertas, [facility]  [facility]    SUBJECTIVE  CHIEF COMPLAINT/ REASON FOR VISIT  Patient presents with:  Cough: Cough, Fever, Body Aches, Chest Discomfort x 6 Days      HISTORY OF PRESENT ILLNESS  Pat Mccabe is a pleasant 39 year old female presents to rapid clinic today with cough, fever, body aches, chest wall pain with coughing for a week.  Patient has had no known exposures.  Patient is breast-feeding..     I have reviewed the nursing notes.  I have reviewed allergies, medication list, problem list, and past medical history.    REVIEW OF SYSTEMS  Review of Systems   Constitutional:  Positive for activity change, appetite change, fatigue and fever.   HENT:  Positive for congestion.    Eyes: Negative.    Respiratory:  Positive for cough.    Cardiovascular: Negative.    Gastrointestinal: Negative.    Endocrine: Negative.    Genitourinary: Negative.    Musculoskeletal: Negative.    Skin: Negative.    Neurological: Negative.    Hematological: Negative.    Psychiatric/Behavioral: Negative.     All other systems reviewed and are negative.       VITAL SIGNS  Vitals:    01/06/25 1145   BP: 112/72   BP Location: Right arm   Patient Position: Chair   Cuff Size: Adult Regular   Pulse: 88   Resp: 16   Temp: 98.7  F (37.1  C)   TempSrc: Temporal   SpO2: 97%   Weight: 84.3 kg (185 lb 12.8 oz)   Height: 1.676 m (5' 6\")      Body mass index is 29.99 kg/m .      OBJECTIVE  PHYSICAL EXAM  Physical Exam  Vitals and nursing note reviewed.   Constitutional:       Appearance: Normal appearance.   HENT:      Head: Normocephalic.      Right Ear: Tympanic membrane normal.      Nose: Nose normal.      Mouth/Throat:      Mouth: Mucous membranes are moist.   Eyes:      Pupils: Pupils are equal, round, and reactive to light.   Cardiovascular:      Rate and Rhythm: Normal rate and regular rhythm.      Pulses: Normal pulses.      Heart sounds: Normal heart sounds.   Pulmonary:      Effort: Pulmonary effort is normal.      Breath sounds: Normal " breath sounds.   Abdominal:      General: Bowel sounds are normal.   Musculoskeletal:         General: Normal range of motion.   Skin:     General: Skin is warm and dry.      Capillary Refill: Capillary refill takes less than 2 seconds.   Neurological:      General: No focal deficit present.      Mental Status: She is alert.            DIAGNOSTICS  Results for orders placed or performed in visit on 01/06/25   Influenza A/B, RSV and SARS-CoV2 PCR (COVID-19) Nose     Status: Normal    Specimen: Nose; Swab   Result Value Ref Range    Influenza A PCR Negative Negative    Influenza B PCR Negative Negative    RSV PCR Negative Negative    SARS CoV2 PCR Negative Negative    Narrative    Testing was performed using the Xpert Xpress CoV2/Flu/RSV Assay on the [employer] GeneXpert Instrument. This test should be ordered for the detection of SARS-CoV2, influenza, and RSV viruses in individuals with signs and symptoms of respiratory tract infection. This test is for in vitro diagnostic use under the [place] FDA for laboratories certified under CLIA to perform high or moderate complexity testing. This test has been [place] FDA cleared. A negative result does not rule out the presence of PCR inhibitors in the specimen or target RNA in concentration below the limit of detection for the assay. If only one viral target is positive but coinfection with multiple targets is suspected, the sample should be re-tested with another FDA cleared, approved, or authorized test, if coninfection would change clinical management. This test was validated by the [facility]. These laboratories are certified under the Clinical Laboratory Improvement Amendments of 1988 (CLIA-88) as qualified to perfom high complexity laboratory testing.

## 2025-01-07 NOTE — TELEPHONE ENCOUNTER
Patient was informed of provider's recommendations and verbalized understanding.     Lissy Wyatt LPN on 1/6/2025 at 6:55 PM

## 2025-01-09 ENCOUNTER — HOSPITAL ENCOUNTER (EMERGENCY)
Facility: OTHER | Age: 40
Discharge: HOME | End: 2025-01-09
Attending: FAMILY MEDICINE
Payer: COMMERCIAL

## 2025-01-09 VITALS
DIASTOLIC BLOOD PRESSURE: 69 MMHG | TEMPERATURE: 97.6 F | WEIGHT: 183 LBS | HEART RATE: 61 BPM | SYSTOLIC BLOOD PRESSURE: 95 MMHG | HEIGHT: 66 IN | OXYGEN SATURATION: 97 % | BODY MASS INDEX: 29.41 KG/M2 | RESPIRATION RATE: 18 BRPM

## 2025-01-09 DIAGNOSIS — G44.211 INTRACTABLE EPISODIC TENSION-TYPE HEADACHE: ICD-10-CM

## 2025-01-09 PROCEDURE — 96375 TX/PRO/DX INJ NEW DRUG ADDON: CPT | Performed by: MARRIAGE & FAMILY THERAPIST

## 2025-01-09 PROCEDURE — 99283 EMERGENCY DEPT VISIT LOW MDM: CPT | Performed by: MARRIAGE & FAMILY THERAPIST

## 2025-01-09 PROCEDURE — 99284 EMERGENCY DEPT VISIT MOD MDM: CPT | Mod: 25 | Performed by: MARRIAGE & FAMILY THERAPIST

## 2025-01-09 PROCEDURE — 258N000003 HC RX IP 258 OP 636: Performed by: FAMILY MEDICINE

## 2025-01-09 PROCEDURE — 96361 HYDRATE IV INFUSION ADD-ON: CPT | Performed by: MARRIAGE & FAMILY THERAPIST

## 2025-01-09 PROCEDURE — 250N000011 HC RX IP 250 OP 636: Performed by: FAMILY MEDICINE

## 2025-01-09 PROCEDURE — 96374 THER/PROPH/DIAG INJ IV PUSH: CPT | Performed by: MARRIAGE & FAMILY THERAPIST

## 2025-01-09 RX ORDER — METOCLOPRAMIDE HYDROCHLORIDE 5 MG/ML
10 INJECTION INTRAMUSCULAR; INTRAVENOUS ONCE
Status: COMPLETED | OUTPATIENT
Start: 2025-01-09 | End: 2025-01-09

## 2025-01-09 RX ORDER — DIPHENHYDRAMINE HYDROCHLORIDE 50 MG/ML
25 INJECTION INTRAMUSCULAR; INTRAVENOUS ONCE
Status: COMPLETED | OUTPATIENT
Start: 2025-01-09 | End: 2025-01-09

## 2025-01-09 RX ORDER — KETOROLAC TROMETHAMINE 30 MG/ML
30 INJECTION, SOLUTION INTRAMUSCULAR; INTRAVENOUS ONCE
Status: COMPLETED | OUTPATIENT
Start: 2025-01-09 | End: 2025-01-09

## 2025-01-09 RX ADMIN — KETOROLAC TROMETHAMINE 30 MG: 30 INJECTION, SOLUTION INTRAMUSCULAR at 01:36

## 2025-01-09 RX ADMIN — METOCLOPRAMIDE 10 MG: 5 INJECTION, SOLUTION INTRAMUSCULAR; INTRAVENOUS at 01:39

## 2025-01-09 RX ADMIN — DIPHENHYDRAMINE HYDROCHLORIDE 25 MG: 50 INJECTION, SOLUTION INTRAMUSCULAR; INTRAVENOUS at 01:39

## 2025-01-09 RX ADMIN — SODIUM CHLORIDE 1000 ML: 9 INJECTION, SOLUTION INTRAVENOUS at 01:36

## 2025-01-09 ASSESSMENT — ACTIVITIES OF DAILY LIVING (ADL): ADLS_ACUITY_SCORE: 46

## 2025-01-09 ASSESSMENT — ENCOUNTER SYMPTOMS: HEADACHES: 1

## 2025-01-09 NOTE — ED PROVIDER NOTES
History     Chief Complaint   Patient presents with    Headache     The history is provided by the patient.     Pat Mccabe is a 39 year old female here with headache. She has a headache on and off since starting citalopram in December. Tonight the pain was worse and persistent. The headache has not been this bad in the past. She took Tylenol at 7 PM, no ibuprofen. She went to bed about 10:30 and woke up at midnight with headache. No history of migraine.     She delivered two months ago and is currently breast feeding.     Headache is seen in about 1% of patients on citalopram, per Up To Date.    Allergies:  Allergies   Allergen Reactions    Amoxicillin-Pot Clavulanate GI Disturbance     Stomach cramps and diarrhea       Problem List:    Patient Active Problem List    Diagnosis Date Noted    Umbilical hernia without obstruction and without gangrene 2024     Priority: Medium    Irregular menstrual cycle 2003     Priority: Medium        Past Medical History:    Past Medical History:   Diagnosis Date    GDM, class A2 10/29/2024    Syncope and collapse     Varicella        Past Surgical History:    Past Surgical History:   Procedure Laterality Date    COLONOSCOPY          LAPAROSCOPIC CHOLECYSTECTOMY      14,Cholecystectomy,Laparoscopic       Family History:    Family History   Problem Relation Age of Onset    No Known Problems Mother     No Known Problems Father     Cardiovascular Paternal Grandfather     No Known Problems Brother     No Known Problems Sister         Uterine rupture       Social History:  Marital Status:   [2]  Social History     Tobacco Use    Smoking status: Former     Current packs/day: 0.00     Types: Cigarettes     Quit date: 2013     Years since quittin.1     Passive exposure: Never    Smokeless tobacco: Never    Tobacco comments:     Quit 3 years    Vaping Use    Vaping status: Never Used   Substance Use Topics    Alcohol use: Not Currently      "Alcohol/week: 0.0 standard drinks of alcohol     Comment: occasional, 3 per week     Drug use: Never        Medications:    azithromycin (ZITHROMAX) 250 MG tablet  citalopram (CELEXA) 10 MG tablet  norethindrone (MICRONOR) 0.35 MG tablet  Prenatal Vit-Fe Fumarate-FA (PRENATAL VITAMIN AND MINERAL PO)          Review of Systems   Neurological:  Positive for headaches.   All other systems reviewed and are negative.      Physical Exam   BP: 127/87  Pulse: 67  Temp: 97.6  F (36.4  C)  Resp: 18  Height: 167.6 cm (5' 6\")  Weight: 83 kg (183 lb)  SpO2: 97 %      Physical Exam  Vitals and nursing note reviewed.   Constitutional:       General: She is not in acute distress.     Appearance: She is ill-appearing.   Neurological:      General: No focal deficit present.      Mental Status: She is alert and oriented to person, place, and time.         Medications   sodium chloride 0.9% BOLUS 1,000 mL (1,000 mLs Intravenous $New Bag 1/9/25 0136)   ketorolac (TORADOL) injection 30 mg (30 mg Intravenous $Given 1/9/25 0136)   metoclopramide (REGLAN) injection 10 mg (10 mg Intravenous $Given 1/9/25 0139)   diphenhydrAMINE (BENADRYL) injection 25 mg (25 mg Intravenous $Given 1/9/25 0139)       Assessments & Plan (with Medical Decision Making)  Pat Mccabe is a 39 year old female here with headache. She has a headache on and off since starting citalopram in December. Tonight the pain was worse and persistent. The headache has not been this bad in the past. She took Tylenol at 7 PM, no ibuprofen. She went to bed about 10:30 and woke up at midnight with headache. No history of migraine.   She delivered two months ago and is currently breast feeding.   Headache is seen in about 1% of patients on citalopram, per Up To Date.  VS in the ED  Patient Vitals for the past 24 hrs:   BP Temp Temp src Pulse Resp SpO2 Height Weight   01/09/25 0200 101/69 -- -- 62 -- -- -- --   01/09/25 0145 103/75 -- -- -- -- -- -- --   01/09/25 0130 104/78 " "-- -- 75 -- -- -- --   01/09/25 0110 127/87 97.6  F (36.4  C) Tympanic 67 18 97 % 1.676 m (5' 6\") 83 kg (183 lb)     She looks uncomfortable but is mentally clear.  We will treat for migraine and recommend that she \"pump and dump\" due to Toradol and Benadryl and she is okay with that.   2:18 AM  Her headache is gone.      I have reviewed the nursing notes.    I have reviewed the findings, diagnosis, plan and need for follow up with the patient.  Medical Decision Making  The patient's presentation was of low complexity (an acute and uncomplicated illness or injury).    The patient's evaluation involved:  history and exam without other MDM data elements    The patient's management necessitated moderate risk (prescription drug management including medications given in the ED).      Final diagnoses:   Intractable episodic tension-type headache       1/9/2025   [facility]  01/09/25 0219    "

## 2025-01-09 NOTE — ED TRIAGE NOTES
"Pt presents to triage via private vehicle for concern of headache. States to for the past 7 days or so she has been having \"splitting\" headaches at night, states they resolve during the day and then return. Seen in clinic last week and given Z Pack for fever, body aches (Negative Flu/Covid/RSV then), and has been feeling improved in those symptoms, but headache is still significant. States to no relief with tylenol or ibuprofen, last had APAP at 1900 tonight. Did recently start new Citalopram prescription, unsure if related. Of note, pt is currently breastfeeding.    /87   Pulse 67   Temp 97.6  F (36.4  C) (Tympanic)   Resp 18   Ht 1.676 m (5' 6\")   Wt 83 kg (183 lb)   LMP 02/02/2024 (Approximate)   SpO2 97%   BMI 29.54 kg/m          Triage Assessment (Adult)       Row Name 01/09/25 0112          Triage Assessment    Airway WDL WDL        Respiratory WDL    Respiratory WDL WDL        Skin Circulation/Temperature WDL    Skin Circulation/Temperature WDL WDL        Cardiac WDL    Cardiac WDL WDL        Peripheral/Neurovascular WDL    Peripheral Neurovascular WDL WDL        Cognitive/Neuro/Behavioral WDL    Cognitive/Neuro/Behavioral WDL X  headache     Level of Consciousness alert     Arousal Level opens eyes spontaneously     Orientation oriented x 4     Speech clear;spontaneous;logical     Mood/Behavior behavior appropriate to situation;calm;cooperative                     "

## 2025-01-09 NOTE — DISCHARGE INSTRUCTIONS
Thank you for choosing our Emergency Department for your care.     You may receive a phone call or letter for a survey about your care in our ED.  Please complete this as this is how we improve care for our patients.     If you have any questions after leaving the ED you can call or text me on my cell phone at [phone].  I am not on call so if I do not answer my phone please leave a message- I will get back to you.  If you are not doing well please return to the ED.     Sincerely,    Dr Davon Monreal M.D.  Medical Director  [facility] Emergency Department

## 2025-03-13 ENCOUNTER — MYC MEDICAL ADVICE (OUTPATIENT)
Dept: OBGYN | Facility: OTHER | Age: 40
End: 2025-03-13
Payer: COMMERCIAL

## 2025-03-13 DIAGNOSIS — I83.813 VARICOSE VEINS OF BOTH LOWER EXTREMITIES WITH PAIN: Primary | ICD-10-CM

## 2025-03-13 NOTE — TELEPHONE ENCOUNTER
Call placed to radiology who stated that they are doing the varicose veins procedure at [facility]. Order pending per radiology. Message sent to pt to see if she is wanting this done at [facility].     Melva Meneses RN on 3/13/2025 at 1:30 PM

## 2025-03-13 NOTE — TELEPHONE ENCOUNTER
Pt is wanting to be seen at [facility]. Referral pending. Sending to Mikayla Shen MD for review.     Melva Meneses RN on 3/13/2025 at 4:25 PM

## 2025-03-14 ENCOUNTER — MEDICAL CORRESPONDENCE (OUTPATIENT)
Dept: HEALTH INFORMATION MANAGEMENT | Facility: OTHER | Age: 40
End: 2025-03-14
Payer: COMMERCIAL

## 2025-03-19 NOTE — PROGRESS NOTES
"Chief Complaint   Patient presents with    Hearing Problem     HEA     Patient returns to ENT for follow up exam  She was last seen on 12/17/24 by Chel.   She had felt some itching in her ears and constant itching throughout canals.   She had felt some issues with her ear fluttering.    Her left ear has a fluttering feeling in her ear and clears quickly.   She felt       History of OE, was last seen in ENT on 6/6/22.       She gave birth about 6 weeks ago and has been noting fullness and itching in both ears, left is worse than right.  She also gets distorted hearing in the left ear when baby is crying.   No rafal change in hearing.  No other bothersome tinnitus. No recent otics.  She has been avoiding q-tips for the most part.       No otalgia, rafal otorrhea, vertigo, flux hearing.           Past Medical History:   Diagnosis Date    GDM, class A2 10/29/2024    Syncope and collapse     2011,with abd pain, (-) workup    Varicella         Allergies   Allergen Reactions    Amoxicillin-Pot Clavulanate GI Disturbance     Stomach cramps and diarrhea         Current Outpatient Medications   Medication Sig Dispense Refill    citalopram (CELEXA) 10 MG tablet TAKE 1 TABLET (10 MG) BY MOUTH DAILY. (Patient not taking: Reported on 1/10/2025) 90 tablet 1    norethindrone (MICRONOR) 0.35 MG tablet Take 1 tablet (0.35 mg) by mouth daily. 84 tablet 3    Prenatal Vit-Fe Fumarate-FA (PRENATAL VITAMIN AND MINERAL PO)        No current facility-administered medications for this visit.     ROS_ SEE HPI  /73 (BP Location: Right arm, Patient Position: Sitting, Cuff Size: Adult Regular)   Pulse 64   Temp 98.3  F (36.8  C) (Tympanic)   Resp 16   Ht 1.676 m (5' 5.98\")   Wt 82.6 kg (182 lb)   SpO2 98%   BMI 29.39 kg/m      General - The patient is well nourished and well developed, and appears to have good nutritional status.  Alert and oriented to person and place, answers questions and cooperates with examination appropriately. "   Head and Face - Normocephalic and atraumatic, with no gross asymmetry noted.  The facial nerve is intact, with strong symmetric movements.  Voice and Breathing - The patient was breathing comfortably without the use of accessory muscles. There was no wheezing, stridor. The patients voice was clear and strong, and had appropriate pitch and quality.  Ears - External ear normal. The ears were examined under binocular microscopy and with otoscope.  Canals are patent. Right tympanic membrane is intact without effusion, retraction or mass. Left tympanic membrane is intact without effusion, retraction or mass.  Alex does not lateralize, rinne + bilaterally.   Eyes - Extraocular movements intact, sclera were not icteric or injected.  Mouth - Examination of the oral cavity showed pink, healthy oral mucosa. Dentition in good condition. No lesions or ulcerations noted. The tongue was mobile and midline.   Throat - The walls of the oropharynx were smooth, pink, moist, symmetric, and had no lesions or ulcerations.  The tonsillar pillars and soft palate were symmetric. The uvula was midline on elevation.    Neck - Normal ROM, no palpable lymphadenopathy.   Nose - External contour is symmetric, no gross deflection or scars.  Nasal mucosa is pink and moist with no abnormal mucus.  The septum and turbinates were evaluated with nasal speculum, no polyps, masses, or purulence noted on examination.          Assessment and Plan:           Pinky Flores PA-C  ENT  [facility]

## 2025-03-20 ENCOUNTER — OFFICE VISIT (OUTPATIENT)
Dept: OTOLARYNGOLOGY | Facility: OTHER | Age: 40
End: 2025-03-20
Attending: AUDIOLOGIST
Payer: COMMERCIAL

## 2025-03-20 ENCOUNTER — OFFICE VISIT (OUTPATIENT)
Dept: AUDIOLOGY | Facility: OTHER | Age: 40
End: 2025-03-20
Attending: AUDIOLOGIST
Payer: COMMERCIAL

## 2025-03-20 VITALS
DIASTOLIC BLOOD PRESSURE: 73 MMHG | RESPIRATION RATE: 16 BRPM | BODY MASS INDEX: 29.25 KG/M2 | HEART RATE: 64 BPM | TEMPERATURE: 98.3 F | WEIGHT: 182 LBS | OXYGEN SATURATION: 98 % | SYSTOLIC BLOOD PRESSURE: 113 MMHG | HEIGHT: 66 IN

## 2025-03-20 DIAGNOSIS — H93.292 DISTORTED HEARING OF LEFT EAR: ICD-10-CM

## 2025-03-20 DIAGNOSIS — H90.3 SENSORINEURAL HEARING LOSS (SNHL) OF BOTH EARS: Primary | ICD-10-CM

## 2025-03-20 PROCEDURE — 92567 TYMPANOMETRY: CPT | Performed by: AUDIOLOGIST

## 2025-03-20 PROCEDURE — 92557 COMPREHENSIVE HEARING TEST: CPT | Performed by: AUDIOLOGIST

## 2025-03-20 ASSESSMENT — PAIN SCALES - GENERAL: PAINLEVEL_OUTOF10: NO PAIN (0)

## 2025-03-20 NOTE — Clinical Note
3/20/2025      Pat Mccabe  [address]  [facility] [address]      Dear Colleague,    Thank you for referring your patient, Pat Mccabe, to the [facility]. Please see a copy of my visit note below.    Chief Complaint   Patient presents with    Hearing Problem     HEA         History of OE, was last seen in ENT on 6/6/22.       She gave birth about 6 weeks ago and has been noting fullness and itching in both ears, left is worse than right.  She also gets distorted hearing in the left ear when baby is crying.   No rafal change in hearing.  No other bothersome tinnitus. No recent otics.  She has been avoiding q-tips for the most part.       No otalgia, rafal otorrhea, vertigo, flux hearing.           Past Medical History:   Diagnosis Date    GDM, class A2 10/29/2024    Syncope and collapse     2011,with abd pain, (-) workup    Varicella         Allergies   Allergen Reactions    Amoxicillin-Pot Clavulanate GI Disturbance     Stomach cramps and diarrhea         Current Outpatient Medications   Medication Sig Dispense Refill    citalopram (CELEXA) 10 MG tablet TAKE 1 TABLET (10 MG) BY MOUTH DAILY. (Patient not taking: Reported on 1/10/2025) 90 tablet 1    norethindrone (MICRONOR) 0.35 MG tablet Take 1 tablet (0.35 mg) by mouth daily. 84 tablet 3    Prenatal Vit-Fe Fumarate-FA (PRENATAL VITAMIN AND MINERAL PO)        No current facility-administered medications for this visit.     ROS_ SEE HPI    General - The patient is well nourished and well developed, and appears to have good nutritional status.  Alert and oriented to person and place, answers questions and cooperates with examination appropriately.   Head and Face - Normocephalic and atraumatic, with no gross asymmetry noted.  The facial nerve is intact, with strong symmetric movements.  Voice and Breathing - The patient was breathing comfortably without the use of accessory muscles. There was no wheezing, stridor. The patients  voice was clear and strong, and had appropriate pitch and quality.  Ears - External ear normal. The ears were examined under binocular microscopy and with otoscope.  Canals are patent. Right tympanic membrane is intact without effusion, retraction or mass. Left tympanic membrane is intact without effusion, retraction or mass.  Alex does not lateralize, rinne + bilaterally.   Eyes - Extraocular movements intact, sclera were not icteric or injected.  Mouth - Examination of the oral cavity showed pink, healthy oral mucosa. Dentition in good condition. No lesions or ulcerations noted. The tongue was mobile and midline.   Throat - The walls of the oropharynx were smooth, pink, moist, symmetric, and had no lesions or ulcerations.  The tonsillar pillars and soft palate were symmetric. The uvula was midline on elevation.    Neck - Normal ROM, no palpable lymphadenopathy.   Nose - External contour is symmetric, no gross deflection or scars.  Nasal mucosa is pink and moist with no abnormal mucus.  The septum and turbinates were evaluated with nasal speculum, no polyps, masses, or purulence noted on examination.          Assessment and Plan:         Again, thank you for allowing me to participate in the care of your patient.        Sincerely,        Pinky Flores PA-C    Electronically signed

## 2025-03-20 NOTE — PATIENT INSTRUCTIONS
Ears look clear. No fluid or infection.   Repeat audiogram in 6 months with exam  Consider MRI of inner ear.   If any concerns, return to ENT sooner.     Thank you for allowing LAKESHIA Malik and our ENT team to participate in your care.  If your medications are too expensive, please give the nurse a call.  We can possibly change this medication.  If you have a scheduling or an appointment question please contact our Health Unit Coordinator at their direct line [phone].   ALL nursing questions or concerns can be directed to your ENT nurse, Marisela at: [phone].      VINEGAR AND DISTILLED WATER IRRIGATION FOR EARS    This solution should only be used if the ears have noted drainage, or debris.    Using a sterile cup, mix 1/2 cup of white vinegar with 1/2 cup distilled water.  Irrigate the ear(s) using 15mL of solution every other day.  Completely dry the ear after irrigation, using a blow dryer on a low heat setting.       **If you are using ear drops, use the drops in the morning and the irrigation solution in the evening.

## 2025-06-30 ENCOUNTER — MYC MEDICAL ADVICE (OUTPATIENT)
Dept: FAMILY MEDICINE | Facility: OTHER | Age: 40
End: 2025-06-30
Payer: COMMERCIAL

## 2025-06-30 DIAGNOSIS — L70.0 ACNE VULGARIS: Primary | ICD-10-CM

## 2025-07-01 RX ORDER — TRETINOIN 0.25 MG/G
CREAM TOPICAL AT BEDTIME
Qty: 45 G | Refills: 3 | Status: SHIPPED | OUTPATIENT
Start: 2025-07-01

## 2025-07-01 NOTE — TELEPHONE ENCOUNTER
Pt requesting an Rx for Tretinoin due to return of Acne since she stopped breastfeeding.     Currently using differin gel and salicylic acid face wash (not working).     Sebastien'd up if willing.     Routing to provider to review and respond.  Bob Oh RN on 7/1/2025 at 9:18 AM

## 2025-07-27 ENCOUNTER — MYC MEDICAL ADVICE (OUTPATIENT)
Dept: FAMILY MEDICINE | Facility: OTHER | Age: 40
End: 2025-07-27
Payer: COMMERCIAL

## 2025-08-16 ENCOUNTER — HEALTH MAINTENANCE LETTER (OUTPATIENT)
Age: 40
End: 2025-08-16

## 2025-08-19 ENCOUNTER — OFFICE VISIT (OUTPATIENT)
Dept: FAMILY MEDICINE | Facility: OTHER | Age: 40
End: 2025-08-19
Attending: FAMILY MEDICINE
Payer: COMMERCIAL

## 2025-08-19 VITALS
HEART RATE: 90 BPM | BODY MASS INDEX: 31.02 KG/M2 | OXYGEN SATURATION: 96 % | SYSTOLIC BLOOD PRESSURE: 128 MMHG | HEIGHT: 66 IN | RESPIRATION RATE: 16 BRPM | TEMPERATURE: 97 F | DIASTOLIC BLOOD PRESSURE: 68 MMHG | WEIGHT: 193 LBS

## 2025-08-19 DIAGNOSIS — F32.81 PMDD (PREMENSTRUAL DYSPHORIC DISORDER): Primary | ICD-10-CM

## 2025-08-19 PROCEDURE — G0463 HOSPITAL OUTPT CLINIC VISIT: HCPCS

## 2025-08-19 RX ORDER — DROSPIRENONE AND ETHINYL ESTRADIOL 0.02-3(28)
1 KIT ORAL DAILY
Qty: 84 TABLET | Refills: 1 | Status: SHIPPED | OUTPATIENT
Start: 2025-08-19

## 2025-08-19 ASSESSMENT — ANXIETY QUESTIONNAIRES
6. BECOMING EASILY ANNOYED OR IRRITABLE: SEVERAL DAYS
1. FEELING NERVOUS, ANXIOUS, OR ON EDGE: SEVERAL DAYS
5. BEING SO RESTLESS THAT IT IS HARD TO SIT STILL: NOT AT ALL
IF YOU CHECKED OFF ANY PROBLEMS ON THIS QUESTIONNAIRE, HOW DIFFICULT HAVE THESE PROBLEMS MADE IT FOR YOU TO DO YOUR WORK, TAKE CARE OF THINGS AT HOME, OR GET ALONG WITH OTHER PEOPLE: NOT DIFFICULT AT ALL
2. NOT BEING ABLE TO STOP OR CONTROL WORRYING: NOT AT ALL
4. TROUBLE RELAXING: SEVERAL DAYS
8. IF YOU CHECKED OFF ANY PROBLEMS, HOW DIFFICULT HAVE THESE MADE IT FOR YOU TO DO YOUR WORK, TAKE CARE OF THINGS AT HOME, OR GET ALONG WITH OTHER PEOPLE?: NOT DIFFICULT AT ALL
7. FEELING AFRAID AS IF SOMETHING AWFUL MIGHT HAPPEN: NOT AT ALL
GAD7 TOTAL SCORE: 4
GAD7 TOTAL SCORE: 4
3. WORRYING TOO MUCH ABOUT DIFFERENT THINGS: SEVERAL DAYS
GAD7 TOTAL SCORE: 4
7. FEELING AFRAID AS IF SOMETHING AWFUL MIGHT HAPPEN: NOT AT ALL

## 2025-08-19 ASSESSMENT — PAIN SCALES - GENERAL: PAINLEVEL_OUTOF10: NO PAIN (0)

## 2025-08-19 ASSESSMENT — PATIENT HEALTH QUESTIONNAIRE - PHQ9
10. IF YOU CHECKED OFF ANY PROBLEMS, HOW DIFFICULT HAVE THESE PROBLEMS MADE IT FOR YOU TO DO YOUR WORK, TAKE CARE OF THINGS AT HOME, OR GET ALONG WITH OTHER PEOPLE: SOMEWHAT DIFFICULT
SUM OF ALL RESPONSES TO PHQ QUESTIONS 1-9: 2
SUM OF ALL RESPONSES TO PHQ QUESTIONS 1-9: 2

## (undated) RX ORDER — KETOROLAC TROMETHAMINE 30 MG/ML
INJECTION, SOLUTION INTRAMUSCULAR; INTRAVENOUS
Status: DISPENSED
Start: 2025-01-09

## (undated) RX ORDER — SODIUM CHLORIDE, SODIUM LACTATE, POTASSIUM CHLORIDE, CALCIUM CHLORIDE 600; 310; 30; 20 MG/100ML; MG/100ML; MG/100ML; MG/100ML
INJECTION, SOLUTION INTRAVENOUS
Status: DISPENSED
Start: 2021-10-11

## (undated) RX ORDER — METOCLOPRAMIDE HYDROCHLORIDE 5 MG/ML
INJECTION INTRAMUSCULAR; INTRAVENOUS
Status: DISPENSED
Start: 2025-01-09

## (undated) RX ORDER — MEDROXYPROGESTERONE ACETATE 150 MG/ML
INJECTION, SUSPENSION INTRAMUSCULAR
Status: DISPENSED
Start: 2021-11-24

## (undated) RX ORDER — DIPHENHYDRAMINE HYDROCHLORIDE 50 MG/ML
INJECTION INTRAMUSCULAR; INTRAVENOUS
Status: DISPENSED
Start: 2025-01-09

## (undated) RX ORDER — OXYTOCIN/0.9 % SODIUM CHLORIDE 30/500 ML
PLASTIC BAG, INJECTION (ML) INTRAVENOUS
Status: DISPENSED
Start: 2021-10-11